# Patient Record
Sex: FEMALE | Race: WHITE | Employment: UNEMPLOYED | URBAN - METROPOLITAN AREA
[De-identification: names, ages, dates, MRNs, and addresses within clinical notes are randomized per-mention and may not be internally consistent; named-entity substitution may affect disease eponyms.]

---

## 2017-08-26 ENCOUNTER — HOSPITAL ENCOUNTER (EMERGENCY)
Age: 67
Discharge: HOME | End: 2017-08-26
Attending: EMERGENCY MEDICINE
Payer: MEDICARE

## 2017-08-26 ENCOUNTER — APPOINTMENT (OUTPATIENT)
Dept: GENERAL RADIOLOGY | Age: 67
End: 2017-08-26
Payer: MEDICARE

## 2017-08-26 VITALS
SYSTOLIC BLOOD PRESSURE: 155 MMHG | DIASTOLIC BLOOD PRESSURE: 82 MMHG | WEIGHT: 173 LBS | RESPIRATION RATE: 16 BRPM | TEMPERATURE: 97.8 F | OXYGEN SATURATION: 97 % | BODY MASS INDEX: 31.83 KG/M2 | HEART RATE: 77 BPM | HEIGHT: 62 IN

## 2017-08-26 DIAGNOSIS — M79.671 RIGHT FOOT PAIN: Primary | ICD-10-CM

## 2017-08-26 PROCEDURE — 73630 X-RAY EXAM OF FOOT: CPT

## 2017-08-26 PROCEDURE — 99283 EMERGENCY DEPT VISIT LOW MDM: CPT

## 2017-08-26 ASSESSMENT — PAIN DESCRIPTION - LOCATION: LOCATION: ANKLE

## 2017-08-26 ASSESSMENT — PAIN SCALES - GENERAL: PAINLEVEL_OUTOF10: 3

## 2017-08-26 ASSESSMENT — PAIN DESCRIPTION - PAIN TYPE: TYPE: ACUTE PAIN

## 2017-08-26 ASSESSMENT — PAIN DESCRIPTION - DESCRIPTORS: DESCRIPTORS: ACHING

## 2019-01-16 ENCOUNTER — TELEPHONE (OUTPATIENT)
Dept: FAMILY MEDICINE CLINIC | Age: 69
End: 2019-01-16

## 2019-01-16 ENCOUNTER — OFFICE VISIT (OUTPATIENT)
Dept: FAMILY MEDICINE CLINIC | Age: 69
End: 2019-01-16
Payer: MEDICARE

## 2019-01-16 VITALS
HEIGHT: 62 IN | HEART RATE: 82 BPM | OXYGEN SATURATION: 95 % | BODY MASS INDEX: 35.51 KG/M2 | DIASTOLIC BLOOD PRESSURE: 87 MMHG | SYSTOLIC BLOOD PRESSURE: 141 MMHG | WEIGHT: 193 LBS | TEMPERATURE: 97.4 F

## 2019-01-16 DIAGNOSIS — Z78.0 POST-MENOPAUSAL: ICD-10-CM

## 2019-01-16 DIAGNOSIS — R03.0 BORDERLINE BLOOD PRESSURE: ICD-10-CM

## 2019-01-16 DIAGNOSIS — H21.562 PUPILLARY ABNORMALITY, LEFT: ICD-10-CM

## 2019-01-16 DIAGNOSIS — E66.9 OBESITY, CLASS II, BMI 35-39.9, NO COMORBIDITY: ICD-10-CM

## 2019-01-16 DIAGNOSIS — E78.00 PURE HYPERCHOLESTEROLEMIA: Primary | ICD-10-CM

## 2019-01-16 DIAGNOSIS — Z23 NEED FOR PROPHYLACTIC VACCINATION AGAINST STREPTOCOCCUS PNEUMONIAE (PNEUMOCOCCUS): ICD-10-CM

## 2019-01-16 DIAGNOSIS — Z12.11 SCREENING FOR COLON CANCER: ICD-10-CM

## 2019-01-16 DIAGNOSIS — Z12.39 BREAST CANCER SCREENING: ICD-10-CM

## 2019-01-16 PROBLEM — E66.812 OBESITY, CLASS II, BMI 35-39.9, NO COMORBIDITY: Status: ACTIVE | Noted: 2019-01-16

## 2019-01-16 PROCEDURE — 99204 OFFICE O/P NEW MOD 45 MIN: CPT | Performed by: NURSE PRACTITIONER

## 2019-01-16 PROCEDURE — 1090F PRES/ABSN URINE INCON ASSESS: CPT | Performed by: NURSE PRACTITIONER

## 2019-01-16 PROCEDURE — G0009 ADMIN PNEUMOCOCCAL VACCINE: HCPCS | Performed by: NURSE PRACTITIONER

## 2019-01-16 PROCEDURE — 1100F PTFALLS ASSESS-DOCD GE2>/YR: CPT | Performed by: NURSE PRACTITIONER

## 2019-01-16 PROCEDURE — 1123F ACP DISCUSS/DSCN MKR DOCD: CPT | Performed by: NURSE PRACTITIONER

## 2019-01-16 PROCEDURE — 90670 PCV13 VACCINE IM: CPT | Performed by: NURSE PRACTITIONER

## 2019-01-16 PROCEDURE — G8427 DOCREV CUR MEDS BY ELIG CLIN: HCPCS | Performed by: NURSE PRACTITIONER

## 2019-01-16 PROCEDURE — 3017F COLORECTAL CA SCREEN DOC REV: CPT | Performed by: NURSE PRACTITIONER

## 2019-01-16 PROCEDURE — 0518F FALL PLAN OF CARE DOCD: CPT | Performed by: NURSE PRACTITIONER

## 2019-01-16 PROCEDURE — 4040F PNEUMOC VAC/ADMIN/RCVD: CPT | Performed by: NURSE PRACTITIONER

## 2019-01-16 PROCEDURE — 3288F FALL RISK ASSESSMENT DOCD: CPT | Performed by: NURSE PRACTITIONER

## 2019-01-16 PROCEDURE — G8417 CALC BMI ABV UP PARAM F/U: HCPCS | Performed by: NURSE PRACTITIONER

## 2019-01-16 PROCEDURE — G8400 PT W/DXA NO RESULTS DOC: HCPCS | Performed by: NURSE PRACTITIONER

## 2019-01-16 PROCEDURE — G8484 FLU IMMUNIZE NO ADMIN: HCPCS | Performed by: NURSE PRACTITIONER

## 2019-01-16 PROCEDURE — G8510 SCR DEP NEG, NO PLAN REQD: HCPCS | Performed by: NURSE PRACTITIONER

## 2019-01-16 PROCEDURE — 1036F TOBACCO NON-USER: CPT | Performed by: NURSE PRACTITIONER

## 2019-01-16 ASSESSMENT — PATIENT HEALTH QUESTIONNAIRE - PHQ9
SUM OF ALL RESPONSES TO PHQ QUESTIONS 1-9: 0
SUM OF ALL RESPONSES TO PHQ QUESTIONS 1-9: 0
2. FEELING DOWN, DEPRESSED OR HOPELESS: 0
1. LITTLE INTEREST OR PLEASURE IN DOING THINGS: 0
SUM OF ALL RESPONSES TO PHQ9 QUESTIONS 1 & 2: 0

## 2019-01-16 ASSESSMENT — ENCOUNTER SYMPTOMS
EYE DISCHARGE: 0
ABDOMINAL PAIN: 0
BLOOD IN STOOL: 0
SHORTNESS OF BREATH: 0
COUGH: 0

## 2019-01-22 ENCOUNTER — HOSPITAL ENCOUNTER (OUTPATIENT)
Dept: WOMENS IMAGING | Age: 69
Discharge: HOME | End: 2019-01-24
Payer: MEDICARE

## 2019-01-22 ENCOUNTER — HOSPITAL ENCOUNTER (OUTPATIENT)
Age: 69
Discharge: HOME | End: 2019-01-22
Payer: MEDICARE

## 2019-01-22 DIAGNOSIS — E78.00 PURE HYPERCHOLESTEROLEMIA: ICD-10-CM

## 2019-01-22 DIAGNOSIS — Z12.39 BREAST CANCER SCREENING: ICD-10-CM

## 2019-01-22 DIAGNOSIS — Z78.0 POST-MENOPAUSAL: ICD-10-CM

## 2019-01-22 DIAGNOSIS — E87.5 HYPERKALEMIA: Primary | ICD-10-CM

## 2019-01-22 LAB
ALBUMIN SERPL-MCNC: 3.9 G/DL (ref 3.5–5.2)
ALBUMIN/GLOBULIN RATIO: ABNORMAL (ref 1–2.5)
ALP BLD-CCNC: 72 U/L (ref 35–104)
ALT SERPL-CCNC: 13 U/L (ref 5–33)
ANION GAP SERPL CALCULATED.3IONS-SCNC: 10 MMOL/L (ref 9–17)
AST SERPL-CCNC: 19 U/L
BILIRUB SERPL-MCNC: 0.66 MG/DL (ref 0.3–1.2)
BUN BLDV-MCNC: 13 MG/DL (ref 8–23)
BUN/CREAT BLD: ABNORMAL (ref 9–20)
CALCIUM SERPL-MCNC: 9.7 MG/DL (ref 8.6–10.4)
CHLORIDE BLD-SCNC: 105 MMOL/L (ref 98–107)
CHOLESTEROL/HDL RATIO: 4.9
CHOLESTEROL: 270 MG/DL
CO2: 27 MMOL/L (ref 20–31)
CREAT SERPL-MCNC: 0.64 MG/DL (ref 0.5–0.9)
GFR AFRICAN AMERICAN: >60 ML/MIN
GFR NON-AFRICAN AMERICAN: >60 ML/MIN
GFR SERPL CREATININE-BSD FRML MDRD: ABNORMAL ML/MIN/{1.73_M2}
GFR SERPL CREATININE-BSD FRML MDRD: ABNORMAL ML/MIN/{1.73_M2}
GLUCOSE BLD-MCNC: 83 MG/DL (ref 70–99)
HCT VFR BLD CALC: 44.6 % (ref 36–46)
HDLC SERPL-MCNC: 55 MG/DL
HEMOGLOBIN: 14.9 G/DL (ref 12–16)
LDL CHOLESTEROL: 196 MG/DL (ref 0–130)
MCH RBC QN AUTO: 30.5 PG (ref 26–34)
MCHC RBC AUTO-ENTMCNC: 33.4 G/DL (ref 31–37)
MCV RBC AUTO: 91.3 FL (ref 80–100)
NRBC AUTOMATED: NORMAL PER 100 WBC
PDW BLD-RTO: 13.5 % (ref 11.5–14.9)
PLATELET # BLD: 314 K/UL (ref 150–450)
PMV BLD AUTO: 8.6 FL (ref 6–12)
POTASSIUM SERPL-SCNC: 5.5 MMOL/L (ref 3.7–5.3)
RBC # BLD: 4.89 M/UL (ref 4–5.2)
SODIUM BLD-SCNC: 142 MMOL/L (ref 135–144)
TOTAL PROTEIN: 7.5 G/DL (ref 6.4–8.3)
TRIGL SERPL-MCNC: 97 MG/DL
VLDLC SERPL CALC-MCNC: ABNORMAL MG/DL (ref 1–30)
WBC # BLD: 3.9 K/UL (ref 3.5–11)

## 2019-01-22 PROCEDURE — 77063 BREAST TOMOSYNTHESIS BI: CPT

## 2019-01-22 PROCEDURE — 77080 DXA BONE DENSITY AXIAL: CPT

## 2019-01-22 PROCEDURE — 80053 COMPREHEN METABOLIC PANEL: CPT

## 2019-01-22 PROCEDURE — 85027 COMPLETE CBC AUTOMATED: CPT

## 2019-01-22 PROCEDURE — 36415 COLL VENOUS BLD VENIPUNCTURE: CPT

## 2019-01-22 PROCEDURE — 80061 LIPID PANEL: CPT

## 2019-01-23 ENCOUNTER — TELEPHONE (OUTPATIENT)
Dept: FAMILY MEDICINE CLINIC | Age: 69
End: 2019-01-23

## 2019-01-23 DIAGNOSIS — M81.0 OSTEOPOROSIS WITHOUT CURRENT PATHOLOGICAL FRACTURE, UNSPECIFIED OSTEOPOROSIS TYPE: ICD-10-CM

## 2019-01-23 RX ORDER — ALENDRONATE SODIUM 70 MG/1
70 TABLET ORAL
Qty: 12 TABLET | Refills: 3 | Status: SHIPPED | OUTPATIENT
Start: 2019-01-23 | End: 2019-09-03

## 2019-01-23 RX ORDER — SODIUM POLYSTYRENE SULFONATE 15 G/60ML
15 SUSPENSION ORAL; RECTAL ONCE
Qty: 1 BOTTLE | Refills: 3 | Status: SHIPPED | OUTPATIENT
Start: 2019-01-23 | End: 2019-01-30 | Stop reason: ALTCHOICE

## 2019-01-23 RX ORDER — ATORVASTATIN CALCIUM 40 MG/1
40 TABLET, FILM COATED ORAL DAILY
Qty: 90 TABLET | Refills: 1 | Status: SHIPPED | OUTPATIENT
Start: 2019-01-23 | End: 2019-09-03

## 2019-01-25 ENCOUNTER — HOSPITAL ENCOUNTER (OUTPATIENT)
Age: 69
Discharge: HOME | End: 2019-01-25
Payer: MEDICARE

## 2019-01-25 DIAGNOSIS — E87.5 HYPERKALEMIA: ICD-10-CM

## 2019-01-25 LAB — POTASSIUM SERPL-SCNC: 4.9 MMOL/L (ref 3.7–5.3)

## 2019-01-25 PROCEDURE — 84132 ASSAY OF SERUM POTASSIUM: CPT

## 2019-01-25 PROCEDURE — 36415 COLL VENOUS BLD VENIPUNCTURE: CPT

## 2019-01-28 ENCOUNTER — TELEPHONE (OUTPATIENT)
Dept: FAMILY MEDICINE CLINIC | Age: 69
End: 2019-01-28

## 2019-01-28 DIAGNOSIS — E87.5 HYPERKALEMIA: ICD-10-CM

## 2019-01-30 ENCOUNTER — OFFICE VISIT (OUTPATIENT)
Dept: FAMILY MEDICINE CLINIC | Age: 69
End: 2019-01-30
Payer: MEDICARE

## 2019-01-30 VITALS
HEART RATE: 79 BPM | WEIGHT: 193.2 LBS | DIASTOLIC BLOOD PRESSURE: 78 MMHG | OXYGEN SATURATION: 92 % | SYSTOLIC BLOOD PRESSURE: 138 MMHG | BODY MASS INDEX: 35.55 KG/M2 | HEIGHT: 62 IN

## 2019-01-30 DIAGNOSIS — M81.0 OSTEOPOROSIS WITHOUT CURRENT PATHOLOGICAL FRACTURE, UNSPECIFIED OSTEOPOROSIS TYPE: ICD-10-CM

## 2019-01-30 DIAGNOSIS — E66.9 OBESITY, CLASS II, BMI 35-39.9, NO COMORBIDITY: ICD-10-CM

## 2019-01-30 DIAGNOSIS — Z23 NEED FOR DIPHTHERIA-TETANUS-PERTUSSIS (TDAP) VACCINE: ICD-10-CM

## 2019-01-30 DIAGNOSIS — E87.5 HYPERKALEMIA: Primary | ICD-10-CM

## 2019-01-30 DIAGNOSIS — E78.00 PURE HYPERCHOLESTEROLEMIA: ICD-10-CM

## 2019-01-30 PROCEDURE — G8482 FLU IMMUNIZE ORDER/ADMIN: HCPCS | Performed by: NURSE PRACTITIONER

## 2019-01-30 PROCEDURE — 1036F TOBACCO NON-USER: CPT | Performed by: NURSE PRACTITIONER

## 2019-01-30 PROCEDURE — G8417 CALC BMI ABV UP PARAM F/U: HCPCS | Performed by: NURSE PRACTITIONER

## 2019-01-30 PROCEDURE — G8399 PT W/DXA RESULTS DOCUMENT: HCPCS | Performed by: NURSE PRACTITIONER

## 2019-01-30 PROCEDURE — 4040F PNEUMOC VAC/ADMIN/RCVD: CPT | Performed by: NURSE PRACTITIONER

## 2019-01-30 PROCEDURE — G8427 DOCREV CUR MEDS BY ELIG CLIN: HCPCS | Performed by: NURSE PRACTITIONER

## 2019-01-30 PROCEDURE — 1123F ACP DISCUSS/DSCN MKR DOCD: CPT | Performed by: NURSE PRACTITIONER

## 2019-01-30 PROCEDURE — 1101F PT FALLS ASSESS-DOCD LE1/YR: CPT | Performed by: NURSE PRACTITIONER

## 2019-01-30 PROCEDURE — 99214 OFFICE O/P EST MOD 30 MIN: CPT | Performed by: NURSE PRACTITIONER

## 2019-01-30 PROCEDURE — 3017F COLORECTAL CA SCREEN DOC REV: CPT | Performed by: NURSE PRACTITIONER

## 2019-01-30 PROCEDURE — 1090F PRES/ABSN URINE INCON ASSESS: CPT | Performed by: NURSE PRACTITIONER

## 2019-01-30 ASSESSMENT — ENCOUNTER SYMPTOMS
SHORTNESS OF BREATH: 0
BLOOD IN STOOL: 0
ABDOMINAL PAIN: 0
COUGH: 0
EYE DISCHARGE: 0

## 2019-09-03 ENCOUNTER — OFFICE VISIT (OUTPATIENT)
Dept: FAMILY MEDICINE CLINIC | Age: 69
End: 2019-09-03
Payer: MEDICARE

## 2019-09-03 VITALS
WEIGHT: 187.2 LBS | SYSTOLIC BLOOD PRESSURE: 110 MMHG | DIASTOLIC BLOOD PRESSURE: 64 MMHG | OXYGEN SATURATION: 93 % | HEART RATE: 121 BPM | BODY MASS INDEX: 34.45 KG/M2 | TEMPERATURE: 97.3 F | HEIGHT: 62 IN

## 2019-09-03 DIAGNOSIS — M81.0 AGE-RELATED OSTEOPOROSIS WITHOUT CURRENT PATHOLOGICAL FRACTURE: ICD-10-CM
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
## 2019-09-03 DIAGNOSIS — J40 BRONCHITIS: Primary | ICD-10-CM

## 2019-09-03 DIAGNOSIS — E78.2 MIXED HYPERLIPIDEMIA: ICD-10-CM

## 2019-09-03 DIAGNOSIS — Z91.81 AT HIGH RISK FOR FALLS: ICD-10-CM

## 2019-09-03 DIAGNOSIS — Z00.00 PREVENTATIVE HEALTH CARE: ICD-10-CM

## 2019-09-03 PROBLEM — E87.5 HYPERKALEMIA: Status: RESOLVED | Noted: 2019-01-28 | Resolved: 2019-09-03

## 2019-09-03 PROCEDURE — 1036F TOBACCO NON-USER: CPT | Performed by: FAMILY MEDICINE

## 2019-09-03 PROCEDURE — 99214 OFFICE O/P EST MOD 30 MIN: CPT | Performed by: FAMILY MEDICINE

## 2019-09-03 PROCEDURE — G8399 PT W/DXA RESULTS DOCUMENT: HCPCS | Performed by: FAMILY MEDICINE

## 2019-09-03 PROCEDURE — 1123F ACP DISCUSS/DSCN MKR DOCD: CPT | Performed by: FAMILY MEDICINE

## 2019-09-03 PROCEDURE — 4040F PNEUMOC VAC/ADMIN/RCVD: CPT | Performed by: FAMILY MEDICINE

## 2019-09-03 PROCEDURE — G8427 DOCREV CUR MEDS BY ELIG CLIN: HCPCS | Performed by: FAMILY MEDICINE

## 2019-09-03 PROCEDURE — 3017F COLORECTAL CA SCREEN DOC REV: CPT | Performed by: FAMILY MEDICINE

## 2019-09-03 PROCEDURE — G8417 CALC BMI ABV UP PARAM F/U: HCPCS | Performed by: FAMILY MEDICINE

## 2019-09-03 PROCEDURE — 1090F PRES/ABSN URINE INCON ASSESS: CPT | Performed by: FAMILY MEDICINE

## 2019-09-03 RX ORDER — AZITHROMYCIN 250 MG/1
TABLET, FILM COATED ORAL
Qty: 6 TABLET | Refills: 0 | Status: SHIPPED | OUTPATIENT
Start: 2019-09-03 | End: 2019-09-08

## 2019-09-03 RX ORDER — ASPIRIN 81 MG/1
81 TABLET ORAL DAILY
Qty: 90 TABLET | Refills: 1 | Status: SHIPPED | OUTPATIENT
Start: 2019-09-03 | End: 2020-03-13

## 2019-09-03 RX ORDER — ATORVASTATIN CALCIUM 20 MG/1
20 TABLET, FILM COATED ORAL DAILY
Qty: 30 TABLET | Refills: 3 | Status: SHIPPED | OUTPATIENT
Start: 2019-09-03 | End: 2020-01-24 | Stop reason: SDUPTHER

## 2019-09-03 RX ORDER — GUAIFENESIN 600 MG/1
600 TABLET, EXTENDED RELEASE ORAL 2 TIMES DAILY
Qty: 30 TABLET | Refills: 0 | Status: SHIPPED | OUTPATIENT
Start: 2019-09-03 | End: 2019-12-03 | Stop reason: ALTCHOICE

## 2019-09-03 ASSESSMENT — ENCOUNTER SYMPTOMS
RHINORRHEA: 1
COUGH: 1
SINUS PAIN: 1
VOMITING: 0
BACK PAIN: 0
WHEEZING: 0
PHOTOPHOBIA: 0
SINUS PRESSURE: 1
CONSTIPATION: 0
SHORTNESS OF BREATH: 1
ABDOMINAL PAIN: 0
ABDOMINAL DISTENTION: 0
COLOR CHANGE: 0
CHEST TIGHTNESS: 1
SORE THROAT: 0
NAUSEA: 0

## 2019-09-03 NOTE — PROGRESS NOTES
Visit Information    Have you changed or started any medications since your last visit including any over-the-counter medicines, vitamins, or herbal medicines? no   Are you having any side effects from any of your medications? -  no  Have you stopped taking any of your medications? Is so, why? -  no    Have you seen any other physician or provider since your last visit? No  Have you had any other diagnostic tests since your last visit? No  Have you been seen in the emergency room and/or had an admission to a hospital since we last saw you? No  Have you had your routine dental cleaning in the past 6 months? no    Have you activated your [employer] account? If not, what are your barriers?  Yes     Patient Care Team:  Alyson Laguna MD as PCP - General (Family Medicine)  Alyson Laguna MD as PCP - [facility]    Medical History Review  Past Medical, Family, and Social History reviewed and does contribute to the patient presenting condition    Health Maintenance   Topic Date Due    Hepatitis C screen  1950    Shingles Vaccine (1 of 2) 10/19/2000    Colon cancer screen colonoscopy  10/19/2000    Annual Wellness Visit (AWV)  10/19/2013    Flu vaccine (1) 09/01/2019    Pneumococcal 65+ years Vaccine (2 of 2 - PPSV23) 01/16/2020    Breast cancer screen  01/22/2021    Lipid screen  01/22/2024    DTaP/Tdap/Td vaccine (2 - Td) 01/30/2029    DEXA (modify frequency per FRAX score)  Completed

## 2019-09-10 DIAGNOSIS — Z12.11 SCREENING FOR COLON CANCER: ICD-10-CM

## 2019-09-10 LAB
CONTROL: PRESENT
HEMOCCULT STL QL: NEGATIVE

## 2019-09-10 PROCEDURE — 82274 ASSAY TEST FOR BLOOD FECAL: CPT | Performed by: FAMILY MEDICINE

## 2019-09-24 ENCOUNTER — OFFICE VISIT (OUTPATIENT)
Dept: FAMILY MEDICINE CLINIC | Age: 69
End: 2019-09-24
Payer: MEDICARE

## 2019-09-24 VITALS
SYSTOLIC BLOOD PRESSURE: 133 MMHG | DIASTOLIC BLOOD PRESSURE: 79 MMHG | OXYGEN SATURATION: 98 % | WEIGHT: 188 LBS | BODY MASS INDEX: 34.6 KG/M2 | TEMPERATURE: 96.9 F | HEIGHT: 62 IN | HEART RATE: 74 BPM

## 2019-09-24 DIAGNOSIS — Z00.00 ROUTINE GENERAL MEDICAL EXAMINATION AT A HEALTH CARE FACILITY: ICD-10-CM

## 2019-09-24 DIAGNOSIS — Z00.00 MEDICARE ANNUAL WELLNESS VISIT, INITIAL: Primary | ICD-10-CM

## 2019-09-24 DIAGNOSIS — Z23 NEED FOR VACCINATION: ICD-10-CM

## 2019-09-24 DIAGNOSIS — E78.2 MIXED HYPERLIPIDEMIA: ICD-10-CM

## 2019-09-24 PROCEDURE — G0438 PPPS, INITIAL VISIT: HCPCS | Performed by: FAMILY MEDICINE

## 2019-09-24 PROCEDURE — 4040F PNEUMOC VAC/ADMIN/RCVD: CPT | Performed by: FAMILY MEDICINE

## 2019-09-24 PROCEDURE — 1123F ACP DISCUSS/DSCN MKR DOCD: CPT | Performed by: FAMILY MEDICINE

## 2019-09-24 PROCEDURE — 3017F COLORECTAL CA SCREEN DOC REV: CPT | Performed by: FAMILY MEDICINE

## 2019-09-24 ASSESSMENT — LIFESTYLE VARIABLES: HOW OFTEN DO YOU HAVE A DRINK CONTAINING ALCOHOL: 0

## 2019-09-24 ASSESSMENT — PATIENT HEALTH QUESTIONNAIRE - PHQ9
SUM OF ALL RESPONSES TO PHQ QUESTIONS 1-9: 0
SUM OF ALL RESPONSES TO PHQ QUESTIONS 1-9: 0

## 2019-12-03 ENCOUNTER — OFFICE VISIT (OUTPATIENT)
Dept: FAMILY MEDICINE CLINIC | Age: 69
End: 2019-12-03
Payer: MEDICARE

## 2019-12-03 VITALS
HEART RATE: 72 BPM | HEIGHT: 62 IN | BODY MASS INDEX: 35.96 KG/M2 | OXYGEN SATURATION: 97 % | WEIGHT: 195.4 LBS | SYSTOLIC BLOOD PRESSURE: 135 MMHG | DIASTOLIC BLOOD PRESSURE: 83 MMHG

## 2019-12-03 DIAGNOSIS — Z23 NEED FOR VACCINATION: ICD-10-CM

## 2019-12-03 DIAGNOSIS — E78.2 MIXED HYPERLIPIDEMIA: Primary | ICD-10-CM

## 2019-12-03 DIAGNOSIS — M81.0 AGE-RELATED OSTEOPOROSIS WITHOUT CURRENT PATHOLOGICAL FRACTURE: ICD-10-CM

## 2019-12-03 DIAGNOSIS — E66.9 OBESITY, CLASS II, BMI 35-39.9, NO COMORBIDITY: ICD-10-CM

## 2019-12-03 DIAGNOSIS — Z23 NEED FOR PROPHYLACTIC VACCINATION AND INOCULATION AGAINST VARICELLA: ICD-10-CM

## 2019-12-03 PROCEDURE — 1036F TOBACCO NON-USER: CPT | Performed by: FAMILY MEDICINE

## 2019-12-03 PROCEDURE — G8427 DOCREV CUR MEDS BY ELIG CLIN: HCPCS | Performed by: FAMILY MEDICINE

## 2019-12-03 PROCEDURE — G0008 ADMIN INFLUENZA VIRUS VAC: HCPCS | Performed by: FAMILY MEDICINE

## 2019-12-03 PROCEDURE — 1090F PRES/ABSN URINE INCON ASSESS: CPT | Performed by: FAMILY MEDICINE

## 2019-12-03 PROCEDURE — G8482 FLU IMMUNIZE ORDER/ADMIN: HCPCS | Performed by: FAMILY MEDICINE

## 2019-12-03 PROCEDURE — 99213 OFFICE O/P EST LOW 20 MIN: CPT | Performed by: FAMILY MEDICINE

## 2019-12-03 PROCEDURE — 90653 IIV ADJUVANT VACCINE IM: CPT | Performed by: FAMILY MEDICINE

## 2019-12-03 PROCEDURE — 1123F ACP DISCUSS/DSCN MKR DOCD: CPT | Performed by: FAMILY MEDICINE

## 2019-12-03 PROCEDURE — G8399 PT W/DXA RESULTS DOCUMENT: HCPCS | Performed by: FAMILY MEDICINE

## 2019-12-03 PROCEDURE — 4040F PNEUMOC VAC/ADMIN/RCVD: CPT | Performed by: FAMILY MEDICINE

## 2019-12-03 PROCEDURE — 3017F COLORECTAL CA SCREEN DOC REV: CPT | Performed by: FAMILY MEDICINE

## 2019-12-03 PROCEDURE — G8417 CALC BMI ABV UP PARAM F/U: HCPCS | Performed by: FAMILY MEDICINE

## 2019-12-03 ASSESSMENT — PATIENT HEALTH QUESTIONNAIRE - PHQ9
2. FEELING DOWN, DEPRESSED OR HOPELESS: 0
SUM OF ALL RESPONSES TO PHQ9 QUESTIONS 1 & 2: 0
SUM OF ALL RESPONSES TO PHQ QUESTIONS 1-9: 0
1. LITTLE INTEREST OR PLEASURE IN DOING THINGS: 0
SUM OF ALL RESPONSES TO PHQ QUESTIONS 1-9: 0

## 2019-12-03 ASSESSMENT — ENCOUNTER SYMPTOMS
CONSTIPATION: 0
ABDOMINAL PAIN: 0
COUGH: 0
ABDOMINAL DISTENTION: 0
COLOR CHANGE: 0
DIARRHEA: 0
SINUS PRESSURE: 0
PHOTOPHOBIA: 0
SHORTNESS OF BREATH: 0
WHEEZING: 0

## 2020-01-24 RX ORDER — ATORVASTATIN CALCIUM 20 MG/1
20 TABLET, FILM COATED ORAL DAILY
Qty: 30 TABLET | Refills: 3 | Status: SHIPPED | OUTPATIENT
Start: 2020-01-24 | End: 2020-05-12

## 2020-02-08 ENCOUNTER — APPOINTMENT (OUTPATIENT)
Dept: GENERAL RADIOLOGY | Age: 70
End: 2020-02-08
Payer: MEDICARE

## 2020-02-08 ENCOUNTER — HOSPITAL ENCOUNTER (EMERGENCY)
Age: 70
Discharge: HOME | End: 2020-02-08
Attending: EMERGENCY MEDICINE
Payer: MEDICARE

## 2020-02-08 VITALS
SYSTOLIC BLOOD PRESSURE: 186 MMHG | HEIGHT: 62 IN | RESPIRATION RATE: 18 BRPM | OXYGEN SATURATION: 98 % | DIASTOLIC BLOOD PRESSURE: 86 MMHG | TEMPERATURE: 97.7 F | HEART RATE: 124 BPM | BODY MASS INDEX: 34.96 KG/M2 | WEIGHT: 190 LBS

## 2020-02-08 PROCEDURE — 2500000003 HC RX 250 WO HCPCS: Performed by: STUDENT IN AN ORGANIZED HEALTH CARE EDUCATION/TRAINING PROGRAM

## 2020-02-08 PROCEDURE — 99283 EMERGENCY DEPT VISIT LOW MDM: CPT

## 2020-02-08 PROCEDURE — 6370000000 HC RX 637 (ALT 250 FOR IP): Performed by: STUDENT IN AN ORGANIZED HEALTH CARE EDUCATION/TRAINING PROGRAM

## 2020-02-08 PROCEDURE — 6360000002 HC RX W HCPCS: Performed by: STUDENT IN AN ORGANIZED HEALTH CARE EDUCATION/TRAINING PROGRAM

## 2020-02-08 PROCEDURE — 90471 IMMUNIZATION ADMIN: CPT | Performed by: STUDENT IN AN ORGANIZED HEALTH CARE EDUCATION/TRAINING PROGRAM

## 2020-02-08 PROCEDURE — 90715 TDAP VACCINE 7 YRS/> IM: CPT | Performed by: STUDENT IN AN ORGANIZED HEALTH CARE EDUCATION/TRAINING PROGRAM

## 2020-02-08 PROCEDURE — 73130 X-RAY EXAM OF HAND: CPT

## 2020-02-08 RX ORDER — IBUPROFEN 800 MG/1
800 TABLET ORAL ONCE
Status: COMPLETED | OUTPATIENT
Start: 2020-02-08 | End: 2020-02-08

## 2020-02-08 RX ORDER — CEPHALEXIN 500 MG/1
500 CAPSULE ORAL 2 TIMES DAILY
Qty: 14 CAPSULE | Refills: 0 | Status: SHIPPED | OUTPATIENT
Start: 2020-02-08 | End: 2020-02-15

## 2020-02-08 RX ORDER — CEPHALEXIN 250 MG/1
500 CAPSULE ORAL ONCE
Status: COMPLETED | OUTPATIENT
Start: 2020-02-08 | End: 2020-02-08

## 2020-02-08 RX ORDER — OXYCODONE HYDROCHLORIDE 5 MG/1
5 TABLET ORAL EVERY 6 HOURS PRN
Qty: 8 TABLET | Refills: 0 | Status: SHIPPED | OUTPATIENT
Start: 2020-02-08 | End: 2020-02-11

## 2020-02-08 RX ORDER — BUPIVACAINE HYDROCHLORIDE 5 MG/ML
30 INJECTION, SOLUTION EPIDURAL; INTRACAUDAL ONCE
Status: COMPLETED | OUTPATIENT
Start: 2020-02-08 | End: 2020-02-08

## 2020-02-08 RX ORDER — ACETAMINOPHEN 500 MG
1000 TABLET ORAL EVERY 6 HOURS PRN
Qty: 30 TABLET | Refills: 0 | Status: SHIPPED | OUTPATIENT
Start: 2020-02-08 | End: 2022-02-15 | Stop reason: ALTCHOICE

## 2020-02-08 RX ORDER — IBUPROFEN 800 MG/1
800 TABLET ORAL ONCE
Status: DISCONTINUED | OUTPATIENT
Start: 2020-02-08 | End: 2020-02-08

## 2020-02-08 RX ORDER — IBUPROFEN 600 MG/1
600 TABLET ORAL EVERY 6 HOURS PRN
Qty: 30 TABLET | Refills: 0 | Status: SHIPPED | OUTPATIENT
Start: 2020-02-08 | End: 2022-02-15 | Stop reason: SDUPTHER

## 2020-02-08 RX ADMIN — IBUPROFEN 800 MG: 800 TABLET, FILM COATED ORAL at 17:01

## 2020-02-08 RX ADMIN — CEPHALEXIN 500 MG: 250 CAPSULE ORAL at 16:47

## 2020-02-08 RX ADMIN — BUPIVACAINE HYDROCHLORIDE 150 MG: 5 INJECTION, SOLUTION EPIDURAL; INTRACAUDAL; PERINEURAL at 17:05

## 2020-02-08 RX ADMIN — TETANUS TOXOID, REDUCED DIPHTHERIA TOXOID AND ACELLULAR PERTUSSIS VACCINE, ADSORBED 0.5 ML: 5; 2.5; 8; 8; 2.5 SUSPENSION INTRAMUSCULAR at 16:47

## 2020-02-08 ASSESSMENT — ENCOUNTER SYMPTOMS
EYE REDNESS: 0
EYE DISCHARGE: 0
ABDOMINAL PAIN: 0
COLOR CHANGE: 0
SHORTNESS OF BREATH: 0

## 2020-02-08 ASSESSMENT — PAIN SCALES - GENERAL
PAINLEVEL_OUTOF10: 5
PAINLEVEL_OUTOF10: 5
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
## 2020-02-10 ENCOUNTER — TELEPHONE (OUTPATIENT)
Dept: FAMILY MEDICINE CLINIC | Age: 70
End: 2020-02-10

## 2020-02-10 NOTE — TELEPHONE ENCOUNTER
[facility] ED Follow up Call    Reason for ED visit: Hand/finger amputation         FU appts/Provider:    Future Appointments   Date Time Provider Aleida Desir   6/3/2020  7:15 AM Meghan Sainz MD [facility] [address] IF NOT USED  Hi, this message is for Yarelis Hurst This is [place] from [place] office. Just calling to see how you are doing after your recent visit to the Emergency Room. [facility] wants to make sure you were able to fill any prescriptions and that you understand your discharge instructions. Please return our call if you need to make a follow up appointment with your provider or have any further needs. Our phone number is [phone]. Have a great day.

## 2020-03-13 RX ORDER — ACETAMINOPHEN/DIPHENHYDRAMINE 500MG-25MG
TABLET ORAL
Qty: 90 TABLET | Refills: 1 | Status: SHIPPED | OUTPATIENT
Start: 2020-03-13 | End: 2020-11-27 | Stop reason: SDUPTHER

## 2020-05-12 RX ORDER — ATORVASTATIN CALCIUM 20 MG/1
TABLET, FILM COATED ORAL
Qty: 30 TABLET | Refills: 3 | Status: SHIPPED | OUTPATIENT
Start: 2020-05-12 | End: 2020-06-03

## 2020-05-12 NOTE — TELEPHONE ENCOUNTER
Please Approve or Refuse.   Send to Pharmacy per Pt's Request:     RX: [facility]     Next Visit Date:  7/1/2020   Last Visit Date: 12/3/2019    No results found for: LABA1C          ( goal A1C is < 7)   BP Readings from Last 3 Encounters:   02/08/20 (!) 186/86   12/03/19 135/83   09/24/19 133/79          (goal 120/80)  BUN   Date Value Ref Range Status   01/22/2019 13 8 - 23 mg/dL Final     CREATININE   Date Value Ref Range Status   01/22/2019 0.64 0.50 - 0.90 mg/dL Final     Potassium   Date Value Ref Range Status   01/25/2019 4.9 3.7 - 5.3 mmol/L Final

## 2020-06-03 RX ORDER — ATORVASTATIN CALCIUM 20 MG/1
TABLET, FILM COATED ORAL
Qty: 30 TABLET | Refills: 3 | Status: SHIPPED | OUTPATIENT
Start: 2020-06-03 | End: 2020-11-27 | Stop reason: SDUPTHER

## 2020-07-20 ENCOUNTER — HOSPITAL ENCOUNTER (OUTPATIENT)
Age: 70
Setting detail: SPECIMEN
Discharge: HOME | End: 2020-07-20
Payer: MEDICARE

## 2020-07-20 ENCOUNTER — OFFICE VISIT (OUTPATIENT)
Dept: FAMILY MEDICINE CLINIC | Age: 70
End: 2020-07-20
Payer: MEDICARE

## 2020-07-20 VITALS
OXYGEN SATURATION: 98 % | HEIGHT: 62 IN | HEART RATE: 74 BPM | SYSTOLIC BLOOD PRESSURE: 128 MMHG | BODY MASS INDEX: 37.32 KG/M2 | TEMPERATURE: 97.7 F | DIASTOLIC BLOOD PRESSURE: 84 MMHG | WEIGHT: 202.8 LBS

## 2020-07-20 LAB
ALBUMIN SERPL-MCNC: 4.1 G/DL (ref 3.5–5.2)
ALBUMIN/GLOBULIN RATIO: NORMAL (ref 1–2.5)
ALP BLD-CCNC: 74 U/L (ref 35–104)
ALT SERPL-CCNC: 15 U/L (ref 5–33)
ANION GAP SERPL CALCULATED.3IONS-SCNC: 11 MMOL/L (ref 9–17)
AST SERPL-CCNC: 18 U/L
BILIRUB SERPL-MCNC: 0.34 MG/DL (ref 0.3–1.2)
BUN BLDV-MCNC: 22 MG/DL (ref 8–23)
BUN/CREAT BLD: NORMAL (ref 9–20)
CALCIUM SERPL-MCNC: 9.6 MG/DL (ref 8.6–10.4)
CHLORIDE BLD-SCNC: 104 MMOL/L (ref 98–107)
CHOLESTEROL/HDL RATIO: 3
CHOLESTEROL: 194 MG/DL
CO2: 26 MMOL/L (ref 20–31)
CREAT SERPL-MCNC: 0.67 MG/DL (ref 0.5–0.9)
GFR AFRICAN AMERICAN: >60 ML/MIN
GFR NON-AFRICAN AMERICAN: >60 ML/MIN
GFR SERPL CREATININE-BSD FRML MDRD: NORMAL ML/MIN/{1.73_M2}
GFR SERPL CREATININE-BSD FRML MDRD: NORMAL ML/MIN/{1.73_M2}
GLUCOSE BLD-MCNC: 99 MG/DL (ref 70–99)
HCT VFR BLD CALC: 45.7 % (ref 36–46)
HDLC SERPL-MCNC: 64 MG/DL
HEMOGLOBIN: 15.2 G/DL (ref 12–16)
HEPATITIS C ANTIBODY: NONREACTIVE
LDL CHOLESTEROL: 115 MG/DL (ref 0–130)
MCH RBC QN AUTO: 30.9 PG (ref 26–34)
MCHC RBC AUTO-ENTMCNC: 33.3 G/DL (ref 31–37)
MCV RBC AUTO: 92.8 FL (ref 80–100)
NRBC AUTOMATED: NORMAL PER 100 WBC
PDW BLD-RTO: 13.1 % (ref 11.5–14.9)
PLATELET # BLD: 279 K/UL (ref 150–450)
PMV BLD AUTO: 8.6 FL (ref 6–12)
POTASSIUM SERPL-SCNC: 4.6 MMOL/L (ref 3.7–5.3)
RBC # BLD: 4.93 M/UL (ref 4–5.2)
SODIUM BLD-SCNC: 141 MMOL/L (ref 135–144)
TOTAL PROTEIN: 7.7 G/DL (ref 6.4–8.3)
TRIGL SERPL-MCNC: 75 MG/DL
TSH SERPL DL<=0.05 MIU/L-ACNC: 2.42 MIU/L (ref 0.3–5)
VLDLC SERPL CALC-MCNC: NORMAL MG/DL (ref 1–30)
WBC # BLD: 4.6 K/UL (ref 3.5–11)

## 2020-07-20 PROCEDURE — 85027 COMPLETE CBC AUTOMATED: CPT

## 2020-07-20 PROCEDURE — 84443 ASSAY THYROID STIM HORMONE: CPT

## 2020-07-20 PROCEDURE — 86803 HEPATITIS C AB TEST: CPT

## 2020-07-20 PROCEDURE — 36415 COLL VENOUS BLD VENIPUNCTURE: CPT

## 2020-07-20 PROCEDURE — 1090F PRES/ABSN URINE INCON ASSESS: CPT | Performed by: FAMILY MEDICINE

## 2020-07-20 PROCEDURE — 1123F ACP DISCUSS/DSCN MKR DOCD: CPT | Performed by: FAMILY MEDICINE

## 2020-07-20 PROCEDURE — G8427 DOCREV CUR MEDS BY ELIG CLIN: HCPCS | Performed by: FAMILY MEDICINE

## 2020-07-20 PROCEDURE — 99213 OFFICE O/P EST LOW 20 MIN: CPT | Performed by: FAMILY MEDICINE

## 2020-07-20 PROCEDURE — 3017F COLORECTAL CA SCREEN DOC REV: CPT | Performed by: FAMILY MEDICINE

## 2020-07-20 PROCEDURE — 80061 LIPID PANEL: CPT

## 2020-07-20 PROCEDURE — G8417 CALC BMI ABV UP PARAM F/U: HCPCS | Performed by: FAMILY MEDICINE

## 2020-07-20 PROCEDURE — 80053 COMPREHEN METABOLIC PANEL: CPT

## 2020-07-20 PROCEDURE — G8399 PT W/DXA RESULTS DOCUMENT: HCPCS | Performed by: FAMILY MEDICINE

## 2020-07-20 PROCEDURE — 4040F PNEUMOC VAC/ADMIN/RCVD: CPT | Performed by: FAMILY MEDICINE

## 2020-07-20 PROCEDURE — 1036F TOBACCO NON-USER: CPT | Performed by: FAMILY MEDICINE

## 2020-07-20 RX ORDER — SODIUM CHLORIDE 9 MG/ML
INJECTION, SOLUTION INTRAVENOUS CONTINUOUS
Status: CANCELLED | OUTPATIENT
Start: 2020-07-20

## 2020-07-20 RX ORDER — DIPHENHYDRAMINE HYDROCHLORIDE 50 MG/ML
50 INJECTION INTRAMUSCULAR; INTRAVENOUS ONCE
Status: CANCELLED | OUTPATIENT
Start: 2020-07-20

## 2020-07-20 RX ORDER — EPINEPHRINE 1 MG/ML
0.3 INJECTION, SOLUTION, CONCENTRATE INTRAVENOUS PRN
Status: CANCELLED | OUTPATIENT
Start: 2020-07-20

## 2020-07-20 RX ORDER — METHYLPREDNISOLONE SODIUM SUCCINATE 125 MG/2ML
125 INJECTION, POWDER, LYOPHILIZED, FOR SOLUTION INTRAMUSCULAR; INTRAVENOUS ONCE
Status: CANCELLED | OUTPATIENT
Start: 2020-07-20

## 2020-07-20 ASSESSMENT — ENCOUNTER SYMPTOMS
COUGH: 0
PHOTOPHOBIA: 0
WHEEZING: 0
DIARRHEA: 0
BLOOD IN STOOL: 0
VOMITING: 0
ABDOMINAL DISTENTION: 0
CONSTIPATION: 0
BACK PAIN: 0
COLOR CHANGE: 0
SHORTNESS OF BREATH: 0
NAUSEA: 0
ABDOMINAL PAIN: 0

## 2020-07-20 ASSESSMENT — PATIENT HEALTH QUESTIONNAIRE - PHQ9
1. LITTLE INTEREST OR PLEASURE IN DOING THINGS: 0
2. FEELING DOWN, DEPRESSED OR HOPELESS: 0
SUM OF ALL RESPONSES TO PHQ9 QUESTIONS 1 & 2: 0
SUM OF ALL RESPONSES TO PHQ QUESTIONS 1-9: 0
SUM OF ALL RESPONSES TO PHQ QUESTIONS 1-9: 0

## 2020-07-20 NOTE — PROGRESS NOTES
Visit Information    Have you changed or started any medications since your last visit including any over-the-counter medicines, vitamins, or herbal medicines? no   Are you having any side effects from any of your medications? -  no  Have you stopped taking any of your medications? Is so, why? -  no    Have you seen any other physician or provider since your last visit? Yes - Records Obtained  Have you had any other diagnostic tests since your last visit? No  Have you been seen in the emergency room and/or had an admission to a hospital since we last saw you? No  Have you had your routine dental cleaning in the past 6 months? no    Have you activated your happyview account? If not, what are your barriers?  Yes     Patient Care Team:  Hair Moody MD as PCP - General (Family Medicine)  Hair Moody MD as PCP - [facility] Provider    Medical History Review  Past Medical, Family, and Social History reviewed and does contribute to the patient presenting condition    Health Maintenance   Topic Date Due    Hepatitis C screen  1950    Shingles Vaccine (1 of 2) 10/19/2000    Lipid screen  01/22/2020    Flu vaccine (1) 09/01/2020    Colon Cancer Screen FIT/FOBT  09/10/2020    Annual Wellness Visit (AWV)  09/24/2020    Breast cancer screen  01/22/2021    DTaP/Tdap/Td vaccine (3 - Td) 02/08/2030    DEXA (modify frequency per FRAX score)  Completed    Pneumococcal 65+ years Vaccine  Completed    Hepatitis A vaccine  Aged Out    Hepatitis B vaccine  Aged Out    Hib vaccine  Aged Out    Meningococcal (ACWY) vaccine  Aged Out

## 2020-07-20 NOTE — PATIENT INSTRUCTIONS
Patient Education        Learning About Low-Carbohydrate Diets  What is a low-carbohydrate diet? A low-carbohydrate (or \"low-carb\") diet limits foods and drinks that have carbohydrates. This includes grains, fruits, milk and yogurt, and starchy vegetables like potatoes, beans, and corn. It also avoids foods and drinks that have added sugar. Instead, low-carb diets include foods that are high in protein and fat. Why might you follow a low-carb diet? Low-carb diets may be used for a variety of reasons, such as for weight loss. People who have diabetes may use a low-carb diet to help manage their blood sugar levels. What should you do before you start the diet? Talk to your doctor before you try any diet. This is even more important if you have health problems like kidney disease, heart disease, or diabetes. Your doctor may suggest that you meet with a registered dietitian. A dietitian can help you make an eating plan that works for you. What foods do you eat on a low-carb diet? On a low-carb diet, you choose foods that are high in protein and fat. Examples of these are:  · Meat, poultry, and fish. · Eggs. · Nuts, such as walnuts, pecans, almonds, and peanuts. · Peanut butter and other nut butters. · Tofu. · Avocado. · Gar Hero. · Non-starchy vegetables like broccoli, cauliflower, green beans, mushrooms, peppers, lettuce, and spinach. · Unsweetened non-dairy milks like almond milk and coconut milk. · Cheese, cottage cheese, and cream cheese. Current as of: August 22, 2019               Content Version: 12.5  © 3265-4083 Healthwise, [employer]. Care instructions adapted under license by [address] If you have questions about a medical condition or this instruction, always ask your healthcare professional. [address] any warranty or liability for your use of this information. Patient Education        Learning About Low-Fat Eating  What is low-fat eating?     Most food has some fat in it. Your body needs some fat to be healthy. But some kinds of fats are healthier than others. In a low-fat eating plan, you try to choose healthier fats and eat fewer unhealthy fats. Healthy fats include olive and canola oil. Try to avoid eating too much saturated fat (such as in cheese and meats) and trans fat (a type of fat found in many packaged snack foods and other baked goods). You do not need to cut all fat from your diet. But you can make healthier choices about the types and amount of fat you eat. Even though it is a good idea to choose healthier fats, it is still important to be careful of how much fat you eat, because all fats are high in calories. What are the different types of fats? Unhealthy fat  · Saturated fat. Saturated fats are mostly in animal foods, such as meat and dairy foods. Tropical oils, such as coconut oil, palm oil, and cocoa butter, are also saturated fats. Healthy fats  · Monounsaturated fat. Monounsaturated fats are liquid at room temperature but get solid when refrigerated. Eating foods that are high in this fat may help lower your \"bad\" (LDL) cholesterol, keep your \"good\" (HDL) cholesterol level up, and lower your chances of getting coronary artery disease. This fat is found in canola oil, olive oil, peanut oil, olives, avocados, nuts, and nut butters. · Polyunsaturated fat. Polyunsaturated fats are liquid at room temperature. They are in safflower, sunflower, and corn oils. They are also the main fat in seafood. Omega-3 fatty acids are types of polyunsaturated fat. Eating fish may lower your chances of getting coronary artery disease. Fatty fish such as salmon and mackerel contain these healthy fatty acids. So do ground flaxseeds and flaxseed oil, soybeans, walnuts, and seeds. Why cut down on unhealthy fats? Eating foods that contain saturated fats can raise the LDL (\"bad\") cholesterol in your blood.  Having a high level of LDL cholesterol increases your chance of hardening of the arteries (atherosclerosis), which can lead to heart disease, heart attack, and stroke. Trans fat raises the level of \"bad\" LDL cholesterol in your blood and may lower the \"good\" HDL cholesterol in your blood. Trans fat can raise your risk of heart disease, heart attack, and stroke. In general:  · No more than 10% of your daily calories should come from saturated fat. This is about 20 grams in a 2,000-calorie diet. · No more than 10% of your daily calories should come from polyunsaturated fat. This is about 20 grams in a 2,000-calorie diet. · Monounsaturated fats can be up to 15% of your daily calories. This is about 25 to 30 grams in a 2,000-calorie diet. If you're not sure how much fat you should be eating or how many calories you need each day to stay at a healthy weight, talk to a registered dietitian. He or she can help you create a plan that's right for you. What can you do to cut down on fat? Foods like cheese, butter, sausage, and desserts can have a lot of unhealthy fats. Try these tips for healthier meals at home and when you eat out. At home  · Fill up on fruits, vegetables, and whole grains. · Think of meat as a side dish instead of as the main part of your meal.  · When you do eat meat, make it extra-lean ground beef (97% lean), ground turkey breast (without skin added), meats with fat trimmed off before cooking, or skinless chicken. · Try main dishes that use whole wheat pasta, brown rice, dried beans, or vegetables. · Use cooking methods that use little or no fat, such as broiling, steaming, or grilling. Use cooking spray instead of oil. If you use oil, use a monounsaturated oil, such as canola or olive oil. · Read food labels on canned, bottled, or packaged foods. Choose those with little saturated fat and no trans fat. When eating out at a restaurant  · Order foods that are broiled or poached instead of fried or breaded.   · Cut back on the amount of butter or margarine that you use on bread. Use small amounts of olive oil instead. · Order sauces, gravies, and salad dressings on the side, and use only a little. · When you order pasta, choose tomato sauce instead of cream sauce. · Ask for salsa with your baked potato instead of sour cream, butter, cheese, or collins. Where can you learn more? Go to https://ProxinopeONStoreb.LocalEats. org and sign in to your [employer] account. Enter C082 in the MobileWeaver box to learn more about \"Learning About Low-Fat Eating. \"     If you do not have an account, please click on the \"Sign Up Now\" link. Current as of: August 22, 2019               Content Version: 12.5  © 3829-7847 Healthwise, [employer]. Care instructions adapted under license by [facility] ([facility]). If you have questions about a medical condition or this instruction, always ask your healthcare professional. [place] [address] any warranty or liability for your use of this information.

## 2020-07-20 NOTE — PROGRESS NOTES
Chief Complaint   Patient presents with    Hyperlipidemia    Obesity         Nallely Mcdermott  here today for follow up on chronic medical problems, go over labs and/or diagnostic studies, and medication refills. Hyperlipidemia and Obesity      HPI: Patient is here for follow-up for hyperlipidemia osteoporosis. Patient reports she has not done any Prolia so far, reports she was busy with her friend. Prolia reprinted. Patient has not done blood work. Hyperlipidemia on statins and aspirin reports compliance. Obesity patient reports she has gained 10 pounds in the last 6 months. Patient has not not been working. Patient reports she walks 1 mile every day. /84   Pulse 74   Temp 97.7 °F (36.5 °C) (Temporal)   Ht 5' 2\" (1.575 m)   Wt 202 lb 12.8 oz (92 kg)   SpO2 98%   BMI 37.09 kg/m²    Body mass index is 37.09 kg/m². Wt Readings from Last 3 Encounters:   07/20/20 202 lb 12.8 oz (92 kg)   02/08/20 190 lb (86.2 kg)   12/03/19 195 lb 6.4 oz (88.6 kg)        []Negative depression screening. PHQ Scores 7/20/2020 12/3/2019 9/24/2019 1/16/2019   PHQ2 Score 0 0 0 0   PHQ9 Score 0 0 0 0      []1-4 = Minimal depression   []5-9 = Milddepression   []10-14 = Moderate depression   []15-19 = Moderately severe depression   []20-27 = Severe depression    Discussed testing with the patient and all questions fully answered.     Orders Only on 09/10/2019   Component Date Value Ref Range Status    OCCULT BLOOD FECAL 09/10/2019 negative   Final    Control 09/10/2019 present   Final         Most recent labs reviewed:     Lab Results   Component Value Date    WBC 3.9 01/22/2019    HGB 14.9 01/22/2019    HCT 44.6 01/22/2019    MCV 91.3 01/22/2019     01/22/2019       @BRIEFLAB(NA,K,CL,CO2,BUN,CREATININE,GLUCOSE,CALCIUM)@     Lab Results   Component Value Date    ALT 13 01/22/2019    AST 19 01/22/2019    ALKPHOS 72 01/22/2019    BILITOT 0.66 01/22/2019       No results found for: TSHFT4, TSH    Lab Pack years: 12.50     Types: Cigarettes     Start date: 3/1/1966     Last attempt to quit: 2015     Years since quittin.5    Smokeless tobacco: Never Used    Tobacco comment: quit  ; age start    Substance and Sexual Activity    Alcohol use: No    Drug use: No    Sexual activity: Not Currently   Lifestyle    Physical activity     Days per week: Not on file     Minutes per session: Not on file    Stress: Not on file   Relationships    Social connections     Talks on phone: Not on file     Gets together: Not on file     Attends Taoism service: Not on file     Active member of club or organization: Not on file     Attends meetings of clubs or organizations: Not on file     Relationship status: Not on file    Intimate partner violence     Fear of current or ex partner: Not on file     Emotionally abused: Not on file     Physically abused: Not on file     Forced sexual activity: Not on file   Other Topics Concern    Not on file   Social History Narrative    Not on file     Counseling given: Yes  Comment: quit  ; age start         Family History   Problem Relation Age of Onset    Heart Attack Mother     Heart Disease Mother     Coronary Art Dis Mother          from complication [facility] age 67   [place] COPD Father         death age 66 pulm disease     High Blood Pressure Sister     Mult Sclerosis Sister     Rheum Arthritis Sister     Cancer Sister 27        Geoblastoma    Diabetes Brother     Cancer Sister 27        leukemia    Lung Cancer Brother     Alcohol Abuse Brother              -rest of complaints with corresponding details per ROS    The patient's past medical, surgical, social, and family history as well as her current medications and allergies were reviewed as documented intoday's encounter. Review of Systems   Constitutional: Positive for unexpected weight change. Negative for activity change, diaphoresis and fever.    HENT: Negative for congestion, nosebleeds and osteoporosis without current pathological fracture  Reprint Prolia continue vitamin D  - denosumab (PROLIA) 60 MG/ML SOSY SC injection; Inject 1 mL into the skin every 6 months  Dispense: 1 mL; Refill: 3    3. Obesity, Class II, BMI 35-39.9, no comorbidity  Handout provided for diet and exercise, discussed with patient to improve physical activity to 2 miles daily and watch her diet. No orders of the defined types were placed in this encounter. Medications Discontinued During This Encounter   Medication Reason    denosumab (PROLIA) 60 MG/ML SOSY SC injection REORDER       Nallely received counseling on the following healthy behaviors: nutrition, exercise and medication adherence  Reviewed prior labs and health maintenance  Continue current medications, diet and exercise. Discussed use, benefit, and side effects of prescribed medications. Barriers to medication compliance addressed. Patient given educational materials - see patient instructions  Was a self-tracking handout given in paper form or via [employer]? Yes    Requested Prescriptions     Signed Prescriptions Disp Refills    denosumab (PROLIA) 60 MG/ML SOSY SC injection 1 mL 3     Sig: Inject 1 mL into the skin every 6 months       All patient questions answered. Patient voiced understanding. Quality Measures    Body mass index is 37.09 kg/m². Elevated. Weight control planned discussed Healthy diet and regular exercise. BP: 128/84. Blood pressure is normal. Treatment plan consists of Weight Reduction, Increased Physical Activity and No treatment change needed. Fall Risk 9/24/2019 1/16/2019   2 or more falls in past year? no no   Fall with injury in past year? no yes     The patient does not have a history of falls. I did , complete a risk assessment for falls.  A plan of care for falls in-office gait and balance testing performed using The Timed Up and Go Test was negative for increased falls risk- no further intervention is currently indicated, No Treatment plan indicated    Lab Results   Component Value Date    LDLCHOLESTEROL 196 (H) 01/22/2019    (goal LDL reduction with dx if diabetes is 50% LDL reduction)    PHQ Scores 7/20/2020 12/3/2019 9/24/2019 1/16/2019   PHQ2 Score 0 0 0 0   PHQ9 Score 0 0 0 0     Interpretation of Total Score Depression Severity: 1-4 = Minimal depression, 5-9 = Mild depression, 10-14 = Moderate depression, 15-19 = Moderately severe depression, 20-27 = Severe depression      The patient'spast medical, surgical, social, and family history as well as her   current medications and allergies were reviewed as documented in today's encounter. Medications, labs, diagnostic studies, consultations andfollow-up as documented in this encounter. Return in about 3 months (around 10/20/2020) for lab work. Patient wasseen with total face to face time of 15 minutes. More than 50% of this visit was counseling and education. Future Appointments   Date Time Provider Aleida Desir   10/22/2020  8:15 AM [facility]     This note was completed by using the assistance of a speech-recognition program. However, inadvertent computerized transcription errors may be present. Althoughevery effort was made to ensure accuracy, no guarantees can be provided that every mistake has been identified and corrected by editing.   Electronically signed by Renan Fontana MD on 7/20/2020  9:25 AM

## 2020-07-23 ENCOUNTER — TELEPHONE (OUTPATIENT)
Dept: INFUSION THERAPY | Age: 70
End: 2020-07-23

## 2020-08-06 ENCOUNTER — HOSPITAL ENCOUNTER (OUTPATIENT)
Dept: INPATIENT UNIT | Age: 70
Setting detail: THERAPIES SERIES
Discharge: HOME | End: 2020-08-06
Payer: MEDICARE

## 2020-08-06 VITALS
DIASTOLIC BLOOD PRESSURE: 82 MMHG | HEART RATE: 92 BPM | SYSTOLIC BLOOD PRESSURE: 138 MMHG | RESPIRATION RATE: 16 BRPM | OXYGEN SATURATION: 100 % | TEMPERATURE: 96.8 F

## 2020-08-06 PROCEDURE — 6360000002 HC RX W HCPCS: Performed by: FAMILY MEDICINE

## 2020-08-06 PROCEDURE — 96372 THER/PROPH/DIAG INJ SC/IM: CPT

## 2020-08-06 RX ORDER — SODIUM CHLORIDE 9 MG/ML
INJECTION, SOLUTION INTRAVENOUS CONTINUOUS
Status: CANCELLED | OUTPATIENT
Start: 2021-02-04

## 2020-08-06 RX ORDER — DIPHENHYDRAMINE HYDROCHLORIDE 50 MG/ML
50 INJECTION INTRAMUSCULAR; INTRAVENOUS ONCE
Status: CANCELLED | OUTPATIENT
Start: 2021-02-04

## 2020-08-06 RX ORDER — EPINEPHRINE 1 MG/ML
0.3 INJECTION, SOLUTION, CONCENTRATE INTRAVENOUS PRN
Status: CANCELLED | OUTPATIENT
Start: 2021-02-04

## 2020-08-06 RX ORDER — METHYLPREDNISOLONE SODIUM SUCCINATE 125 MG/2ML
125 INJECTION, POWDER, LYOPHILIZED, FOR SOLUTION INTRAMUSCULAR; INTRAVENOUS ONCE
Status: CANCELLED | OUTPATIENT
Start: 2021-02-04

## 2020-08-06 RX ADMIN — DENOSUMAB 60 MG: 60 INJECTION SUBCUTANEOUS at 08:08

## 2020-10-19 RX ORDER — AVOBENZONE, HOMOSALATE, OCTISALATE, OCTOCRYLENE 30; 100; 50; 25 MG/ML; MG/ML; MG/ML; MG/ML
1000 SPRAY TOPICAL DAILY
Qty: 30 TABLET | Refills: 3 | Status: SHIPPED | OUTPATIENT
Start: 2020-10-19 | End: 2022-02-15 | Stop reason: SDUPTHER

## 2020-10-19 NOTE — TELEPHONE ENCOUNTER
Please Approve or Refuse.   Send to Pharmacy per Pt's Request:      Next Visit Date:  10/22/2020   Last Visit Date: 7/20/2020    No results found for: LABA1C          ( goal A1C is < 7)   BP Readings from Last 3 Encounters:   08/06/20 138/82   07/20/20 128/84   02/08/20 (!) 186/86          (goal 120/80)  BUN   Date Value Ref Range Status   07/20/2020 22 8 - 23 mg/dL Final     CREATININE   Date Value Ref Range Status   07/20/2020 0.67 0.50 - 0.90 mg/dL Final     Potassium   Date Value Ref Range Status   07/20/2020 4.6 3.7 - 5.3 mmol/L Final

## 2020-10-22 ENCOUNTER — OFFICE VISIT (OUTPATIENT)
Dept: FAMILY MEDICINE CLINIC | Age: 70
End: 2020-10-22
Payer: MEDICARE

## 2020-10-22 VITALS
OXYGEN SATURATION: 90 % | HEIGHT: 62 IN | TEMPERATURE: 97.9 F | WEIGHT: 199 LBS | BODY MASS INDEX: 36.62 KG/M2 | HEART RATE: 98 BPM | DIASTOLIC BLOOD PRESSURE: 76 MMHG | SYSTOLIC BLOOD PRESSURE: 130 MMHG

## 2020-10-22 PROBLEM — E66.01 MORBIDLY OBESE (HCC): Status: ACTIVE | Noted: 2020-10-22

## 2020-10-22 PROCEDURE — G8399 PT W/DXA RESULTS DOCUMENT: HCPCS | Performed by: FAMILY MEDICINE

## 2020-10-22 PROCEDURE — 1036F TOBACCO NON-USER: CPT | Performed by: FAMILY MEDICINE

## 2020-10-22 PROCEDURE — 99213 OFFICE O/P EST LOW 20 MIN: CPT | Performed by: FAMILY MEDICINE

## 2020-10-22 PROCEDURE — G8482 FLU IMMUNIZE ORDER/ADMIN: HCPCS | Performed by: FAMILY MEDICINE

## 2020-10-22 PROCEDURE — 90653 IIV ADJUVANT VACCINE IM: CPT | Performed by: FAMILY MEDICINE

## 2020-10-22 PROCEDURE — G0008 ADMIN INFLUENZA VIRUS VAC: HCPCS | Performed by: FAMILY MEDICINE

## 2020-10-22 PROCEDURE — 1123F ACP DISCUSS/DSCN MKR DOCD: CPT | Performed by: FAMILY MEDICINE

## 2020-10-22 PROCEDURE — G8427 DOCREV CUR MEDS BY ELIG CLIN: HCPCS | Performed by: FAMILY MEDICINE

## 2020-10-22 PROCEDURE — G8417 CALC BMI ABV UP PARAM F/U: HCPCS | Performed by: FAMILY MEDICINE

## 2020-10-22 PROCEDURE — 1090F PRES/ABSN URINE INCON ASSESS: CPT | Performed by: FAMILY MEDICINE

## 2020-10-22 PROCEDURE — 4040F PNEUMOC VAC/ADMIN/RCVD: CPT | Performed by: FAMILY MEDICINE

## 2020-10-22 PROCEDURE — 3017F COLORECTAL CA SCREEN DOC REV: CPT | Performed by: FAMILY MEDICINE

## 2020-10-22 SDOH — ECONOMIC STABILITY: FOOD INSECURITY: WITHIN THE PAST 12 MONTHS, THE FOOD YOU BOUGHT JUST DIDN'T LAST AND YOU DIDN'T HAVE MONEY TO GET MORE.: NEVER TRUE

## 2020-10-22 SDOH — ECONOMIC STABILITY: INCOME INSECURITY: HOW HARD IS IT FOR YOU TO PAY FOR THE VERY BASICS LIKE FOOD, HOUSING, MEDICAL CARE, AND HEATING?: NOT HARD AT ALL

## 2020-10-22 SDOH — ECONOMIC STABILITY: FOOD INSECURITY: WITHIN THE PAST 12 MONTHS, YOU WORRIED THAT YOUR FOOD WOULD RUN OUT BEFORE YOU GOT MONEY TO BUY MORE.: NEVER TRUE

## 2020-10-22 SDOH — ECONOMIC STABILITY: TRANSPORTATION INSECURITY
IN THE PAST 12 MONTHS, HAS THE LACK OF TRANSPORTATION KEPT YOU FROM MEDICAL APPOINTMENTS OR FROM GETTING MEDICATIONS?: NO

## 2020-10-22 SDOH — ECONOMIC STABILITY: TRANSPORTATION INSECURITY
IN THE PAST 12 MONTHS, HAS LACK OF TRANSPORTATION KEPT YOU FROM MEETINGS, WORK, OR FROM GETTING THINGS NEEDED FOR DAILY LIVING?: NO

## 2020-10-22 ASSESSMENT — ENCOUNTER SYMPTOMS
WHEEZING: 0
NAUSEA: 0
COLOR CHANGE: 0
SHORTNESS OF BREATH: 0
BLOOD IN STOOL: 0
VOMITING: 0
SINUS PRESSURE: 0
COUGH: 0
PHOTOPHOBIA: 0
CONSTIPATION: 0
SINUS PAIN: 0
ABDOMINAL DISTENTION: 0
CHEST TIGHTNESS: 0
ABDOMINAL PAIN: 0
FACIAL SWELLING: 0

## 2020-10-22 NOTE — PROGRESS NOTES
Visit Information    Have you changed or started any medications since your last visit including any over-the-counter medicines, vitamins, or herbal medicines? no   Have you stopped taking any of your medications? Is so, why? -  no  Are you having any side effects from any of your medications? - no    Have you seen any other physician or provider since your last visit?  no   Have you had any other diagnostic tests since your last visit?  no   Have you been seen in the emergency room and/or had an admission in a hospital since we last saw you?  no   Have you had your routine dental cleaning in the past 6 months?  no     Do you have an active MyChart account? If no, what is the barrier?   Yes    Patient Care Team:  Job Guzman MD as PCP - General (Family Medicine)  Job Guzman MD as PCP - [facility]    Medical History Review  Past Medical, Family, and Social History reviewed and does contribute to the patient presenting condition    Health Maintenance   Topic Date Due    Shingles Vaccine (1 of 2) 10/19/2000    Annual Wellness Visit (AWV)  05/29/2019    Flu vaccine (1) 09/01/2020    Colon Cancer Screen FIT/FOBT  09/10/2020    Breast cancer screen  01/22/2021    Lipid screen  07/20/2021    DTaP/Tdap/Td vaccine (3 - Td) 02/08/2030    DEXA (modify frequency per FRAX score)  Completed    Pneumococcal 65+ years Vaccine  Completed    Hepatitis C screen  Completed    Hepatitis A vaccine  Aged Out    Hepatitis B vaccine  Aged Out    Hib vaccine  Aged Out    Meningococcal (ACWY) vaccine  Aged Out

## 2020-10-22 NOTE — PATIENT INSTRUCTIONS
Patient Education        Learning About Low-Carbohydrate Diets  What is a low-carbohydrate diet? A low-carbohydrate (or \"low-carb\") diet limits foods and drinks that have carbohydrates. This includes grains, fruits, milk and yogurt, and starchy vegetables like potatoes, beans, and corn. It also avoids foods and drinks that have added sugar. Instead, low-carb diets include foods that are high in protein and fat. Why might you follow a low-carb diet? Low-carb diets may be used for a variety of reasons, such as for weight loss. People who have diabetes may use a low-carb diet to help manage their blood sugar levels. What should you do before you start the diet? Talk to your doctor before you try any diet. This is even more important if you have health problems like kidney disease, heart disease, or diabetes. Your doctor may suggest that you meet with a registered dietitian. A dietitian can help you make an eating plan that works for you. What foods do you eat on a low-carb diet? On a low-carb diet, you choose foods that are high in protein and fat. Examples of these are:  · Meat, poultry, and fish. · Eggs. · Nuts, such as walnuts, pecans, almonds, and peanuts. · Peanut butter and other nut butters. · Tofu. · Avocado. · Ceclia Ramal. · Non-starchy vegetables like broccoli, cauliflower, green beans, mushrooms, peppers, lettuce, and spinach. · Unsweetened non-dairy milks like almond milk and coconut milk. · Cheese, cottage cheese, and cream cheese. Current as of: August 22, 2019               Content Version: 12.6  © 6976-6421 [employer]. Care instructions adapted under license by [facility] ([facility]). If you have questions about a medical condition or this instruction, always ask your healthcare professional. [address]  any warranty or liability for your use of this information. Patient Education        Learning About Low-Fat Eating  What is low-fat eating?     Most food has some fat in it. Your body needs some fat to be healthy. But some kinds of fats are healthier than others. In a low-fat eating plan, you try to choose healthier fats and eat fewer unhealthy fats. Healthy fats include olive and canola oil. Try to avoid eating too much saturated fat (such as in cheese and meats) and trans fat (a type of fat found in many packaged snack foods and other baked goods). You do not need to cut all fat from your diet. But you can make healthier choices about the types and amount of fat you eat. Even though it is a good idea to choose healthier fats, it is still important to be careful of how much fat you eat, because all fats are high in calories. What are the different types of fats? Unhealthy fat  · Saturated fat. Saturated fats are mostly in animal foods, such as meat and dairy foods. Tropical oils, such as coconut oil, palm oil, and cocoa butter, are also saturated fats. Healthy fats  · Monounsaturated fat. Monounsaturated fats are liquid at room temperature but get solid when refrigerated. Eating foods that are high in this fat may help lower your \"bad\" (LDL) cholesterol, keep your \"good\" (HDL) cholesterol level up, and lower your chances of getting coronary artery disease. This fat is found in canola oil, olive oil, peanut oil, olives, avocados, nuts, and nut butters. · Polyunsaturated fat. Polyunsaturated fats are liquid at room temperature. They are in safflower, sunflower, and corn oils. They are also the main fat in seafood. Omega-3 fatty acids are types of polyunsaturated fat. Eating fish may lower your chances of getting coronary artery disease. Fatty fish such as salmon and mackerel contain these healthy fatty acids. So do ground flaxseeds and flaxseed oil, soybeans, walnuts, and seeds. Why cut down on unhealthy fats? Eating foods that contain saturated fats can raise the LDL (\"bad\") cholesterol in your blood.  Having a high level of LDL cholesterol increases or margarine that you use on bread. Use small amounts of olive oil instead. · Order sauces, gravies, and salad dressings on the side, and use only a little. · When you order pasta, choose tomato sauce instead of cream sauce. · Ask for salsa with your baked potato instead of sour cream, butter, cheese, or collins. Where can you learn more? Go to [email or web address] org and sign in to your [employer] account. Enter Z509 in the [employer] box to learn more about \"Learning About Low-Fat Eating. \"     If you do not have an account, please click on the \"Sign Up Now\" link. Current as of: August 22, 2019               Content Version: 12.6  © 8351-5411 [employer]. Care instructions adapted under license by [facility] ([facility]). If you have questions about a medical condition or this instruction, always ask your healthcare professional. [address] any warranty or liability for your use of this information.

## 2020-10-22 NOTE — PROGRESS NOTES
Chloride 07/20/2020 104  98 - 107 mmol/L Final    CO2 07/20/2020 26  20 - 31 mmol/L Final    Anion Gap 07/20/2020 11  9 - 17 mmol/L Final    Alkaline Phosphatase 07/20/2020 74  35 - 104 U/L Final    ALT 07/20/2020 15  5 - 33 U/L Final    AST 07/20/2020 18  <32 U/L Final    Total Bilirubin 07/20/2020 0.34  0.3 - 1.2 mg/dL Final    Total Protein 07/20/2020 7.7  6.4 - 8.3 g/dL Final    Alb 07/20/2020 4.1  3.5 - 5.2 g/dL Final    Albumin/Globulin Ratio 07/20/2020 NOT REPORTED  1.0 - 2.5 Final    GFR Non- 07/20/2020 >60  >60 mL/min Final    GFR  07/20/2020 >60  >60 mL/min Final    GFR Comment 07/20/2020        Final    Comment: Average GFR for 61-76 years old:   80 mL/min/1.73sq m  Chronic Kidney Disease:   <60 mL/min/1.73sq m  Kidney failure:   <15 mL/min/1.73sq m              eGFR calculated using average adult body mass.  Additional eGFR calculator available at:        [email or web address]            GFR Staging 07/20/2020 NOT REPORTED   Final    WBC 07/20/2020 4.6  3.5 - 11.0 k/uL Final    RBC 07/20/2020 4.93  4.0 - 5.2 m/uL Final    Hemoglobin 07/20/2020 15.2  12.0 - 16.0 g/dL Final    Hematocrit 07/20/2020 45.7  36 - 46 % Final    MCV 07/20/2020 92.8  80 - 100 fL Final    MCH 07/20/2020 30.9  26 - 34 pg Final    MCHC 07/20/2020 33.3  31 - 37 g/dL Final    RDW 07/20/2020 13.1  11.5 - 14.9 % Final    Platelets 90/74/5856 279  150 - 450 k/uL Final    MPV 07/20/2020 8.6  6.0 - 12.0 fL Final    NRBC Automated 07/20/2020 NOT REPORTED  per 100 WBC Final    Cholesterol 07/20/2020 194  <200 mg/dL Final    Comment:    Cholesterol Guidelines:      <200  Desirable   200-240  Borderline      >240  Undesirable         HDL 07/20/2020 64  >40 mg/dL Final    Comment:    HDL Guidelines:    <40     Undesirable   40-59    Borderline    >59     Desirable         LDL Cholesterol 07/20/2020 115  0 - 130 mg/dL Final    Comment:    LDL Guidelines:     <100 REPORTED 07/20/2020    VLDL NOT REPORTED 01/22/2019     Lab Results   Component Value Date    CHOLHDLRATIO 3.0 07/20/2020    CHOLHDLRATIO 4.9 01/22/2019       No results found for: LABA1C    No results found for: TCGYCHIC30    No results found for: FOLATE    No results found for: IRON, TIBC, FERRITIN    No results found for: VITD25          Current Outpatient Medications   Medication Sig Dispense Refill    vitamin D (RA VITAMIN D-3) 25 MCG (1000 UT) TABS tablet Take 1 tablet by mouth daily 30 tablet 3    denosumab (PROLIA) 60 MG/ML SOSY SC injection Inject 1 mL into the skin every 6 months 1 mL 3    atorvastatin (LIPITOR) 20 MG tablet take 1 tablet by mouth once daily 30 tablet 3    RA ASPIRIN EC 81 MG EC tablet take 1 tablet by mouth once daily 90 tablet 1    acetaminophen (APAP EXTRA STRENGTH) 500 MG tablet Take 2 tablets by mouth every 6 hours as needed for Pain 30 tablet 0    ibuprofen (ADVIL;MOTRIN) 600 MG tablet Take 1 tablet by mouth every 6 hours as needed for Pain 30 tablet 0    vitamin D (CHOLECALCIFEROL) 1000 UNIT TABS tablet Take 1 tablet by mouth daily VITAMIN D3. OK to substitute 90 tablet 3    Multiple Vitamin (MULTI VITAMIN PO) Take by mouth       No current facility-administered medications for this visit.               Social History     Socioeconomic History    Marital status: Single     Spouse name: Not on file    Number of children: 3    Years of education: 15    Highest education level: Associate degree: occupational, technical, or vocational program   Occupational History     Employer: UNEMPLOYED   Social Needs    Financial resource strain: Not hard at all   [place] insecurity     Worry: Never true     Inability: Never true    Transportation needs     Medical: No     Non-medical: No   Tobacco Use    Smoking status: Former Smoker     Packs/day: 0.25     Years: 50.00     Pack years: 12.50     Types: Cigarettes     Start date: 3/1/1966     Last attempt to quit: 1/1/2015     Years since quittin.8    Smokeless tobacco: Never Used    Tobacco comment: quit  ; age start    Substance and Sexual Activity    Alcohol use: No    Drug use: No    Sexual activity: Not Currently   Lifestyle    Physical activity     Days per week: Not on file     Minutes per session: Not on file    Stress: Not on file   Relationships    Social connections     Talks on phone: Not on file     Gets together: Not on file     Attends Jewish service: Not on file     Active member of club or organization: Not on file     Attends meetings of clubs or organizations: Not on file     Relationship status: Not on file    Intimate partner violence     Fear of current or ex partner: Not on file     Emotionally abused: Not on file     Physically abused: Not on file     Forced sexual activity: Not on file   Other Topics Concern    Not on file   Social History Narrative    Not on file     Counseling given: Not Answered  Comment: quit  ; age start         Family History   Problem Relation Age of Onset    Heart Attack Mother     Heart Disease Mother     Coronary Art Dis Mother          from complication [facility] age 67   [employer] COPD Father         death age 66 pulm disease     High Blood Pressure Sister     Mult Sclerosis Sister     Rheum Arthritis Sister     Cancer Sister 27        Geoblastoma    Diabetes Brother     Cancer Sister 27        leukemia    Lung Cancer Brother     Alcohol Abuse Brother              -rest of complaints with corresponding details per ROS    The patient's past medical, surgical, social, and family history as well as her current medications and allergies were reviewed as documented intoday's encounter. Review of Systems   Constitutional: Negative for activity change, appetite change, diaphoresis, fatigue, fever and unexpected weight change. HENT: Negative for congestion, facial swelling, hearing loss, mouth sores, nosebleeds, sinus pressure and sinus pain.     Eyes: Negative for photophobia and visual disturbance. Respiratory: Negative for cough, chest tightness, shortness of breath and wheezing. Cardiovascular: Negative for chest pain, palpitations and leg swelling. Gastrointestinal: Negative for abdominal distention, abdominal pain, blood in stool, constipation, nausea and vomiting. Endocrine: Negative for polyuria. Genitourinary: Negative for decreased urine volume, difficulty urinating, flank pain, frequency, genital sores, hematuria, urgency and vaginal pain. Musculoskeletal: Negative for arthralgias, gait problem, myalgias, neck pain and neck stiffness. Skin: Negative for color change. Neurological: Negative for dizziness, speech difficulty, weakness, light-headedness and numbness. Psychiatric/Behavioral: Negative for agitation, decreased concentration, dysphoric mood, sleep disturbance and suicidal ideas. The patient is not nervous/anxious. Physical Exam    PHYSICAL EXAM:   VITALS:   Vitals:    10/22/20 0825   BP: 130/76   Pulse: 98   Temp: 97.9 °F (36.6 °C)   SpO2: 90%     GENERAL:  Patient is a well-developed, well-nourished female  in no acute distress, alert and oriented x3, appropriate and pleasant conversation. HEAD: Normocephalic, atraumatic. EYES: Pupils equal, round and reactive to light and accommodation, extraocular   movements intact. ENT: Moist mucous membranes. No erythema is noted. NECK: Supple. No masses. No lymphadenopathy. CARDIOVASCULAR: Regular rate and rhythm. PULMONARY: Lungs are clear to auscultation bilaterally. ABDOMEN: Soft, nontender, nondistended. Positive bowel sounds. MUSCULOSKELETAL: Strength 5/5 bilaterally in all extremities. No tenderness to   palpation of the ribs, long bones, or spine. NEUROLOGIC: Cranial nerves II through XII grossly intact. No focal deficits are noted. ASSESSMENT AND PLAN      1. Mixed hyperlipidemia  -Stable on recent blood work continue same medications.   Continue watching her diet    2. Morbidly obese ([address]  -Handout provided for exercise and dietary recommendations, discussed about low-carb low-fat diet. 3. Age-related osteoporosis without current pathological fracture  Continue Prolia and vitamin D    4. Need for vaccination    - INFLUENZA, TRIV, INACTIVATED, SUBUNIT, ADJUVANTED, 65 YRS AND OLDER, IM, PREFILL SYR, 0.5ML (FLUAD TRIV)    5. Colon cancer screening    - Cologuard (For External Results Only); Future      Orders Placed This Encounter   Procedures    Cologuard (For External Results Only)     This test is performed by an external laboratory and is used for result attachment only. It is required that this order requisition be faxed to: [facility] @ [phone]. See [email or web address] for further information. Standing Status:   Future     Standing Expiration Date:   10/22/2021    INFLUENZA, TRIV, INACTIVATED, SUBUNIT, ADJUVANTED, 65 YRS AND OLDER, IM, PREFILL SYR, 0.5ML (FLUAD TRIV)         There are no discontinued medications. Nallely received counseling on the following healthy behaviors: nutrition, exercise and medication adherence  Reviewed prior labs and health maintenance  Continue current medications, diet and exercise. Discussed use, benefit, and side effects of prescribed medications. Barriers to medication compliance addressed. Patient given educational materials - see patient instructions  Was a self-tracking handout given in paper form or via [employer]? Yes    Requested Prescriptions      No prescriptions requested or ordered in this encounter       All patient questions answered. Patient voiced understanding. Quality Measures    Body mass index is 36.4 kg/m². Elevated. Weight control planned discussed Healthy diet and regular exercise. BP: 130/76. Blood pressure is normal. Treatment plan consists of Weight Reduction, DASH Eating Plan, Dietary Sodium Restriction, Increased Physical Activity and No treatment change needed.     Fall Risk 10/22/2020 9/24/2019 1/16/2019   2 or more falls in past year? no no no   Fall with injury in past year? no no yes     The patient does not have a history of falls. I did , complete a risk assessment for falls. A plan of care for falls in-office gait and balance testing performed using The Timed Up and Go Test was negative for increased falls risk- no further intervention is currently indicated, No Treatment plan indicated    Lab Results   Component Value Date    LDLCHOLESTEROL 115 07/20/2020    (goal LDL reduction with dx if diabetes is 50% LDL reduction)    PHQ Scores 7/20/2020 12/3/2019 9/24/2019 1/16/2019   PHQ2 Score 0 0 0 0   PHQ9 Score 0 0 0 0     Interpretation of Total Score Depression Severity: 1-4 = Minimal depression, 5-9 = Mild depression, 10-14 = Moderate depression, 15-19 = Moderately severe depression, 20-27 = Severe depression      The patient'spast medical, surgical, social, and family history as well as her   current medications and allergies were reviewed as documented in today's encounter. Medications, labs, diagnostic studies, consultations andfollow-up as documented in this encounter. Return in about 6 weeks (around 12/3/2020) for for medicare well visit . Patient wasseen with total face to face time of 15 minutes. More than 50% of this visit was counseling and education. Future Appointments   Date Time Provider Aleida Desir   12/3/2020 11:30 AM Vickie Ramirez MD [facility]     This note was completed by using the assistance of a speech-recognition program. However, inadvertent computerized transcription errors may be present. Althoughevery effort was made to ensure accuracy, no guarantees can be provided that every mistake has been identified and corrected by editing.   Electronically signed by Vickie Ramirez MD on 10/22/2020  8:56 AM

## 2020-11-28 RX ORDER — ATORVASTATIN CALCIUM 20 MG/1
20 TABLET, FILM COATED ORAL DAILY
Qty: 90 TABLET | Refills: 3 | Status: SHIPPED | OUTPATIENT
Start: 2020-11-28 | End: 2022-02-21 | Stop reason: SDUPTHER

## 2020-11-28 RX ORDER — ASPIRIN 81 MG/1
81 TABLET ORAL DAILY
Qty: 90 TABLET | Refills: 1 | Status: SHIPPED | OUTPATIENT
Start: 2020-11-28 | Stop reason: SDUPTHER

## 2020-12-29 ENCOUNTER — OFFICE VISIT (OUTPATIENT)
Dept: FAMILY MEDICINE CLINIC | Age: 70
End: 2020-12-29
Payer: MEDICARE

## 2020-12-29 VITALS
WEIGHT: 205 LBS | OXYGEN SATURATION: 98 % | HEART RATE: 79 BPM | HEIGHT: 62 IN | BODY MASS INDEX: 37.73 KG/M2 | SYSTOLIC BLOOD PRESSURE: 126 MMHG | TEMPERATURE: 97.2 F | DIASTOLIC BLOOD PRESSURE: 80 MMHG

## 2020-12-29 PROCEDURE — 3017F COLORECTAL CA SCREEN DOC REV: CPT | Performed by: FAMILY MEDICINE

## 2020-12-29 PROCEDURE — G0439 PPPS, SUBSEQ VISIT: HCPCS | Performed by: FAMILY MEDICINE

## 2020-12-29 PROCEDURE — 1123F ACP DISCUSS/DSCN MKR DOCD: CPT | Performed by: FAMILY MEDICINE

## 2020-12-29 PROCEDURE — G8482 FLU IMMUNIZE ORDER/ADMIN: HCPCS | Performed by: FAMILY MEDICINE

## 2020-12-29 PROCEDURE — 4040F PNEUMOC VAC/ADMIN/RCVD: CPT | Performed by: FAMILY MEDICINE

## 2020-12-29 RX ORDER — ALPRAZOLAM 0.25 MG/1
0.25 TABLET ORAL 3 TIMES DAILY PRN
Qty: 3 TABLET | Refills: 0 | Status: SHIPPED | OUTPATIENT
Start: 2020-12-29 | End: 2021-01-03

## 2020-12-29 ASSESSMENT — PATIENT HEALTH QUESTIONNAIRE - PHQ9
SUM OF ALL RESPONSES TO PHQ9 QUESTIONS 1 & 2: 0
SUM OF ALL RESPONSES TO PHQ QUESTIONS 1-9: 0
1. LITTLE INTEREST OR PLEASURE IN DOING THINGS: 0
2. FEELING DOWN, DEPRESSED OR HOPELESS: 0

## 2020-12-29 ASSESSMENT — LIFESTYLE VARIABLES: HOW OFTEN DO YOU HAVE A DRINK CONTAINING ALCOHOL: 0

## 2020-12-29 NOTE — PROGRESS NOTES
Visit Information    Have you changed or started any medications since your last visit including any over-the-counter medicines, vitamins, or herbal medicines? no   Are you having any side effects from any of your medications? -  no  Have you stopped taking any of your medications? Is so, why? -  no    Have you seen any other physician or provider since your last visit? No  Have you had any other diagnostic tests since your last visit? Yes - covid test done 2 weeks ago   Have you been seen in the emergency room and/or had an admission to a hospital since we last saw you? No  Have you had your routine dental cleaning in the past 6 months? no    Have you activated your [employer] account? If not, what are your barriers?  Yes     Patient Care Team:  Randy Cavanaugh MD as PCP - General (Family Medicine)  Randy Cavanaugh MD as PCP - [facility]    Medical History Review  Past Medical, Family, and Social History reviewed and does contribute to the patient presenting condition    Health Maintenance   Topic Date Due    Shingles Vaccine (1 of 2) 10/19/2000    Annual Wellness Visit (AWV)  05/29/2019    Breast cancer screen  01/22/2021    Lipid screen  07/20/2021    Colon cancer screen fecal DNA test (Cologuard)  10/28/2023    DTaP/Tdap/Td vaccine (3 - Td) 02/08/2030    DEXA (modify frequency per FRAX score)  Completed    Flu vaccine  Completed    Pneumococcal 65+ years Vaccine  Completed    Hepatitis C screen  Completed    Hepatitis A vaccine  Aged Out    Hepatitis B vaccine  Aged Out    Hib vaccine  Aged Out    Meningococcal (ACWY) vaccine  Aged Out
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
## 2021-02-08 ENCOUNTER — HOSPITAL ENCOUNTER (OUTPATIENT)
Dept: INFUSION THERAPY | Age: 71
Setting detail: INFUSION SERIES
Discharge: HOME | End: 2021-02-08
Payer: MEDICARE

## 2021-02-08 VITALS
HEART RATE: 76 BPM | DIASTOLIC BLOOD PRESSURE: 81 MMHG | OXYGEN SATURATION: 98 % | SYSTOLIC BLOOD PRESSURE: 139 MMHG | RESPIRATION RATE: 18 BRPM | TEMPERATURE: 97.6 F

## 2021-02-08 DIAGNOSIS — M81.0 AGE-RELATED OSTEOPOROSIS WITHOUT CURRENT PATHOLOGICAL FRACTURE: Primary | ICD-10-CM

## 2021-02-08 LAB
CALCIUM SERPL-MCNC: 9.4 MG/DL (ref 8.6–10.4)
CREAT SERPL-MCNC: 0.69 MG/DL (ref 0.5–0.9)
GFR AFRICAN AMERICAN: >60 ML/MIN
GFR NON-AFRICAN AMERICAN: >60 ML/MIN
GFR SERPL CREATININE-BSD FRML MDRD: NORMAL ML/MIN/{1.73_M2}
GFR SERPL CREATININE-BSD FRML MDRD: NORMAL ML/MIN/{1.73_M2}

## 2021-02-08 PROCEDURE — 6360000002 HC RX W HCPCS: Performed by: FAMILY MEDICINE

## 2021-02-08 PROCEDURE — 96372 THER/PROPH/DIAG INJ SC/IM: CPT

## 2021-02-08 PROCEDURE — 36415 COLL VENOUS BLD VENIPUNCTURE: CPT

## 2021-02-08 PROCEDURE — 82565 ASSAY OF CREATININE: CPT

## 2021-02-08 PROCEDURE — 82310 ASSAY OF CALCIUM: CPT

## 2021-02-08 PROCEDURE — 96401 CHEMO ANTI-NEOPL SQ/IM: CPT

## 2021-02-08 RX ORDER — SODIUM CHLORIDE 9 MG/ML
INJECTION, SOLUTION INTRAVENOUS CONTINUOUS
Status: CANCELLED | OUTPATIENT
Start: 2021-08-02

## 2021-02-08 RX ORDER — DIPHENHYDRAMINE HYDROCHLORIDE 50 MG/ML
50 INJECTION INTRAMUSCULAR; INTRAVENOUS ONCE
Status: CANCELLED | OUTPATIENT
Start: 2021-08-02 | End: 2021-08-02

## 2021-02-08 RX ORDER — EPINEPHRINE 1 MG/ML
0.3 INJECTION, SOLUTION, CONCENTRATE INTRAVENOUS PRN
Status: CANCELLED | OUTPATIENT
Start: 2021-08-02

## 2021-02-08 RX ORDER — METHYLPREDNISOLONE SODIUM SUCCINATE 125 MG/2ML
125 INJECTION, POWDER, LYOPHILIZED, FOR SOLUTION INTRAMUSCULAR; INTRAVENOUS ONCE
Status: CANCELLED | OUTPATIENT
Start: 2021-08-02 | End: 2021-08-02

## 2021-02-08 RX ADMIN — DENOSUMAB 60 MG: 60 INJECTION SUBCUTANEOUS at 10:27

## 2021-02-08 NOTE — PROGRESS NOTES
Pt arrived for Prolia injection. Vitals obtained and labs drawn. Labs WNL. Injection indicated per written parameters. Injection given in L arm. Pt tolerated well. No s/s adverse reaction noted. Pt discharged home, ambulatory per self.

## 2021-05-28 DIAGNOSIS — E78.2 MIXED HYPERLIPIDEMIA: ICD-10-CM

## 2021-05-28 RX ORDER — ACETAMINOPHEN/DIPHENHYDRAMINE 500MG-25MG
TABLET ORAL
Qty: 90 TABLET | Refills: 1 | Status: SHIPPED | OUTPATIENT
Start: 2021-05-28 | End: 2021-12-03

## 2021-05-28 NOTE — TELEPHONE ENCOUNTER
Please Approve or Refuse.   Send to Pharmacy per Pt's Request:      Next Visit Date:  12/30/2021   Last Visit Date: 12/29/2020    No results found for: LABA1C          ( goal A1C is < 7)   BP Readings from Last 3 Encounters:   02/08/21 139/81   12/29/20 126/80   10/22/20 130/76          (goal 120/80)  BUN   Date Value Ref Range Status   07/20/2020 22 8 - 23 mg/dL Final     CREATININE   Date Value Ref Range Status   02/08/2021 0.69 0.50 - 0.90 mg/dL Final     Potassium   Date Value Ref Range Status   07/20/2020 4.6 3.7 - 5.3 mmol/L Final

## 2021-08-16 DIAGNOSIS — M81.0 AGE-RELATED OSTEOPOROSIS WITHOUT CURRENT PATHOLOGICAL FRACTURE: Primary | ICD-10-CM

## 2021-08-16 RX ORDER — DENOSUMAB 60 MG/ML
60 INJECTION SUBCUTANEOUS ONCE
Qty: 1 ML | Refills: 0 | Status: SHIPPED | OUTPATIENT
Start: 2021-08-16 | End: 2022-02-15 | Stop reason: SDUPTHER

## 2021-08-17 RX ORDER — DIPHENHYDRAMINE HYDROCHLORIDE 50 MG/ML
50 INJECTION INTRAMUSCULAR; INTRAVENOUS ONCE
Status: CANCELLED | OUTPATIENT
Start: 2021-08-17 | End: 2021-08-17

## 2021-08-17 RX ORDER — METHYLPREDNISOLONE SODIUM SUCCINATE 125 MG/2ML
125 INJECTION, POWDER, LYOPHILIZED, FOR SOLUTION INTRAMUSCULAR; INTRAVENOUS ONCE
Status: CANCELLED | OUTPATIENT
Start: 2021-08-17 | End: 2021-08-17

## 2021-08-17 RX ORDER — SODIUM CHLORIDE 9 MG/ML
INJECTION, SOLUTION INTRAVENOUS CONTINUOUS
Status: CANCELLED | OUTPATIENT
Start: 2021-08-17

## 2021-08-17 RX ORDER — EPINEPHRINE 1 MG/ML
0.3 INJECTION, SOLUTION, CONCENTRATE INTRAVENOUS PRN
Status: CANCELLED | OUTPATIENT
Start: 2021-08-17

## 2021-08-26 ENCOUNTER — TELEPHONE (OUTPATIENT)
Dept: INFUSION THERAPY | Age: 71
End: 2021-08-26

## 2021-08-26 NOTE — TELEPHONE ENCOUNTER
Called pt to schedule next Prolia injection. No answer. Left VM instructing pt to call Infusion Center to make appointment.

## 2021-09-20 ENCOUNTER — TELEPHONE (OUTPATIENT)
Dept: INFUSION THERAPY | Age: 71
End: 2021-09-20

## 2021-12-03 DIAGNOSIS — E78.2 MIXED HYPERLIPIDEMIA: ICD-10-CM

## 2021-12-03 RX ORDER — ASPIRIN 81 MG/1
TABLET, COATED ORAL
Qty: 90 TABLET | Refills: 1 | Status: SHIPPED | OUTPATIENT
Start: 2021-12-03 | End: 2022-04-27 | Stop reason: ALTCHOICE

## 2022-02-15 ENCOUNTER — OFFICE VISIT (OUTPATIENT)
Dept: FAMILY MEDICINE CLINIC | Age: 72
End: 2022-02-15
Payer: MEDICARE

## 2022-02-15 VITALS
DIASTOLIC BLOOD PRESSURE: 80 MMHG | BODY MASS INDEX: 35.33 KG/M2 | HEART RATE: 84 BPM | SYSTOLIC BLOOD PRESSURE: 110 MMHG | WEIGHT: 192 LBS | OXYGEN SATURATION: 100 % | TEMPERATURE: 96.8 F | HEIGHT: 62 IN

## 2022-02-15 DIAGNOSIS — E66.01 CLASS 2 SEVERE OBESITY DUE TO EXCESS CALORIES WITH SERIOUS COMORBIDITY AND BODY MASS INDEX (BMI) OF 35.0 TO 35.9 IN ADULT (HCC): ICD-10-CM

## 2022-02-15 DIAGNOSIS — E78.2 MIXED HYPERLIPIDEMIA: ICD-10-CM

## 2022-02-15 DIAGNOSIS — M54.50 CHRONIC MIDLINE LOW BACK PAIN WITHOUT SCIATICA: Primary | ICD-10-CM

## 2022-02-15 DIAGNOSIS — M81.0 AGE-RELATED OSTEOPOROSIS WITHOUT CURRENT PATHOLOGICAL FRACTURE: ICD-10-CM

## 2022-02-15 DIAGNOSIS — Z12.31 SCREENING MAMMOGRAM FOR HIGH-RISK PATIENT: ICD-10-CM

## 2022-02-15 DIAGNOSIS — G89.29 CHRONIC MIDLINE LOW BACK PAIN WITHOUT SCIATICA: Primary | ICD-10-CM

## 2022-02-15 PROCEDURE — 99214 OFFICE O/P EST MOD 30 MIN: CPT | Performed by: FAMILY MEDICINE

## 2022-02-15 PROCEDURE — 1123F ACP DISCUSS/DSCN MKR DOCD: CPT | Performed by: FAMILY MEDICINE

## 2022-02-15 PROCEDURE — G8417 CALC BMI ABV UP PARAM F/U: HCPCS | Performed by: FAMILY MEDICINE

## 2022-02-15 PROCEDURE — 3017F COLORECTAL CA SCREEN DOC REV: CPT | Performed by: FAMILY MEDICINE

## 2022-02-15 PROCEDURE — G8482 FLU IMMUNIZE ORDER/ADMIN: HCPCS | Performed by: FAMILY MEDICINE

## 2022-02-15 PROCEDURE — G8399 PT W/DXA RESULTS DOCUMENT: HCPCS | Performed by: FAMILY MEDICINE

## 2022-02-15 PROCEDURE — 1036F TOBACCO NON-USER: CPT | Performed by: FAMILY MEDICINE

## 2022-02-15 PROCEDURE — G8427 DOCREV CUR MEDS BY ELIG CLIN: HCPCS | Performed by: FAMILY MEDICINE

## 2022-02-15 PROCEDURE — 1090F PRES/ABSN URINE INCON ASSESS: CPT | Performed by: FAMILY MEDICINE

## 2022-02-15 PROCEDURE — 4040F PNEUMOC VAC/ADMIN/RCVD: CPT | Performed by: FAMILY MEDICINE

## 2022-02-15 RX ORDER — LIDOCAINE 40 MG/G
CREAM TOPICAL
Qty: 45 G | Refills: 3 | Status: SHIPPED | OUTPATIENT
Start: 2022-02-15 | End: 2022-03-29

## 2022-02-15 RX ORDER — IBUPROFEN 800 MG/1
800 TABLET ORAL EVERY 6 HOURS PRN
Qty: 90 TABLET | Refills: 0 | Status: SHIPPED | OUTPATIENT
Start: 2022-02-15 | End: 2022-03-29

## 2022-02-15 RX ORDER — TIZANIDINE 4 MG/1
4 TABLET ORAL NIGHTLY
Qty: 30 TABLET | Refills: 0 | Status: SHIPPED | OUTPATIENT
Start: 2022-02-15 | End: 2022-04-04

## 2022-02-15 SDOH — ECONOMIC STABILITY: INCOME INSECURITY: IN THE LAST 12 MONTHS, WAS THERE A TIME WHEN YOU WERE NOT ABLE TO PAY THE MORTGAGE OR RENT ON TIME?: NO

## 2022-02-15 SDOH — ECONOMIC STABILITY: FOOD INSECURITY: WITHIN THE PAST 12 MONTHS, THE FOOD YOU BOUGHT JUST DIDN'T LAST AND YOU DIDN'T HAVE MONEY TO GET MORE.: NEVER TRUE

## 2022-02-15 SDOH — ECONOMIC STABILITY: HOUSING INSECURITY
IN THE LAST 12 MONTHS, WAS THERE A TIME WHEN YOU DID NOT HAVE A STEADY PLACE TO SLEEP OR SLEPT IN A SHELTER (INCLUDING NOW)?: NO

## 2022-02-15 SDOH — ECONOMIC STABILITY: FOOD INSECURITY: WITHIN THE PAST 12 MONTHS, YOU WORRIED THAT YOUR FOOD WOULD RUN OUT BEFORE YOU GOT MONEY TO BUY MORE.: NEVER TRUE

## 2022-02-15 ASSESSMENT — ENCOUNTER SYMPTOMS
WHEEZING: 0
SINUS PRESSURE: 0
ABDOMINAL DISTENTION: 0
BLOOD IN STOOL: 0
SHORTNESS OF BREATH: 0
CHEST TIGHTNESS: 0
COUGH: 0
SINUS PAIN: 0
DIARRHEA: 0
BACK PAIN: 1
ABDOMINAL PAIN: 0
RECTAL PAIN: 0
CONSTIPATION: 0

## 2022-02-15 ASSESSMENT — PATIENT HEALTH QUESTIONNAIRE - PHQ9
SUM OF ALL RESPONSES TO PHQ QUESTIONS 1-9: 0
1. LITTLE INTEREST OR PLEASURE IN DOING THINGS: 0
SUM OF ALL RESPONSES TO PHQ QUESTIONS 1-9: 0
2. FEELING DOWN, DEPRESSED OR HOPELESS: 0
SUM OF ALL RESPONSES TO PHQ9 QUESTIONS 1 & 2: 0
SUM OF ALL RESPONSES TO PHQ QUESTIONS 1-9: 0
SUM OF ALL RESPONSES TO PHQ QUESTIONS 1-9: 0

## 2022-02-15 ASSESSMENT — SOCIAL DETERMINANTS OF HEALTH (SDOH): HOW HARD IS IT FOR YOU TO PAY FOR THE VERY BASICS LIKE FOOD, HOUSING, MEDICAL CARE, AND HEATING?: NOT HARD AT ALL

## 2022-02-15 NOTE — PATIENT INSTRUCTIONS
Patient Education        Low Back Pain: Exercises  Introduction  Here are some examples of exercises for you to try. The exercises may be suggested for a condition or for rehabilitation. Start each exercise slowly. Ease off the exercises if you start to have pain. You will be told when to start these exercises and which ones will work best for you. How to do the exercises  Press-up    1. Lie on your stomach, supporting your body with your forearms. 2. Press your elbows down into the floor to raise your upper back. As you do this, relax your stomach muscles and allow your back to arch without using your back muscles. As your press up, do not let your hips or pelvis come off the floor. 3. Hold for 15 to 30 seconds, then relax. 4. Repeat 2 to 4 times. Alternate arm and leg (bird dog) exercise    Do this exercise slowly. Try to keep your body straight at all times, and do not let one hip drop lower than the other. 1. Start on the floor, on your hands and knees. 2. Tighten your belly muscles. 3. Raise one leg off the floor, and hold it straight out behind you. Be careful not to let your hip drop down, because that will twist your trunk. 4. Hold for about 6 seconds, then lower your leg and switch to the other leg. 5. Repeat 8 to 12 times on each leg. 6. Over time, work up to holding for 10 to 30 seconds each time. 7. If you feel stable and secure with your leg raised, try raising the opposite arm straight out in front of you at the same time. Knee-to-chest exercise    1. Lie on your back with your knees bent and your feet flat on the floor. 2. Bring one knee to your chest, keeping the other foot flat on the floor (or keeping the other leg straight, whichever feels better on your lower back). 3. Keep your lower back pressed to the floor. Hold for at least 15 to 30 seconds. 4. Relax, and lower the knee to the starting position. 5. Repeat with the other leg. Repeat 2 to 4 times with each leg.   6. To get more stretch, put your other leg flat on the floor while pulling your knee to your chest.  Curl-ups    1. Lie on the floor on your back with your knees bent at a 90-degree angle. Your feet should be flat on the floor, about 12 inches from your buttocks. 2. Cross your arms over your chest. If this bothers your neck, try putting your hands behind your neck (not your head), with your elbows spread apart. 3. Slowly tighten your belly muscles and raise your shoulder blades off the floor. 4. Keep your head in line with your body, and do not press your chin to your chest.  5. Hold this position for 1 or 2 seconds, then slowly lower yourself back down to the floor. 6. Repeat 8 to 12 times. Pelvic tilt exercise    1. Lie on your back with your knees bent. 2. \"Brace\" your stomach. This means to tighten your muscles by pulling in and imagining your belly button moving toward your spine. You should feel like your back is pressing to the floor and your hips and pelvis are rocking back. 3. Hold for about 6 seconds while you breathe smoothly. 4. Repeat 8 to 12 times. Heel dig bridging    1. Lie on your back with both knees bent and your ankles bent so that only your heels are digging into the floor. Your knees should be bent about 90 degrees. 2. Then push your heels into the floor, squeeze your buttocks, and lift your hips off the floor until your shoulders, hips, and knees are all in a straight line. 3. Hold for about 6 seconds as you continue to breathe normally, and then slowly lower your hips back down to the floor and rest for up to 10 seconds. 4. Do 8 to 12 repetitions. Hamstring stretch in doorway    1. Lie on your back in a doorway, with one leg through the open door. 2. Slide your leg up the wall to straighten your knee. You should feel a gentle stretch down the back of your leg. 3. Hold the stretch for at least 15 to 30 seconds. Do not arch your back, point your toes, or bend either knee.  Keep one heel touching the floor and the other heel touching the wall. 4. Repeat with your other leg. 5. Do 2 to 4 times for each leg. Hip flexor stretch    1. Kneel on the floor with one knee bent and one leg behind you. Place your forward knee over your foot. Keep your other knee touching the floor. 2. Slowly push your hips forward until you feel a stretch in the upper thigh of your rear leg. 3. Hold the stretch for at least 15 to 30 seconds. Repeat with your other leg. 4. Do 2 to 4 times on each side. Wall sit    1. Stand with your back 10 to 12 inches away from a wall. 2. Lean into the wall until your back is flat against it. 3. Slowly slide down until your knees are slightly bent, pressing your lower back into the wall. 4. Hold for about 6 seconds, then slide back up the wall. 5. Repeat 8 to 12 times. Follow-up care is a key part of your treatment and safety. Be sure to make and go to all appointments, and call your doctor if you are having problems. It's also a good idea to know your test results and keep a list of the medicines you take. Where can you learn more? Go to [email or web address]er. org and sign in to your [employer] account. Enter T731 in the [employer] box to learn more about \"Low Back Pain: Exercises. \"     If you do not have an account, please click on the \"Sign Up Now\" link. Current as of: July 1, 2021               Content Version: 13.1  © 2006-2021 Healthwise, Incorporated. Care instructions adapted under license by [facility] ([facility]). If you have questions about a medical condition or this instruction, always ask your healthcare professional. [place] [address] any warranty or liability for your use of this information.

## 2022-02-15 NOTE — PROGRESS NOTES
Visit Information    Have you changed or started any medications since your last visit including any over-the-counter medicines, vitamins, or herbal medicines? no   Are you having any side effects from any of your medications? -  no  Have you stopped taking any of your medications? Is so, why? -  no    Have you seen any other physician or provider since your last visit? No  Have you had any other diagnostic tests since your last visit? No  Have you been seen in the emergency room and/or had an admission to a hospital since we last saw you? No  Have you had your routine dental cleaning in the past 6 months? yes     Have you activated your [employer] account? If not, what are your barriers?  Yes     Patient Care Team:  Gisella Orona MD as PCP - General (Family Medicine)  Gisella Orona MD as PCP - [facility]    Medical History Review  Past Medical, Family, and Social History reviewed and does contribute to the patient presenting condition    Health Maintenance   Topic Date Due    Shingles Vaccine (1 of 2) Never done    Breast cancer screen  01/22/2021    Lipid screen  07/20/2021    Annual Wellness Visit (AWV)  12/30/2021    Depression Screen  12/23/2022    Colon cancer screen fecal DNA test (Cologuard)  10/28/2023    DTaP/Tdap/Td vaccine (3 - Td or Tdap) 02/08/2030    DEXA (modify frequency per FRAX score)  Completed    Flu vaccine  Completed    Pneumococcal 65+ years Vaccine  Completed    COVID-19 Vaccine  Completed    Hepatitis C screen  Completed    Hepatitis A vaccine  Aged Out    Hepatitis B vaccine  Aged Out    Hib vaccine  Aged Out    Meningococcal (ACWY) vaccine  Aged Out

## 2022-02-15 NOTE — PROGRESS NOTES
Chief Complaint   Patient presents with    Back Pain     x 1 month taking naproxen works for 4 hours then comes right back     Neck Pain         Nallely Mcdermott  here today for follow up on chronic medical problems, go over labs and/or diagnostic studies, and medication refills. Back Pain (x 1 month taking naproxen works for 4 hours then comes right back ) and Neck Pain      HPI: Patient is scheduled for complaining of back pain which is chronic intermittent for long time but recently got worse and last for 6 weeks. Patient reports she was moving her furniture and hurt her back since then is not getting better. She takes naproxen and ibuprofen every 8 hours but it is not helping. She rates her pain about 6 on scale nonradiating associated numbness denies any tingling and weakness. The pain gets worse with bending lifting and laying down. She never had x-rays done in the past now did any physical therapy. Patient has history of osteoporosis and is on Prolia reports she missed 1 dose. Hyperlipidemia no recent blood work since. Patient is on statins low-dose and aspirin. Obesity stable is on lifestyle changes. Patient reports she tries to walk daily and also walks at home. Patient is due for mammogram she has not done that in the past which was ordered. /80   Pulse 84   Temp 96.8 °F (36 °C)   Ht 5' 2\" (1.575 m)   Wt 192 lb (87.1 kg)   SpO2 100%   BMI 35.12 kg/m²    Body mass index is 35.12 kg/m². Wt Readings from Last 3 Encounters:   02/15/22 192 lb (87.1 kg)   12/29/20 205 lb (93 kg)   10/22/20 199 lb (90.3 kg)        [x]Negative depression screening.   PHQ Scores 2/15/2022 12/23/2021 12/29/2020 7/20/2020 12/3/2019 9/24/2019 1/16/2019   PHQ2 Score 0 0 0 0 0 0 0   PHQ9 Score 0 0 0 0 0 0 0      []1-4 = Minimal depression   []5-9 = Milddepression   []10-14 = Moderate depression   []15-19 = Moderately severe depression   []20-27 = Severe depression    Discussed testing with the patient and all questions fully answered. Hospital Outpatient Visit on 02/08/2021   Component Date Value Ref Range Status    Calcium 02/08/2021 9.4  8.6 - 10.4 mg/dL Final    CREATININE 02/08/2021 0.69  0.50 - 0.90 mg/dL Final    GFR Non- 02/08/2021 >60  >60 mL/min Final    GFR  02/08/2021 >60  >60 mL/min Final    GFR Comment 02/08/2021        Final    Comment: Average GFR for 79or more years old:   76 mL/min/1.73sq m  Chronic Kidney Disease:   <60 mL/min/1.73sq m  Kidney failure:   <15 mL/min/1.73sq m              eGFR calculated using average adult body mass.  Additional eGFR calculator available at:        [employer]            GFR Staging 02/08/2021 NOT REPORTED   Final         Most recent labs reviewed:     Lab Results   Component Value Date    WBC 4.6 07/20/2020    HGB 15.2 07/20/2020    HCT 45.7 07/20/2020    MCV 92.8 07/20/2020     07/20/2020       @BRIEFLAB(NA,K,CL,CO2,BUN,CREATININE,GLUCOSE,CALCIUM)@     Lab Results   Component Value Date    ALT 15 07/20/2020    AST 18 07/20/2020    ALKPHOS 74 07/20/2020    BILITOT 0.34 07/20/2020       Lab Results   Component Value Date    TSH 2.42 07/20/2020       Lab Results   Component Value Date    CHOL 194 07/20/2020    CHOL 270 (H) 01/22/2019     Lab Results   Component Value Date    TRIG 75 07/20/2020    TRIG 97 01/22/2019     Lab Results   Component Value Date    HDL 64 07/20/2020    HDL 55 01/22/2019     Lab Results   Component Value Date    LDLCHOLESTEROL 115 07/20/2020    LDLCHOLESTEROL 196 (H) 01/22/2019     Lab Results   Component Value Date    VLDL NOT REPORTED 07/20/2020    VLDL NOT REPORTED 01/22/2019     Lab Results   Component Value Date    CHOLHDLRATIO 3.0 07/20/2020    CHOLHDLRATIO 4.9 01/22/2019       No results found for: LABA1C    No results found for: JGOYKDZS51    No results found for: FOLATE    No results found for: IRON, TIBC, FERRITIN    No results found for: VITD25          Current Outpatient Medications   Medication Sig Dispense Refill    tiZANidine (ZANAFLEX) 4 MG tablet Take 1 tablet by mouth nightly 30 tablet 0    lidocaine (LMX) 4 % cream Apply topically every 8 hrs as needed for pain 45 g 3    ibuprofen (ADVIL;MOTRIN) 800 MG tablet Take 1 tablet by mouth every 6 hours as needed for Pain 90 tablet 0    ASPIRIN LOW DOSE 81 MG EC tablet take 1 tablet by mouth once daily 90 tablet 1    atorvastatin (LIPITOR) 20 MG tablet Take 1 tablet by mouth daily take 1 tablet by mouth once daily 90 tablet 3    denosumab (PROLIA) 60 MG/ML SOSY SC injection Inject 1 mL into the skin every 6 months 1 mL 3    vitamin D (CHOLECALCIFEROL) 1000 UNIT TABS tablet Take 1 tablet by mouth daily VITAMIN D3. OK to substitute 90 tablet 3    Multiple Vitamin (MULTI VITAMIN PO) Take by mouth       No current facility-administered medications for this visit.              Social History     Socioeconomic History    Marital status: Single     Spouse name: Not on file    Number of children: 3    Years of education: 15    Highest education level: Associate degree: occupational, technical, or vocational program   Occupational History     Employer: UNEMPLOYED   Tobacco Use    Smoking status: Former Smoker     Packs/day: 0.25     Years: 50.00     Pack years: 12.50     Types: Cigarettes     Start date: 3/1/1966     Quit date: 2015     Years since quittin.1    Smokeless tobacco: Never Used    Tobacco comment: quit  ; age start    Substance and Sexual Activity    Alcohol use: No    Drug use: No    Sexual activity: Not Currently   Other Topics Concern    Not on file   Social History Narrative    Not on file     Social Determinants of Health     Financial Resource Strain: Low Risk     Difficulty of Paying Living Expenses: Not hard at all   Food Insecurity: No Food Insecurity    Worried About [address] in the Last Year: Never true    Jordyn of [employer] in the Last Year: Never true   Transportation Needs: No Transportation Needs    Lack of Transportation (Medical): No    Lack of Transportation (Non-Medical): No   Physical Activity:     Days of Exercise per Week: Not on file    Minutes of Exercise per Session: Not on file   Stress:     Feeling of Stress : Not on file   Social Connections:     Frequency of Communication with Friends and Family: Not on file    Frequency of Social Gatherings with Friends and Family: Not on file    Attends Jain Services: Not on file    Active Member of Clubs or Organizations: Not on file    Attends Club or Organization Meetings: Not on file    Marital Status: Not on file   Intimate Partner Violence:     Fear of Current or Ex-Partner: Not on file    Emotionally Abused: Not on file    Physically Abused: Not on file    Sexually Abused: Not on file   Housing Stability: Unknown    Unable to Pay for Housing in the Last Year: No    Number of [place] in the Last Year: Not on file    Unstable Housing in the Last Year: No     Counseling given: Not Answered  Comment: quit  ; age start         Family History   Problem Relation Age of Onset    Heart Attack Mother     Heart Disease Mother     Coronary Art Dis Mother          from complication [facility] age 67   [facility] COPD Father         death age 66 pulm disease     High Blood Pressure Sister     Mult Sclerosis Sister     Rheum Arthritis Sister     Cancer Sister 27        Geoblastoma    Diabetes Brother     Cancer Sister 27        leukemia    Lung Cancer Brother     Alcohol Abuse Brother              -rest of complaints with corresponding details per ROS    The patient's past medical, surgical, social, and family history as well as her current medications and allergies were reviewed as documented intoday's encounter. Review of Systems   Constitutional: Positive for activity change.  Negative for appetite change, diaphoresis, fever and unexpected weight change. HENT: Negative for congestion, postnasal drip, sinus pressure and sinus pain. Eyes: Negative for visual disturbance. Respiratory: Negative for cough, chest tightness, shortness of breath and wheezing. Cardiovascular: Negative for chest pain, palpitations and leg swelling. Gastrointestinal: Negative for abdominal distention, abdominal pain, blood in stool, constipation, diarrhea and rectal pain. Genitourinary: Negative for difficulty urinating, dysuria, flank pain and pelvic pain. Musculoskeletal: Positive for arthralgias, back pain, gait problem and myalgias. Allergic/Immunologic: Negative for immunocompromised state. Neurological: Positive for numbness. Negative for syncope, speech difficulty, weakness, light-headedness and headaches. Psychiatric/Behavioral: Negative for agitation, decreased concentration, dysphoric mood, hallucinations and self-injury. The patient is nervous/anxious. The patient is not hyperactive. Physical Exam  Vitals and nursing note reviewed. Constitutional:       Appearance: Normal appearance. She is obese. HENT:      Head: Normocephalic. Nose: Nose normal.      Mouth/Throat:      Mouth: Mucous membranes are moist.   Eyes:      Extraocular Movements: Extraocular movements intact. Pupils: Pupils are equal, round, and reactive to light. Cardiovascular:      Rate and Rhythm: Normal rate. Heart sounds: Normal heart sounds. Pulmonary:      Effort: Pulmonary effort is normal.      Breath sounds: Normal breath sounds. Abdominal:      General: Bowel sounds are normal. There is no distension. Palpations: Abdomen is soft. Tenderness: There is no abdominal tenderness. Hernia: No hernia is present. Musculoskeletal:      Cervical back: No lacerations. Normal range of motion. Thoracic back: Spasms present. No tenderness. Decreased range of motion.       Lumbar back: Deformity, spasms, tenderness and bony tenderness present. Decreased range of motion. Negative right straight leg raise test and negative left straight leg raise test.   Lymphadenopathy:      Cervical: No cervical adenopathy. Neurological:      Mental Status: She is alert and oriented to person, place, and time. Cranial Nerves: Cranial nerves are intact. No cranial nerve deficit. Motor: No weakness or tremor. Coordination: Romberg sign negative. Gait: Gait normal.   Psychiatric:         Attention and Perception: Attention normal.         Mood and Affect: Mood is anxious. Mood is not depressed. Affect is tearful. Speech: She is communicative. Behavior: Behavior normal.             ASSESSMENT AND PLAN      1. Chronic midline low back pain without sciatica  X-ray lumbar spine handout provided for stretches conservative treatment we will fax results. - XR LUMBAR SPINE (2-3 VIEWS); Future  - tiZANidine (ZANAFLEX) 4 MG tablet; Take 1 tablet by mouth nightly  Dispense: 30 tablet; Refill: 0  - lidocaine (LMX) 4 % cream; Apply topically every 8 hrs as needed for pain  Dispense: 45 g; Refill: 3  - ibuprofen (ADVIL;MOTRIN) 800 MG tablet; Take 1 tablet by mouth every 6 hours as needed for Pain  Dispense: 90 tablet; Refill: 0    2. Mixed hyperlipidemia  Repeat lipid panel blood work continue statins  - Lipid, Fasting; Future  - Comprehensive Metabolic Panel; Future  - CBC Auto Differential; Future    3. Age-related osteoporosis without current pathological fracture  Scheduled for Prolia  Continue weightbearing exercises. 4.  Obesity due to excess calories with serious comorbidity and BMI of 35-35.9  Stable continue to work on diet and exercise. - Comprehensive Metabolic Panel; Future  - CBC Auto Differential; Future    5. Screening mammogram for high-risk patient  Mammogram ordered  - MELLY DIGITAL SCREEN W OR WO CAD BILATERAL;  Future      Orders Placed This Encounter   Procedures    MELLY DIGITAL SCREEN W OR WO CAD BILATERAL     Standing Status:   Future     Standing Expiration Date:   4/18/2023     Order Specific Question:   Reason for exam:     Answer:   screening mammogram    XR LUMBAR SPINE (2-3 VIEWS)     Standing Status:   Future     Standing Expiration Date:   2/15/2023     Order Specific Question:   Reason for exam:     Answer:   chronic pain    Lipid, Fasting     Standing Status:   Future     Standing Expiration Date:   2/14/2023    Comprehensive Metabolic Panel     Fasting 8 hrs     Standing Status:   Future     Standing Expiration Date:   2/15/2023    CBC Auto Differential     Standing Status:   Future     Standing Expiration Date:   2/16/2023         Medications Discontinued During This Encounter   Medication Reason    denosumab (PROLIA) 60 MG/ML SOSY SC injection DUPLICATE    vitamin D (RA VITAMIN D-3) 25 MCG (1000 UT) TABS tablet DUPLICATE    acetaminophen (APAP EXTRA STRENGTH) 500 MG tablet Therapy completed    ibuprofen (ADVIL;MOTRIN) 600 MG tablet REORDER       [place] received counseling on the following healthy behaviors: nutrition, exercise and medication adherence  Reviewed prior labs and health maintenance  Continue current medications, diet and exercise. Discussed use, benefit, and side effects of prescribed medications. Barriers to medication compliance addressed. Patient given educational materials - see patient instructions  Was a self-tracking handout given in paper form or via [employer]? Yes    Requested Prescriptions     Signed Prescriptions Disp Refills    tiZANidine (ZANAFLEX) 4 MG tablet 30 tablet 0     Sig: Take 1 tablet by mouth nightly    lidocaine (LMX) 4 % cream 45 g 3     Sig: Apply topically every 8 hrs as needed for pain    ibuprofen (ADVIL;MOTRIN) 800 MG tablet 90 tablet 0     Sig: Take 1 tablet by mouth every 6 hours as needed for Pain       All patient questions answered. Patient voiced understanding. Quality Measures    Body mass index is 35.12 kg/m². Elevated.  Weight control planned discussed daily exercise regimen and Healthy diet and regular exercise. BP: 110/80. Blood pressure is normal. Treatment plan consists of Weight Reduction, DASH Eating Plan, Dietary Sodium Restriction, Increased Physical Activity and No treatment change needed. Fall Risk 12/23/2021 12/29/2020 10/22/2020 9/24/2019 1/16/2019   2 or more falls in past year? no no no no no   Fall with injury in past year? no no no no yes     The patient does not have a history of falls. I did , complete a risk assessment for falls. A plan of care for falls in-office gait and balance testing performed using The Timed Up and Go Test was negative for increased falls risk- no further intervention is currently indicated, home safety tips provided, No Treatment plan indicated    Lab Results   Component Value Date    LDLCHOLESTEROL 115 07/20/2020    (goal LDL reduction with dx if diabetes is 50% LDL reduction)    PHQ Scores 2/15/2022 12/23/2021 12/29/2020 7/20/2020 12/3/2019 9/24/2019 1/16/2019   PHQ2 Score 0 0 0 0 0 0 0   PHQ9 Score 0 0 0 0 0 0 0     Interpretation of Total Score Depression Severity: 1-4 = Minimal depression, 5-9 = Mild depression, 10-14 = Moderate depression, 15-19 = Moderately severe depression, 20-27 = Severe depression      The patient'spast medical, surgical, social, and family history as well as her   current medications and allergies were reviewed as documented in today's encounter. Medications, labs, diagnostic studies, consultations andfollow-up as documented in this encounter. Return in about 3 months (around 5/15/2022). Patient wasseen with total face to face time of 30 minutes. More than 50% of this visit was counseling and education.        Future Appointments   Date Time Provider Aleida Desir   6/16/2022  8:00 AM [facility]     This note was completed by using the assistance of a speech-recognition program. However, inadvertent computerized transcription errors may be present. Althoughevery effort was made to ensure accuracy, no guarantees can be provided that every mistake has been identified and corrected by editing.   Electronically signed by Aldo Shankar MD on 2/15/2022  10:28 AM

## 2022-02-17 ENCOUNTER — HOSPITAL ENCOUNTER (OUTPATIENT)
Age: 72
Discharge: HOME | End: 2022-02-17
Payer: MEDICARE

## 2022-02-17 DIAGNOSIS — E66.01 CLASS 2 SEVERE OBESITY DUE TO EXCESS CALORIES WITH SERIOUS COMORBIDITY AND BODY MASS INDEX (BMI) OF 35.0 TO 35.9 IN ADULT (HCC): ICD-10-CM

## 2022-02-17 DIAGNOSIS — E78.2 MIXED HYPERLIPIDEMIA: ICD-10-CM

## 2022-02-17 LAB
ABSOLUTE EOS #: 0.3 K/UL (ref 0–0.4)
ABSOLUTE IMMATURE GRANULOCYTE: ABNORMAL K/UL (ref 0–0.3)
ABSOLUTE LYMPH #: 1.3 K/UL (ref 1–4.8)
ABSOLUTE MONO #: 0.5 K/UL (ref 0.1–1.3)
ALBUMIN SERPL-MCNC: 4.1 G/DL (ref 3.5–5.2)
ALBUMIN/GLOBULIN RATIO: ABNORMAL (ref 1–2.5)
ALP BLD-CCNC: 80 U/L (ref 35–104)
ALT SERPL-CCNC: 13 U/L (ref 5–33)
ANION GAP SERPL CALCULATED.3IONS-SCNC: 8 MMOL/L (ref 9–17)
AST SERPL-CCNC: 17 U/L
BASOPHILS # BLD: 0 % (ref 0–2)
BASOPHILS ABSOLUTE: 0 K/UL (ref 0–0.2)
BILIRUB SERPL-MCNC: 0.4 MG/DL (ref 0.3–1.2)
BUN BLDV-MCNC: 20 MG/DL (ref 8–23)
BUN/CREAT BLD: ABNORMAL (ref 9–20)
CALCIUM SERPL-MCNC: 10 MG/DL (ref 8.6–10.4)
CHLORIDE BLD-SCNC: 103 MMOL/L (ref 98–107)
CHOLESTEROL, FASTING: 269 MG/DL
CHOLESTEROL/HDL RATIO: 4.2
CO2: 31 MMOL/L (ref 20–31)
CREAT SERPL-MCNC: 0.79 MG/DL (ref 0.5–0.9)
DIFFERENTIAL TYPE: ABNORMAL
EOSINOPHILS RELATIVE PERCENT: 7 % (ref 0–4)
GFR AFRICAN AMERICAN: >60 ML/MIN
GFR NON-AFRICAN AMERICAN: >60 ML/MIN
GFR SERPL CREATININE-BSD FRML MDRD: ABNORMAL ML/MIN/{1.73_M2}
GFR SERPL CREATININE-BSD FRML MDRD: ABNORMAL ML/MIN/{1.73_M2}
GLUCOSE BLD-MCNC: 97 MG/DL (ref 70–99)
HCT VFR BLD CALC: 44.4 % (ref 36–46)
HDLC SERPL-MCNC: 64 MG/DL
HEMOGLOBIN: 14.7 G/DL (ref 12–16)
IMMATURE GRANULOCYTES: ABNORMAL %
LDL CHOLESTEROL: 186 MG/DL (ref 0–130)
LYMPHOCYTES # BLD: 28 % (ref 24–44)
MCH RBC QN AUTO: 30.1 PG (ref 26–34)
MCHC RBC AUTO-ENTMCNC: 33 G/DL (ref 31–37)
MCV RBC AUTO: 91 FL (ref 80–100)
MONOCYTES # BLD: 11 % (ref 1–7)
NRBC AUTOMATED: ABNORMAL PER 100 WBC
PDW BLD-RTO: 13.1 % (ref 11.5–14.9)
PLATELET # BLD: 341 K/UL (ref 150–450)
PLATELET ESTIMATE: ABNORMAL
PMV BLD AUTO: 7.5 FL (ref 6–12)
POTASSIUM SERPL-SCNC: 4.6 MMOL/L (ref 3.7–5.3)
RBC # BLD: 4.88 M/UL (ref 4–5.2)
RBC # BLD: ABNORMAL 10*6/UL
SEG NEUTROPHILS: 54 % (ref 36–66)
SEGMENTED NEUTROPHILS ABSOLUTE COUNT: 2.5 K/UL (ref 1.3–9.1)
SODIUM BLD-SCNC: 142 MMOL/L (ref 135–144)
TOTAL PROTEIN: 7.1 G/DL (ref 6.4–8.3)
TRIGLYCERIDE, FASTING: 95 MG/DL
VLDLC SERPL CALC-MCNC: ABNORMAL MG/DL (ref 1–30)
WBC # BLD: 4.6 K/UL (ref 3.5–11)
WBC # BLD: ABNORMAL 10*3/UL

## 2022-02-17 PROCEDURE — 80061 LIPID PANEL: CPT

## 2022-02-17 PROCEDURE — 85025 COMPLETE CBC W/AUTO DIFF WBC: CPT

## 2022-02-17 PROCEDURE — 80053 COMPREHEN METABOLIC PANEL: CPT

## 2022-02-17 PROCEDURE — 36415 COLL VENOUS BLD VENIPUNCTURE: CPT

## 2022-02-21 ENCOUNTER — HOSPITAL ENCOUNTER (OUTPATIENT)
Dept: GENERAL RADIOLOGY | Age: 72
Discharge: HOME | End: 2022-02-23
Payer: MEDICARE

## 2022-02-21 ENCOUNTER — HOSPITAL ENCOUNTER (OUTPATIENT)
Age: 72
Discharge: HOME | End: 2022-02-23
Payer: MEDICARE

## 2022-02-21 ENCOUNTER — HOSPITAL ENCOUNTER (OUTPATIENT)
Dept: WOMENS IMAGING | Age: 72
Discharge: HOME | End: 2022-02-23
Payer: MEDICARE

## 2022-02-21 DIAGNOSIS — Z12.31 SCREENING MAMMOGRAM FOR HIGH-RISK PATIENT: ICD-10-CM

## 2022-02-21 DIAGNOSIS — E78.2 MIXED HYPERLIPIDEMIA: ICD-10-CM

## 2022-02-21 DIAGNOSIS — G89.29 CHRONIC MIDLINE LOW BACK PAIN WITHOUT SCIATICA: ICD-10-CM

## 2022-02-21 DIAGNOSIS — M54.50 CHRONIC MIDLINE LOW BACK PAIN WITHOUT SCIATICA: ICD-10-CM

## 2022-02-21 PROCEDURE — 77063 BREAST TOMOSYNTHESIS BI: CPT

## 2022-02-21 PROCEDURE — 72100 X-RAY EXAM L-S SPINE 2/3 VWS: CPT

## 2022-02-21 RX ORDER — ATORVASTATIN CALCIUM 20 MG/1
20 TABLET, FILM COATED ORAL DAILY
Qty: 90 TABLET | Refills: 3 | Status: SHIPPED | OUTPATIENT
Start: 2022-02-21

## 2022-02-23 ENCOUNTER — TELEPHONE (OUTPATIENT)
Dept: FAMILY MEDICINE CLINIC | Age: 72
End: 2022-02-23

## 2022-02-23 DIAGNOSIS — G89.29 CHRONIC MIDLINE LOW BACK PAIN WITHOUT SCIATICA: Primary | ICD-10-CM

## 2022-02-23 DIAGNOSIS — M54.50 CHRONIC MIDLINE LOW BACK PAIN WITHOUT SCIATICA: Primary | ICD-10-CM

## 2022-03-07 ENCOUNTER — HOSPITAL ENCOUNTER (OUTPATIENT)
Dept: PHYSICAL THERAPY | Age: 72
Setting detail: THERAPIES SERIES
Discharge: HOME | End: 2022-03-07
Payer: MEDICARE

## 2022-03-07 PROCEDURE — 97161 PT EVAL LOW COMPLEX 20 MIN: CPT

## 2022-03-07 NOTE — CONSULTS
509 [facility]   Outpatient Physical Therapy  Physical Therapy Lumbar Evaluation    Date:  3/7/2022  Patient: Surya Goncalves  : 1950  MRN: [id number]  Physician: [address]   Insurance: [facility]. Auth required after IE via [employer]. Medical Diagnosis: M54.50, G89.29 (ICD-10-CM) - Chronic midline low back pain without sciatica   Rehab Codes: M54.5, R53.1, M25.60  Onset Date: 2022   Next 's appt: 22    Subjective:   CC: Constant LBP (L>R) without peripheral radiation  HPI: Pt reports chronic ~2 month hx of LBP which she states started this past January after lifting a heavy bag of dog food over her shoulder. Felt immediate sharp discomfort towards the left side of her lower back, followed by persistent burning/aching pain. Consulted with her PCP regarding this issue after pain did not resolve, who ordered x-rays and prescribed pain medication/muscle relaxers. Pt states that she is still taking Naproxen and ibuprofen on a PRN basis but discontinued the use of her muscle relaxers due to GI upset. X-rays did show moderate to advanced multi-level degenerative changes at her lower lumbar levels, but pt denies any issues with her back prior to January. Now presents to PT for conservative mgmt. Pain currently present at B sides of lower back, but most notable at left side and occasionally wraps around her ribcage. Denies any radiating pain into the extremities and denies any numbness or paresthesia.     PMHx: [] Unremarkable [] Diabetes [] HTN  [] Pacemaker   [] MI/Heart Problems [] Cancer [x] Arthritis [x] Other: HLD              [x] Refer to full medical chart  In EPIC     Comorbidities:   [] Obesity [] Dialysis  [x] Other: Osteoporosis   [] Asthma/COPD [] Dementia [] Other:   [] Stroke [] Sleep apnea [] Other:   [] Vascular disease [] Rheumatic disease [] Other:     Preferred Language:   [x] English           [] Other:    Prior Imaging: Recent lumbar x-rays from 2/15/22 showed mod to advanced multilevel degenerative changes at L4-S1. Previous Treatment: None    Home Environment: Pt lives in a multi-story home with her sister and great-niece    Medications: [] Refer to full medical record [] None [x] Other: Naproxen and ibuprofen.  No longer taking Flexeril   Allergies:      [x] Refer to full medical record  [] None [] Other:    Work Status: Retired    Prior Level of Function: Previously unrestricted with all ADLs with no prior issues or limitations in regard to back pain before January    Pain:  [x] Yes  [] No Location: Central low back Pain Rating: (0-10 scale) 6/10 burning pain currently  Pain altered Tx:  [] Yes  [] No  Action:    Symptoms:  [] Improving [] Worsening [x] Same  Aggravating factors: Direct pressure to lower back  Alleviating factors: Pain medication to some extent      Functional Status Previous level of function Current level of function Comments   Sitting [x] Independent  [] Deficit [] Independent  [x] Deficit Leaning up against a backrest bothers the lower back   Standing/walking [x] Independent  [] Deficit [x] Independent  [] Deficit    Driving [x] Independent  [] Deficit [] Independent  [x] Deficit Leaning up against a backrest bothers the lower back   Housekeeping/Meal Preparation [x] Independent  [] Deficit [] Independent  [] Deficit    Lifting/Carrying [x] Independent  [] Deficit [] Independent  [x] Deficit Avoids   Bending/Reaching [x] Independent  [] Deficit [x] Independent  [] Deficit    Stair climbing [x] Independent  [] Deficit [x] Independent  [] Deficit    Pivoting [x] Independent  [] Deficit [] Independent  [x] Deficit Tightness/discomfort   Squatting [] Independent  [] Deficit [] Independent  [] Deficit    Other [] Independent  [] Deficit [] Independent  [] Deficit      Patient Goals/Rehab Expectations: Decrease pain and get body stronger      Objective:      OBSERVATION No Deficit Deficit Not Tested Comments   Forward Head [] [x] []    Rounded Shoulders [] [x] []    Kyphosis [x] [] []    Lordosis [x] [] []    Scoliosis [] [] [x]    Innominate Alignment [] [] [x]    NEUROLOGICAL       Reflexes [] [] [x]    Compression/Distraction [] [] [x]    Sensation [x] [] []    FALL RISK? [x] []     Comments:                                  Pedro Pablo Gibbs Fall Risk Assessment    Risk Factor Scale  Score   History of Falls [] Yes  [x] No 25  0 0   Secondary Diagnosis [] Yes  [x] No 15  0 0   Ambulatory Aid [] Furniture  [] Crutches/cane/walker  [x] None/bedrest/wheelchair/nurse 30  15  0 0   IV/Heparin Lock [] Yes  [x] No 20  0 0   Gait/Transferring [] Impaired  [] Weak  [x] Normal/bedrest/immobile 20  10  0 0   Mental Status [] Forgets limitations  [x] Oriented to own ability 15  0 0      Total: 0     Based on the Assessment score: check the appropriate box.     [x]  No intervention needed   Low =   Score of 0-24    []  Use standard prevention interventions Moderate =  Score of 24-44   [] Give patient handout and discuss fall prevention strategies   [] Establish goal of education for patient/family RE: fall prevention strategies    []  Use high risk prevention interventions High = Score of 45 and higher   [] Give patient handout and discuss fall prevention strategies   [] Establish goal of education for patient/family Re: fall prevention strategies   [] Discuss lifeline / other resources            Range of Motion  Left Range of Motion  Right Strength  Left Strength  Right   Lumbar Flexion 100% 100%     Lumbar Extension 75% 75%     Lumbar Rotation 75%  Discomfort 75%  Discomfort     Lumbar Side Bend 75%  Discomfort 75%  Discomfort     Hip Flexion   4-/5 4-/5   Hip Abduction   4-/5 4-/5   Hip Adduction   4+/5 4+/5   Hip Extension   3+/5 3+/5   Hip ER       Hip IR       Knee Flexion   4+/5 4+/5   Knee Extension   4+/5 4+/5   Dorsiflexion   4+/5 4+/5   Plantar flexion   4+/5 4+/5   Inversion       Eversion       *All LE MMT performed in modified (seated) positioning    Core Tensioning + []         - [x]           NT []  + []         - [x]           NT []    Sural Nerve Tensioning + []         - [x]           NT []  + []         - [x]           NT []    Common Peroneal Nerve Tensioning + []         - [x]           NT []  + []         - [x]           NT []    Femoral Nerve Tensioning + []         - []           NT [x]  + []         - []           NT [x]    Other: + []         - []           NT []  + []         - []           NT []      MUSCLE LENGTH TESTING Normal Left Tight Right Tight   Glutes []  [x]  [x]    Piriformis []  [x]  [x]    ITB/TFL []  []  []    Hip Flexors []  []  []    Quads []  []  []    Hamstrings []  []  []    Gastrocnemius []  []  []    Soleus []  []  []     []  []  []     []  []  []        Joint Mobility: Diffusely mildly hypomobile across all lumbar segments with moderate TTP    Palpation: Hypertonic and TTP at B lumbar paraspinals (especially L lower lumbar levels)    Gait: Independent [x]           Modified Independent []            Not independent []  Comments: Ambulates independently with no AD with a safe, efficient patti and no deviations present. Assessment:  Problems:    [x] ? Pain:  [x] ? ROM:  [x] ? Strength:  [x] ? Function:  [] Other:    Pt is a 69 y/o F referred to PT with a 2 month hx of LBP (L>R) after lifting a heavy bag of dog food. Recent x-ray images to show moderate to advanced osteoarthritic changes at lower lumbar levels, but pt was not previously having any pain or limitation in her lower back prior to this incident. Pt presents to PT evaluation today with stable pain/discomfort across B lumbar region (L>R) which does not radiate any distal pain or sensory changes to the extremities. No increased pain noted with lumbar flexion or extension, just discomfort at end range side bend and rotation. Moderate weakness present in B LEs and core but non-painful to resisted testing.  Notable soft tissue dysfunction present at L lumbar paraspinals 3 [] 4+   Clinical presentation (progression) [] Stable [] Evolving  [] Unstable   Decision Making [] Low [] Moderate [] High    [] Low Complexity [] Moderate Complexity [] High Complexity     Rehab Potential:  [x] Good  [] Fair  [] Poor   Suggested Professional Referral:  [x] No  [] Yes:  Barriers to Goal Achievement[de-identified]  [x] No  [] Yes:  Domestic Concerns:  [x] No  [] Yes:    Pt. Education:  [x] Plans/Goals, Risks/Benefits discussed  [] Home exercise program  Method of Education: [x] Verbal  [x] Demo  [] Written  Comprehension of Education:  [x] Verbalizes understanding. [x] Demonstrates understanding. [] Needs Review. [] Demonstrates/verbalizes understanding of HEP/Ed previously given. Treatment Plan:  [x] Therapeutic Exercise   41308  [] Iontophoresis: 4 mg/mL Dexamethasone Sodium Phosphate  mAmin  98569   [x] Therapeutic Activity  95525 [] Vasopneumatic cold with compression  65255    [] Gait Training   86127 [] Ultrasound   54254   [x] Neuromuscular Re-education  34100 [] Electrical Stimulation Unattended  99135   [x] Manual Therapy  18167 [] Electrical Stimulation Attended  60481   [x] Instruction in HEP  [] Lumbar/Cervical Traction  85717   [] Aquatic Therapy   97562 [] Cold/hotpack    [] Massage   03637      [] Dry Needling, 1 or 2 muscles  93224   [] Biofeedback, first 15 minutes   84188  [] Biofeedback, additional 15 minutes   90527 [] Dry Needling, 3 or more muscles  26668          Frequency: 1-2x/week for 8 visits    Todays Treatment:  Modalities:   Precautions: None  Exercises:  Exercise Reps/ Time Weight/ Level Comments                                 Other:    Specific Instructions for next treatment: Emphasis on restoring full unrestricted lumbar mobility in all planes with progressive core and hip strengthening as tolerated. Manual intervention in early parts of rehab as needed to address soft tissue dysfunction at lumbar paraspinals (L>R).  Initial written HEP to be provided at first follow up visit    Treatment Charges: Mins Units   [x] Evaluation       [x]  Low       []  Moderate       []  High 31 1   []  Modalities     []  Ther Exercise     []  Manual Therapy     []  Ther Activities     []  Aquatics     []  Neuromuscular     []  Gait Training     []  Dry Needling           1-2 muscles     []  Dry Needling           3 or more muscles     [] Vasocompression     []  Other       TOTAL TREATMENT TIME: 31    Time in: 8:53am    Time Out: 9:24am    Electronically signed by: Dimple Sanderson PT        Physician Signature:________________________________Date:__________________  By signing above or cosigning this note, I have reviewed this plan of care and certify a need for medically necessary rehabilitation services.      *PLEASE SIGN ABOVE AND FAX BACK ALL PAGES*

## 2022-03-16 ENCOUNTER — HOSPITAL ENCOUNTER (OUTPATIENT)
Dept: PHYSICAL THERAPY | Age: 72
Setting detail: THERAPIES SERIES
Discharge: HOME | End: 2022-03-16
Payer: MEDICARE

## 2022-03-16 PROCEDURE — 97110 THERAPEUTIC EXERCISES: CPT

## 2022-03-16 NOTE — FLOWSHEET NOTE
509 [facility] Outpatient Physical Therapy   [address]   Phone: [phone]   Fax: [phone]    Physical Therapy Daily Treatment Note      Date:  3/16/2022  Patient Name:  Nichole Michele    :  1950  MRN: [id number]  Physician: Lalo Williamson, [facility]                              Insurance: [facility]. Auth required after IE via [employer]. Eval + 4 visits 3/7/2022-2022 [id number] TE, [address]1115 TA, [address] [facility], [address] Manual  Medical Diagnosis: M54.50, G89.29 (ICD-10-CM) - Chronic midline low back pain without sciatica            Rehab Codes: M54.5, R53.1, M25.60  Onset Date: 2022                 Next 's appt: 22  Visit# / total visits: 2/5  Cancels/No Shows: 0/0    Subjective: patient was 13' late to session. Patient reporting to therapy with no new complaints and stating she is hoping she doesn't have to come to many appts as it costs too much to come to therapy. Pain:  [] Yes  [] No Location: center of back  Pain Rating: (0-10 scale) 6/10  Pain altered Tx:  [] No  [] Yes  Action:  Comments:    Objective:  Modalities:   Precautions:  Exercises:  Exercise Reps/ Time Weight/ Level Completed  Today Comments   Abdominal bracing 15x 3\" holds  X Provided education on abdominal bracing and to perform frequently when performing daily activities to increase trunk and core support. LTR 10x  X    Marching + TrA 10x  X    SLR + TrA 10x  X Had increase pressure noted in back   Open books 10x  X B to improve thoracic mobility   Seated Lower cervical and thoracic stetch 3x 30\"  X    Seated Thoracic extension 10x 5\" holds  X    SCap retraction ADD      Retro shoulder roll ADD      B ER with TB ADD      B Horizontal ABD with TB ADD      Thoracic rotation ADD      TB Rows and extension ADD      Other:  3/16: bolded exercises have been provided as HEP this session.     Specific Instructions for next treatment: Specific Instructions for next treatment: Emphasis on restoring full under load) to show safe ability to light household items with minimal risk for future injury to back  4. Pt will decrease DILLAN to 5% impaired or less in order to demonstrate improved functional tolerances at PLOF with minimal restriction/dysfunction  5. Pt will demonstrate independence with a long term HEP for continued progress/maintenance after completion of PT      Pt. Education:  [x] Yes  [] No  [] Reviewed Prior HEP/Ed  Method of Education: [x] Verbal  [] Demo  [x] Written  Comprehension of Education:  [x] Verbalizes understanding. [] Demonstrates understanding. [] Needs review. [] Demonstrates/verbalizes HEP/Ed previously given. Plan: [x] Continue per plan of care.    [] Other:      Treatment Charges: Mins Units   []  Modalities     [x]  Ther Exercise 30 2   []  Manual Therapy     []  Ther Activities     []  Aquatics     []  Neuromuscular     [] Vasocompression     [] Gait Training     [] Dry needling        [] 1 or 2 muscles        [] 3 or more muscles     []  Other     Total Treatment time 30 2     Time In: 0900         Time Out: 0930    Electronically signed by:  Diann Wilcox PTA

## 2022-03-18 ENCOUNTER — HOSPITAL ENCOUNTER (OUTPATIENT)
Dept: PHYSICAL THERAPY | Age: 72
Setting detail: THERAPIES SERIES
Discharge: HOME | End: 2022-03-18
Payer: MEDICARE

## 2022-03-18 NOTE — FLOWSHEET NOTE
[] [facility]) - [facility] & Therapy  [address]  Elaine Reagin: [phone]   F: [phone]     Physical Therapy Cancel/No Show note    Date: 3/18/2022  Patient: Riddhi Fan  : 1950  MRN: [id number]    Visit Count: 2/5  Cancels/No Shows to date:     For today's appointment patient:    [x]  Cancelled    [] Rescheduled appointment    [] No-show     Reason given by patient:    []  Patient ill    []  Conflicting appointment    [] No transportation      [] Conflict with work    [x] No reason given    [] Weather related    [] [id number]    [] Other:      Comments:        [] Next appointment was confirmed    Electronically signed by: Berenice Torres PTA

## 2022-03-21 ENCOUNTER — CLINICAL DOCUMENTATION (OUTPATIENT)
Dept: PHYSICAL THERAPY | Age: 72
End: 2022-03-21

## 2022-03-21 ENCOUNTER — APPOINTMENT (OUTPATIENT)
Dept: PHYSICAL THERAPY | Age: 72
End: 2022-03-21
Payer: MEDICARE

## 2022-03-21 NOTE — DISCHARGE SUMMARY
[x] [facility]) @ [facility]  [address] , [address] [place] [employer] [facility]  Phone [phone]  Fax [phone]    Physical Therapy Discharge Note    Date: 3/21/2022      Patient: Yvonne Romano  : 1950  MRN: [id number]    Physician: Kiara Bro MD     Diagnosis: M54.50, G89.29 (ICD-10-CM) - Chronic midline low back pain without sciatica  Onset Date: 2022    Rehab Diagnosis: Chronic back pain, weakness, stiffness  ICD-10 Codes: M54.5, R53.1, M25.60  Total visits attended: 2/5  Cancels/No shows: 0/  Date of initial visit: 3/7/22                   [] Patient recovered from conditions. Treatment goals were met. [] Patient received maximum benefit. No further therapy indicated at this time. [] Patient demonstrated improvement from condition with  ** Of  ** Short term goals met. []Patient demonstrated improvement from condition with **   Of **  Long term goals met. [] Patient to continue exercise/home instructions independently. [] Therapy interrupted due to:    [] Patient has 2 or more no shows/cancels, is discontinued per our policy. [] Patient has completed prescribed number of treatment sessions. [x] Other: Patient self-discharged after completion of 1st follow up appt. Called into clinic to cancel all future appts. Previously had mentioned concern for finances. Pain level at evaluation was      6 /10 and at discharge was     Unknown  /10    It Is My Understanding That The:  [] Patient returned to work. [] Patient demonstrated improved level of function. [] Patient returned to previous functional level.   [] Patient's current functional status is unknown due to no-shows  [x] Other: Self-discharged     Recommendations/Comments:       Treatment Included:     [x] Therapeutic Exercise   87743  [] Iontophoresis: 4 mg/mL Dexamethasone Sodium Phosphate  mAmin  86067   [] Therapeutic Activity  25213 [] Vasopneumatic cold with compression  [id number]    [] Gait Training   [id number] [] Ultrasound   [id number]   [] Neuromuscular Re-education  [id number] [] Electrical Stimulation Unattended  23151   [] Manual Therapy  44041 [] Electrical Stimulation Attended  [id number]   [x] Instruction in HEP  [] Lumbar/Cervical Traction  [id number]   [] Aquatic Therapy   [id number] [] Cold/hotpack    [] Massage   [id number]      [] Dry Needling, 1 or 2 muscles  10567   [] Biofeedback, first 15 minutes   18932  [] Biofeedback, additional 15 minutes   70818 [] Dry Needling, 3 or more muscles  67937                If you have any questions or concerns regarding this patient's care, please contact us.    Thank you for your referral.      Electronically signed by: Pamella Flower PT

## 2022-03-23 ENCOUNTER — APPOINTMENT (OUTPATIENT)
Dept: PHYSICAL THERAPY | Age: 72
End: 2022-03-23
Payer: MEDICARE

## 2022-03-29 ENCOUNTER — NURSE TRIAGE (OUTPATIENT)
Dept: OTHER | Facility: CLINIC | Age: 72
End: 2022-03-29

## 2022-03-29 ENCOUNTER — APPOINTMENT (OUTPATIENT)
Dept: CT IMAGING | Age: 72
DRG: 841 | End: 2022-03-29
Payer: MEDICARE

## 2022-03-29 ENCOUNTER — HOSPITAL ENCOUNTER (EMERGENCY)
Age: 72
Discharge: HOME | DRG: 841 | End: 2022-03-29
Attending: EMERGENCY MEDICINE
Payer: MEDICARE

## 2022-03-29 VITALS
OXYGEN SATURATION: 100 % | SYSTOLIC BLOOD PRESSURE: 174 MMHG | DIASTOLIC BLOOD PRESSURE: 83 MMHG | HEIGHT: 62 IN | BODY MASS INDEX: 36.8 KG/M2 | RESPIRATION RATE: 18 BRPM | HEART RATE: 106 BPM | WEIGHT: 200 LBS | TEMPERATURE: 98.2 F

## 2022-03-29 DIAGNOSIS — R19.00 ABDOMINAL MASS, UNSPECIFIED ABDOMINAL LOCATION: ICD-10-CM

## 2022-03-29 DIAGNOSIS — R10.10 PAIN OF UPPER ABDOMEN: Primary | ICD-10-CM

## 2022-03-29 LAB
ABSOLUTE EOS #: 0.2 K/UL (ref 0–0.4)
ABSOLUTE LYMPH #: 1.2 K/UL (ref 1–4.8)
ABSOLUTE MONO #: 0.6 K/UL (ref 0.1–1.3)
ALBUMIN SERPL-MCNC: 4 G/DL (ref 3.5–5.2)
ALP BLD-CCNC: 84 U/L (ref 35–104)
ALT SERPL-CCNC: 13 U/L (ref 5–33)
ANION GAP SERPL CALCULATED.3IONS-SCNC: 13 MMOL/L (ref 9–17)
AST SERPL-CCNC: 17 U/L
BACTERIA: ABNORMAL
BASOPHILS # BLD: 0 % (ref 0–2)
BASOPHILS ABSOLUTE: 0 K/UL (ref 0–0.2)
BILIRUB SERPL-MCNC: 0.36 MG/DL (ref 0.3–1.2)
BILIRUBIN URINE: NEGATIVE
BUN BLDV-MCNC: 15 MG/DL (ref 8–23)
CALCIUM SERPL-MCNC: 10 MG/DL (ref 8.6–10.4)
CASTS UA: ABNORMAL /LPF
CHLORIDE BLD-SCNC: 102 MMOL/L (ref 98–107)
CO2: 24 MMOL/L (ref 20–31)
COLOR: YELLOW
CREAT SERPL-MCNC: 0.61 MG/DL (ref 0.5–0.9)
EOSINOPHILS RELATIVE PERCENT: 4 % (ref 0–4)
EPITHELIAL CELLS UA: ABNORMAL /HPF
GFR AFRICAN AMERICAN: >60 ML/MIN
GFR NON-AFRICAN AMERICAN: >60 ML/MIN
GFR SERPL CREATININE-BSD FRML MDRD: NORMAL ML/MIN/{1.73_M2}
GLUCOSE BLD-MCNC: 85 MG/DL (ref 70–99)
GLUCOSE URINE: NEGATIVE
HCT VFR BLD CALC: 41.2 % (ref 36–46)
HEMOGLOBIN: 14 G/DL (ref 12–16)
INR BLD: 1
KETONES, URINE: ABNORMAL
LEUKOCYTE ESTERASE, URINE: ABNORMAL
LIPASE: 28 U/L (ref 13–60)
LYMPHOCYTES # BLD: 21 % (ref 24–44)
MCH RBC QN AUTO: 30.3 PG (ref 26–34)
MCHC RBC AUTO-ENTMCNC: 33.9 G/DL (ref 31–37)
MCV RBC AUTO: 89.5 FL (ref 80–100)
MONOCYTES # BLD: 10 % (ref 1–7)
NITRITE, URINE: NEGATIVE
PARTIAL THROMBOPLASTIN TIME: 33.5 SEC (ref 24–36)
PDW BLD-RTO: 12.8 % (ref 11.5–14.9)
PH UA: 5 (ref 5–8)
PLATELET # BLD: 285 K/UL (ref 150–450)
PMV BLD AUTO: 7.6 FL (ref 6–12)
POTASSIUM SERPL-SCNC: 3.8 MMOL/L (ref 3.7–5.3)
PROTEIN UA: NEGATIVE
PROTHROMBIN TIME: 12.7 SEC (ref 11.8–14.6)
RBC # BLD: 4.6 M/UL (ref 4–5.2)
RBC UA: ABNORMAL /HPF
SEG NEUTROPHILS: 65 % (ref 36–66)
SEGMENTED NEUTROPHILS ABSOLUTE COUNT: 3.8 K/UL (ref 1.3–9.1)
SODIUM BLD-SCNC: 139 MMOL/L (ref 135–144)
SPECIFIC GRAVITY UA: 1.02 (ref 1–1.03)
TOTAL PROTEIN: 7 G/DL (ref 6.4–8.3)
TROPONIN, HIGH SENSITIVITY: 12 NG/L (ref 0–14)
TROPONIN, HIGH SENSITIVITY: 18 NG/L (ref 0–14)
TURBIDITY: ABNORMAL
URINE HGB: NEGATIVE
UROBILINOGEN, URINE: NORMAL
WBC # BLD: 5.9 K/UL (ref 3.5–11)
WBC UA: ABNORMAL /HPF

## 2022-03-29 PROCEDURE — 80053 COMPREHEN METABOLIC PANEL: CPT

## 2022-03-29 PROCEDURE — 36415 COLL VENOUS BLD VENIPUNCTURE: CPT

## 2022-03-29 PROCEDURE — 93005 ELECTROCARDIOGRAM TRACING: CPT | Performed by: EMERGENCY MEDICINE

## 2022-03-29 PROCEDURE — 84484 ASSAY OF TROPONIN QUANT: CPT

## 2022-03-29 PROCEDURE — 85730 THROMBOPLASTIN TIME PARTIAL: CPT

## 2022-03-29 PROCEDURE — 74174 CTA ABD&PLVS W/CONTRAST: CPT

## 2022-03-29 PROCEDURE — 87077 CULTURE AEROBIC IDENTIFY: CPT

## 2022-03-29 PROCEDURE — 85610 PROTHROMBIN TIME: CPT

## 2022-03-29 PROCEDURE — 99284 EMERGENCY DEPT VISIT MOD MDM: CPT

## 2022-03-29 PROCEDURE — 2580000003 HC RX 258: Performed by: EMERGENCY MEDICINE

## 2022-03-29 PROCEDURE — 87086 URINE CULTURE/COLONY COUNT: CPT

## 2022-03-29 PROCEDURE — 6360000004 HC RX CONTRAST MEDICATION: Performed by: EMERGENCY MEDICINE

## 2022-03-29 PROCEDURE — 87186 SC STD MICRODIL/AGAR DIL: CPT

## 2022-03-29 PROCEDURE — 81001 URINALYSIS AUTO W/SCOPE: CPT

## 2022-03-29 PROCEDURE — 83690 ASSAY OF LIPASE: CPT

## 2022-03-29 PROCEDURE — 85025 COMPLETE CBC W/AUTO DIFF WBC: CPT

## 2022-03-29 RX ORDER — IBUPROFEN 600 MG/1
600 TABLET ORAL EVERY 6 HOURS PRN
COMMUNITY
End: 2022-04-25

## 2022-03-29 RX ORDER — SODIUM CHLORIDE 0.9 % (FLUSH) 0.9 %
10 SYRINGE (ML) INJECTION PRN
Status: DISCONTINUED | OUTPATIENT
Start: 2022-03-29 | End: 2022-03-29 | Stop reason: HOSPADM

## 2022-03-29 RX ORDER — 0.9 % SODIUM CHLORIDE 0.9 %
80 INTRAVENOUS SOLUTION INTRAVENOUS ONCE
Status: COMPLETED | OUTPATIENT
Start: 2022-03-29 | End: 2022-03-29

## 2022-03-29 RX ADMIN — IOPAMIDOL 100 ML: 755 INJECTION, SOLUTION INTRAVENOUS at 13:35

## 2022-03-29 RX ADMIN — SODIUM CHLORIDE, PRESERVATIVE FREE 10 ML: 5 INJECTION INTRAVENOUS at 13:35

## 2022-03-29 RX ADMIN — SODIUM CHLORIDE 80 ML: 9 INJECTION, SOLUTION INTRAVENOUS at 13:38

## 2022-03-29 ASSESSMENT — PAIN SCALES - GENERAL: PAINLEVEL_OUTOF10: 3

## 2022-03-29 ASSESSMENT — ENCOUNTER SYMPTOMS
BACK PAIN: 0
ABDOMINAL PAIN: 1
TROUBLE SWALLOWING: 0
COUGH: 0
SHORTNESS OF BREATH: 0
SORE THROAT: 0
DIARRHEA: 0
VOMITING: 0
BLOOD IN STOOL: 0
NAUSEA: 0
COLOR CHANGE: 0
CONSTIPATION: 0

## 2022-03-29 ASSESSMENT — PAIN DESCRIPTION - LOCATION: LOCATION: ABDOMEN;BACK

## 2022-03-29 ASSESSMENT — PAIN - FUNCTIONAL ASSESSMENT: PAIN_FUNCTIONAL_ASSESSMENT: 0-10

## 2022-03-29 ASSESSMENT — PAIN DESCRIPTION - ORIENTATION: ORIENTATION: MID;UPPER

## 2022-03-29 NOTE — ED TRIAGE NOTES
Mode of arrival (squad #, walk in, police, etc) : walk in        Chief complaint(s): epigastric pain and back pain        Arrival Note (brief scenario, treatment PTA, etc). : onset 1 month- was sent to PT for back back (arthitis) and PCP did not do anything for abd pain. PT states she unable to eat and nausea. C= \"Have you ever felt that you should Cut down on your drinking? \"no  A= \"Have people Annoyed you by criticizing your drinking? \" no  G= \"Have you ever felt bad or Guilty about your drinking? \"  no  E= \"Have you ever had a drink as an Eye-opener first thing in the morning to steady your nerves or to help a hangover? \"  no      Deferred []      Reason for deferring: na    *If yes to two or more: probable alcohol abuse. *

## 2022-03-29 NOTE — TELEPHONE ENCOUNTER
[facility] [employer]. Subjective: Caller states \"This started a month ago. I went to the doctor with back pain. I had a pain in my stomach and it is right under the breast bone in the middle. Whatever I eat either sits there or comes back up and it seems like the less I eat the less I can eat. I can't drink anything. If I eat anything it feels like it's too much and it hurts. My stomach is bloated and I am throwing up bile\"     Current Symptoms: stomach pain and fullness    Onset: 1 month ago; worsening    Associated Symptoms: reduced appetite, reduced fluid intake    Pain Severity: 3/10; aching, ; constant    Temperature: none     What has been tried: nothing    LMP: NA Pregnant: NA    Recommended disposition: Go to ED/UCC Now (Or to Office with PCP Approval)    Care advice provided, patient verbalizes understanding; denies any other questions or concerns; instructed to call back for any new or worsening symptoms. Writer provided warm transfer to Kendrick Vazquez at [facility] for Vomiting bile, stomach fullness, epigastric pain, bloating, unable to eat or drink     Attention Provider: Thank you for allowing me to participate in the care of your patient. The patient was connected to triage in response to information provided to the ECC/PSC. Please do not respond through this encounter as the response is not directed to a shared pool.         Reason for Disposition   Constant abdominal pain lasting > 2 hours    Protocols used: ABDOMINAL PAIN - UPPER-ADULT-OH
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
## 2022-03-29 NOTE — ED NOTES
Pt attempted to void but states she missed the sample for UA     Manda Lopez, HAVEN  03/29/22 [id number], RN  03/29/22 [id number]

## 2022-03-29 NOTE — TELEPHONE ENCOUNTER
Spoke to patient and she was complaining of being in so much pain, uncomfortable and unable to keep anything down. Due to severity. I did advise seek medical attention in er. Please advise.

## 2022-03-29 NOTE — PROGRESS NOTES
Medication History completed:    New medications: ibuprofen 600 MG     Medications discontinued: lidocaine 4 % cream, ibuprofen 800 MG     Other pertinent information: Medications were confirmed with patient. patient stated that the last time she had denosumab 60 MG/M was almost 8 months ago    Thank you,  Luba Sánchez, PharmD Candidate 2022

## 2022-03-30 ENCOUNTER — APPOINTMENT (OUTPATIENT)
Dept: CT IMAGING | Age: 72
DRG: 841 | End: 2022-03-30
Payer: MEDICARE

## 2022-03-30 ENCOUNTER — HOSPITAL ENCOUNTER (INPATIENT)
Age: 72
LOS: 2 days | Discharge: HOME | DRG: 841 | End: 2022-04-01
Attending: STUDENT IN AN ORGANIZED HEALTH CARE EDUCATION/TRAINING PROGRAM | Admitting: INTERNAL MEDICINE
Payer: MEDICARE

## 2022-03-30 DIAGNOSIS — R19.00 ABDOMINAL MASS, UNSPECIFIED ABDOMINAL LOCATION: Primary | ICD-10-CM

## 2022-03-30 LAB
EKG ATRIAL RATE: 106 BPM
EKG P AXIS: 50 DEGREES
EKG P-R INTERVAL: 126 MS
EKG Q-T INTERVAL: 326 MS
EKG QRS DURATION: 74 MS
EKG QTC CALCULATION (BAZETT): 433 MS
EKG R AXIS: 58 DEGREES
EKG T AXIS: -5 DEGREES
EKG VENTRICULAR RATE: 106 BPM
LACTATE DEHYDROGENASE: 203 U/L (ref 135–214)

## 2022-03-30 PROCEDURE — 6370000000 HC RX 637 (ALT 250 FOR IP)

## 2022-03-30 PROCEDURE — 88184 FLOWCYTOMETRY/ TC 1 MARKER: CPT

## 2022-03-30 PROCEDURE — 93010 ELECTROCARDIOGRAM REPORT: CPT | Performed by: INTERNAL MEDICINE

## 2022-03-30 PROCEDURE — 99223 1ST HOSP IP/OBS HIGH 75: CPT | Performed by: INTERNAL MEDICINE

## 2022-03-30 PROCEDURE — 1200000000 HC SEMI PRIVATE

## 2022-03-30 PROCEDURE — 6360000002 HC RX W HCPCS

## 2022-03-30 PROCEDURE — 2580000003 HC RX 258

## 2022-03-30 PROCEDURE — 99283 EMERGENCY DEPT VISIT LOW MDM: CPT

## 2022-03-30 PROCEDURE — 83615 LACTATE (LD) (LDH) ENZYME: CPT

## 2022-03-30 PROCEDURE — 71260 CT THORAX DX C+: CPT

## 2022-03-30 PROCEDURE — 88185 FLOWCYTOMETRY/TC ADD-ON: CPT

## 2022-03-30 PROCEDURE — 36415 COLL VENOUS BLD VENIPUNCTURE: CPT

## 2022-03-30 PROCEDURE — 2580000003 HC RX 258: Performed by: INTERNAL MEDICINE

## 2022-03-30 PROCEDURE — 6360000004 HC RX CONTRAST MEDICATION: Performed by: INTERNAL MEDICINE

## 2022-03-30 RX ORDER — VITAMIN B COMPLEX
1000 TABLET ORAL DAILY
Status: DISCONTINUED | OUTPATIENT
Start: 2022-03-30 | End: 2022-04-01 | Stop reason: HOSPADM

## 2022-03-30 RX ORDER — SODIUM CHLORIDE 0.9 % (FLUSH) 0.9 %
10 SYRINGE (ML) INJECTION PRN
Status: DISCONTINUED | OUTPATIENT
Start: 2022-03-30 | End: 2022-04-01 | Stop reason: HOSPADM

## 2022-03-30 RX ORDER — SODIUM CHLORIDE 0.9 % (FLUSH) 0.9 %
5-40 SYRINGE (ML) INJECTION EVERY 12 HOURS SCHEDULED
Status: DISCONTINUED | OUTPATIENT
Start: 2022-03-30 | End: 2022-04-01 | Stop reason: HOSPADM

## 2022-03-30 RX ORDER — ATORVASTATIN CALCIUM 20 MG/1
20 TABLET, FILM COATED ORAL DAILY
Status: DISCONTINUED | OUTPATIENT
Start: 2022-03-30 | End: 2022-04-01 | Stop reason: HOSPADM

## 2022-03-30 RX ORDER — TIZANIDINE 4 MG/1
4 TABLET ORAL NIGHTLY
Status: DISCONTINUED | OUTPATIENT
Start: 2022-03-30 | End: 2022-04-01 | Stop reason: HOSPADM

## 2022-03-30 RX ORDER — POLYETHYLENE GLYCOL 3350 17 G/17G
17 POWDER, FOR SOLUTION ORAL DAILY PRN
Status: DISCONTINUED | OUTPATIENT
Start: 2022-03-30 | End: 2022-04-01 | Stop reason: HOSPADM

## 2022-03-30 RX ORDER — SODIUM CHLORIDE 9 MG/ML
INJECTION, SOLUTION INTRAVENOUS CONTINUOUS
Status: DISCONTINUED | OUTPATIENT
Start: 2022-03-30 | End: 2022-04-01 | Stop reason: HOSPADM

## 2022-03-30 RX ORDER — 0.9 % SODIUM CHLORIDE 0.9 %
80 INTRAVENOUS SOLUTION INTRAVENOUS ONCE
Status: COMPLETED | OUTPATIENT
Start: 2022-03-30 | End: 2022-03-30

## 2022-03-30 RX ORDER — ASPIRIN 81 MG/1
81 TABLET ORAL DAILY
Status: DISCONTINUED | OUTPATIENT
Start: 2022-03-30 | End: 2022-04-01 | Stop reason: HOSPADM

## 2022-03-30 RX ORDER — ONDANSETRON 4 MG/1
4 TABLET, ORALLY DISINTEGRATING ORAL EVERY 8 HOURS PRN
Status: DISCONTINUED | OUTPATIENT
Start: 2022-03-30 | End: 2022-04-01 | Stop reason: HOSPADM

## 2022-03-30 RX ORDER — SODIUM CHLORIDE 0.9 % (FLUSH) 0.9 %
5-40 SYRINGE (ML) INJECTION PRN
Status: DISCONTINUED | OUTPATIENT
Start: 2022-03-30 | End: 2022-04-01 | Stop reason: HOSPADM

## 2022-03-30 RX ORDER — ONDANSETRON 2 MG/ML
4 INJECTION INTRAMUSCULAR; INTRAVENOUS EVERY 6 HOURS PRN
Status: DISCONTINUED | OUTPATIENT
Start: 2022-03-30 | End: 2022-04-01 | Stop reason: HOSPADM

## 2022-03-30 RX ORDER — SODIUM CHLORIDE 9 MG/ML
INJECTION, SOLUTION INTRAVENOUS PRN
Status: DISCONTINUED | OUTPATIENT
Start: 2022-03-30 | End: 2022-04-01 | Stop reason: HOSPADM

## 2022-03-30 RX ADMIN — SODIUM CHLORIDE 80 ML: 9 INJECTION, SOLUTION INTRAVENOUS at 17:46

## 2022-03-30 RX ADMIN — ATORVASTATIN CALCIUM 20 MG: 20 TABLET, FILM COATED ORAL at 15:21

## 2022-03-30 RX ADMIN — IOPAMIDOL 75 ML: 755 INJECTION, SOLUTION INTRAVENOUS at 17:46

## 2022-03-30 RX ADMIN — ENOXAPARIN SODIUM 40 MG: 100 INJECTION SUBCUTANEOUS at 15:22

## 2022-03-30 RX ADMIN — SODIUM CHLORIDE: 9 INJECTION, SOLUTION INTRAVENOUS at 15:21

## 2022-03-30 RX ADMIN — Medication 1000 UNITS: at 15:21

## 2022-03-30 RX ADMIN — SODIUM CHLORIDE, PRESERVATIVE FREE 10 ML: 5 INJECTION INTRAVENOUS at 17:46

## 2022-03-30 RX ADMIN — ASPIRIN 81 MG: 81 TABLET, COATED ORAL at 15:21

## 2022-03-30 ASSESSMENT — PAIN DESCRIPTION - LOCATION
LOCATION: ABDOMEN;BACK
LOCATION: ABDOMEN
LOCATION: ABDOMEN

## 2022-03-30 ASSESSMENT — ENCOUNTER SYMPTOMS
NAUSEA: 1
RHINORRHEA: 0
COUGH: 0
EYE ITCHING: 0
FACIAL SWELLING: 0
CONSTIPATION: 0
PHOTOPHOBIA: 0
VOMITING: 1
ABDOMINAL PAIN: 1
COLOR CHANGE: 0
NAUSEA: 0
CHEST TIGHTNESS: 0
VOMITING: 0
DIARRHEA: 0
SHORTNESS OF BREATH: 0

## 2022-03-30 ASSESSMENT — PAIN DESCRIPTION - ORIENTATION
ORIENTATION: MID
ORIENTATION: MID

## 2022-03-30 ASSESSMENT — PAIN DESCRIPTION - DESCRIPTORS
DESCRIPTORS: DISCOMFORT;PRESSURE
DESCRIPTORS: DISCOMFORT
DESCRIPTORS: ACHING;DISCOMFORT

## 2022-03-30 ASSESSMENT — PAIN DESCRIPTION - PAIN TYPE
TYPE: ACUTE PAIN

## 2022-03-30 ASSESSMENT — PAIN DESCRIPTION - ONSET: ONSET: ON-GOING

## 2022-03-30 ASSESSMENT — PAIN SCALES - GENERAL
PAINLEVEL_OUTOF10: 3
PAINLEVEL_OUTOF10: 4

## 2022-03-30 ASSESSMENT — PAIN DESCRIPTION - FREQUENCY
FREQUENCY: CONTINUOUS

## 2022-03-30 ASSESSMENT — PAIN - FUNCTIONAL ASSESSMENT
PAIN_FUNCTIONAL_ASSESSMENT: 0-10
PAIN_FUNCTIONAL_ASSESSMENT: 0-10
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
## 2022-03-30 NOTE — ED NOTES
Report given to Mirna Stallworth RN from [facility]. Report method by phone   The following was reviewed with receiving RN:   Current vital signs:  BP (!) 140/78   Pulse 96   Temp 97 °F (36.1 °C) (Temporal)   Resp 18   SpO2 98%                MEWS Score: 1     Any medication or safety alerts were reviewed. Any pending diagnostics and notifications were also reviewed, as well as any safety concerns or issues, abnormal labs, abnormal imaging, and abnormal assessment findings. Questions were answered.               Luzma Valencia RN  03/30/22 [id number]

## 2022-03-31 ENCOUNTER — APPOINTMENT (OUTPATIENT)
Dept: CT IMAGING | Age: 72
DRG: 841 | End: 2022-03-31
Payer: MEDICARE

## 2022-03-31 PROBLEM — E44.0 MODERATE MALNUTRITION (HCC): Chronic | Status: ACTIVE | Noted: 2022-03-31

## 2022-03-31 LAB
ABSOLUTE EOS #: 0.3 K/UL (ref 0–0.4)
ABSOLUTE LYMPH #: 1 K/UL (ref 1–4.8)
ABSOLUTE MONO #: 0.6 K/UL (ref 0.1–1.3)
ANION GAP SERPL CALCULATED.3IONS-SCNC: 11 MMOL/L (ref 9–17)
BASOPHILS # BLD: 0 % (ref 0–2)
BASOPHILS ABSOLUTE: 0 K/UL (ref 0–0.2)
BUN BLDV-MCNC: 11 MG/DL (ref 8–23)
CALCIUM SERPL-MCNC: 9.3 MG/DL (ref 8.6–10.4)
CHLORIDE BLD-SCNC: 108 MMOL/L (ref 98–107)
CO2: 23 MMOL/L (ref 20–31)
CREAT SERPL-MCNC: 0.53 MG/DL (ref 0.5–0.9)
CULTURE: ABNORMAL
EOSINOPHILS RELATIVE PERCENT: 6 % (ref 0–4)
FLOW CYTOMETRY BL: NORMAL
GFR AFRICAN AMERICAN: >60 ML/MIN
GFR NON-AFRICAN AMERICAN: >60 ML/MIN
GFR SERPL CREATININE-BSD FRML MDRD: ABNORMAL ML/MIN/{1.73_M2}
GLUCOSE BLD-MCNC: 89 MG/DL (ref 70–99)
HCT VFR BLD CALC: 39.2 % (ref 36–46)
HEMOGLOBIN: 13.2 G/DL (ref 12–16)
LYMPHOCYTES # BLD: 21 % (ref 24–44)
MCH RBC QN AUTO: 30.2 PG (ref 26–34)
MCHC RBC AUTO-ENTMCNC: 33.7 G/DL (ref 31–37)
MCV RBC AUTO: 89.7 FL (ref 80–100)
MONOCYTES # BLD: 12 % (ref 1–7)
PDW BLD-RTO: 13.2 % (ref 11.5–14.9)
PLATELET # BLD: 266 K/UL (ref 150–450)
PMV BLD AUTO: 7.1 FL (ref 6–12)
POTASSIUM SERPL-SCNC: 3.8 MMOL/L (ref 3.7–5.3)
RBC # BLD: 4.38 M/UL (ref 4–5.2)
SEG NEUTROPHILS: 61 % (ref 36–66)
SEGMENTED NEUTROPHILS ABSOLUTE COUNT: 3 K/UL (ref 1.3–9.1)
SODIUM BLD-SCNC: 142 MMOL/L (ref 135–144)
SPECIMEN DESCRIPTION: ABNORMAL
WBC # BLD: 4.8 K/UL (ref 3.5–11)

## 2022-03-31 PROCEDURE — 36415 COLL VENOUS BLD VENIPUNCTURE: CPT

## 2022-03-31 PROCEDURE — 2580000003 HC RX 258

## 2022-03-31 PROCEDURE — 6370000000 HC RX 637 (ALT 250 FOR IP)

## 2022-03-31 PROCEDURE — 0WBH3ZX EXCISION OF RETROPERITONEUM, PERCUTANEOUS APPROACH, DIAGNOSTIC: ICD-10-PCS | Performed by: RADIOLOGY

## 2022-03-31 PROCEDURE — 88307 TISSUE EXAM BY PATHOLOGIST: CPT

## 2022-03-31 PROCEDURE — 99232 SBSQ HOSP IP/OBS MODERATE 35: CPT | Performed by: INTERNAL MEDICINE

## 2022-03-31 PROCEDURE — 2709999900 CT BIOPSY ABDOMEN RETROPERITONEUM

## 2022-03-31 PROCEDURE — 88341 IMHCHEM/IMCYTCHM EA ADD ANTB: CPT

## 2022-03-31 PROCEDURE — 80048 BASIC METABOLIC PNL TOTAL CA: CPT

## 2022-03-31 PROCEDURE — 6360000002 HC RX W HCPCS: Performed by: RADIOLOGY

## 2022-03-31 PROCEDURE — 1200000000 HC SEMI PRIVATE

## 2022-03-31 PROCEDURE — 88334 PATH CONSLTJ SURG CYTO XM EA: CPT

## 2022-03-31 PROCEDURE — 88342 IMHCHEM/IMCYTCHM 1ST ANTB: CPT

## 2022-03-31 PROCEDURE — 88333 PATH CONSLTJ SURG CYTO XM 1: CPT

## 2022-03-31 PROCEDURE — 88360 TUMOR IMMUNOHISTOCHEM/MANUAL: CPT

## 2022-03-31 PROCEDURE — 88184 FLOWCYTOMETRY/ TC 1 MARKER: CPT

## 2022-03-31 PROCEDURE — 88185 FLOWCYTOMETRY/TC ADD-ON: CPT

## 2022-03-31 PROCEDURE — 85025 COMPLETE CBC W/AUTO DIFF WBC: CPT

## 2022-03-31 RX ORDER — MIDAZOLAM HYDROCHLORIDE 1 MG/ML
2 INJECTION INTRAMUSCULAR; INTRAVENOUS ONCE
Status: COMPLETED | OUTPATIENT
Start: 2022-03-31 | End: 2022-03-31

## 2022-03-31 RX ORDER — FENTANYL CITRATE 50 UG/ML
50 INJECTION, SOLUTION INTRAMUSCULAR; INTRAVENOUS ONCE
Status: COMPLETED | OUTPATIENT
Start: 2022-03-31 | End: 2022-03-31

## 2022-03-31 RX ADMIN — FENTANYL CITRATE 50 MCG: 50 INJECTION, SOLUTION INTRAMUSCULAR; INTRAVENOUS at 11:54

## 2022-03-31 RX ADMIN — MIDAZOLAM 2 MG: 1 INJECTION INTRAMUSCULAR; INTRAVENOUS at 11:54

## 2022-03-31 RX ADMIN — ATORVASTATIN CALCIUM 20 MG: 20 TABLET, FILM COATED ORAL at 08:36

## 2022-03-31 RX ADMIN — Medication 1000 UNITS: at 08:36

## 2022-03-31 RX ADMIN — SODIUM CHLORIDE: 9 INJECTION, SOLUTION INTRAVENOUS at 16:53

## 2022-03-31 RX ADMIN — TIZANIDINE 4 MG: 4 TABLET ORAL at 20:55

## 2022-03-31 ASSESSMENT — ENCOUNTER SYMPTOMS
NAUSEA: 0
CONSTIPATION: 0
COUGH: 0
CHEST TIGHTNESS: 0
SHORTNESS OF BREATH: 0
VOMITING: 0
ABDOMINAL PAIN: 1
DIARRHEA: 0

## 2022-03-31 ASSESSMENT — PAIN SCALES - GENERAL
PAINLEVEL_OUTOF10: 0

## 2022-03-31 NOTE — PLAN OF CARE
Nutrition Problem #1: Inadequate oral intake  Intervention: Food and/or Nutrient Delivery: Continue Current Diet,Continue Oral Nutrition Supplement  Nutritional Goals: Meet estimated nutrition needs closely
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
## 2022-03-31 NOTE — POST SEDATION
Sedation Post Procedure Note    Patient Name: Rachana Peterson   YOB: 1950  Room/Bed: 2058/2058-01  Medical Record Number: [id number]  Date: 3/31/2022   Time: 12:20 PM         Physicians/Assistants: Aruna Schmidt MD, MD    Procedure Performed:  RP mass bx    Post-Sedation Vital Signs:  Vitals:    03/31/22 1200   BP: (!) 166/81   Pulse: 110   Resp:    Temp:    SpO2: 99%      Vital signs were reviewed and were stable after the procedure (see flow sheet for vitals)            Post-Sedation Exam: pt remains stable           Complications: none    Electronically signed by Aruna Schmidt MD on 3/31/2022 at 12:20 PM

## 2022-03-31 NOTE — PROGRESS NOTES
Comprehensive Nutrition Assessment    Type and Reason for Visit:  Positive Nutrition Screen (weight loss, poor appetite poor intake)    Nutrition Recommendations/Plan: Continue current diet and supplements    Nutrition Assessment:  Patient admission is related to abdominal pain, secondary to large retroperitoneal mass, possibly lymphoma. Was NPO earlier today for biopsy. Diet advanced to regular after biopsy. Patient said she don't have much of appetite, eats small amounts of food, drinks Ensure. Patient said she is vegetarian, able to pick foods from menu. Will continue current diet and supplements. Malnutrition Assessment:  Malnutrition Status: Moderate malnutrition    Context:  Acute Illness     Findings of the 6 clinical characteristics of malnutrition:  Energy Intake:  7 - 50% or less of estimated energy requirements for 5 or more days  Weight Loss:  7 - Greater than 5% over 1 month     Body Fat Loss:  Unable to assess     Muscle Mass Loss:  Unable to assess    Fluid Accumulation:  1 - Mild Extremities   Strength:  Not Performed    Estimated Daily Nutrient Needs:  Energy (kcal):  1588 kcal based on [place] Jeor and 1.2 factor; Weight Used for Energy Requirements:  Current     Protein (g):   gm based on 1.8-2.0 gm/kg ideal; Weight Used for Protein Requirements:  Ideal        Fluid (ml/day):   ; Method Used for Fluid Requirements:  1 ml/kcal      Nutrition Related Findings:  Edema: +1 BLE. Bowel sounds active. last BM 3/30. Labs and meds reviewed      Wounds:  None       Current Nutrition Therapies:    ADULT DIET;  Regular  ADULT ORAL NUTRITION SUPPLEMENT; Breakfast, Lunch, Dinner; Standard High Calorie/High Protein Oral Supplement    Anthropometric Measures:  · Height: 5' 2\" (157.5 cm)  · Current Body Weight: 188 lb 7.9 oz (85.5 kg)   · Admission Body Weight: 200 lb (90.7 kg)    · Usual Body Weight: 200 lb (90.7 kg)     · Ideal Body Weight: 110 lbs; % Ideal Body Weight 171.4 %   · BMI: 34.5  · Adjusted Body Weight:  ; No Adjustment   · BMI Categories: Obese Class 2 (BMI 35.0 -39.9)       Nutrition Diagnosis:   · Inadequate oral intake related to altered GI function as evidenced by intake 0-25%,weight loss greater than or equal to 5% in 1 month,GI abnormality    Nutrition Interventions:   Food and/or Nutrient Delivery:  Continue Current Diet,Continue Oral Nutrition Supplement  Nutrition Education/Counseling:  Education not indicated   Coordination of Nutrition Care:  Continue to monitor while inpatient    Goals:  Meet estimated nutrition needs closely       Nutrition Monitoring and Evaluation:   Behavioral-Environmental Outcomes:  None Identified   Food/Nutrient Intake Outcomes:  Food and Nutrient Intake,Supplement Intake  Physical Signs/Symptoms Outcomes:  Biochemical Data,Weight,GI Status     Discharge Planning:     Too soon to determine       Some areas of assessment may be incomplete due to COVID-19 precautions    Bin Campos RD, LD  Office phone [phone]

## 2022-03-31 NOTE — PROGRESS NOTES
[facility]   OCCUPATIONAL THERAPY MISSED TREATMENT NOTE   INPATIENT   Date: 3/31/22  Patient Name: Albertina Quintana       Room: 2893/6012-70  MRN: [id number]   Account #: [de-identified]    : 1950  (75 y.o.)  Gender: female                 REASON FOR MISSED TREATMENT:  Patient at testing and/or off the floor   -    NEEDS EVAL 3-31-22 Off unit for Biopsy (DM)      Bonnie Stephenson OT

## 2022-03-31 NOTE — BRIEF OP NOTE
Brief Postoperative Note    Nallely Mcdermott  YOB: 1950  [id number]    Pre-operative Diagnosis: RP mass    Post-operative Diagnosis: Same    Procedure: RP mass bx    Anesthesia: Local and Moderate Sedation    Surgeons/Assistants: Bhavik Marsh MD    Estimated Blood Loss: 1mL    Complications: None    Specimens: Was Obtained: 18g core x 4    Findings: successful retroperitoneal mass core bx     Electronically signed by Bhavik Marsh MD on 3/31/2022 at 12:20 PM

## 2022-03-31 NOTE — PLAN OF CARE
Problem: Pain:  Goal: Pain level will decrease  Description: Pain level will decrease  Outcome: Ongoing  Goal: Control of acute pain  Description: Control of acute pain  Outcome: Ongoing  Goal: Control of chronic pain  Description: Control of chronic pain  Outcome: Ongoing     Problem: SAFETY  Goal: Free from accidental physical injury  Outcome: Ongoing  Note: Patient is alert and oriented and able to make needs known. Goal: Free from intentional harm  Outcome: Ongoing     Problem: DAILY CARE  Goal: Daily care needs are met  Outcome: Ongoing     Problem: SKIN INTEGRITY  Goal: Skin integrity is maintained or improved  Outcome: Ongoing     Problem: KNOWLEDGE DEFICIT  Goal: Patient/S.O. demonstrates understanding of disease process, treatment plan, medications, and discharge instructions.   Outcome: Ongoing     Problem: DISCHARGE BARRIERS  Goal: Patient's continuum of care needs are met  Outcome: Ongoing
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
## 2022-03-31 NOTE — CARE COORDINATION
CASE MANAGEMENT NOTE:    Admission Date:  3/30/2022 Nita King is a 70 y.o.  female    Admitted for : Retroperitoneal mass [R19.00]  Abdominal mass, unspecified abdominal location [R19.00]    Met with:  Patient    PCP:  Jhony Bautista                                Insurance:  [employer]      Is patient alert and oriented at time of discussion:  Yes    Current Residence/ Living Arrangements:  independently at home             Current Services PTA:  No    Does patient go to outpatient dialysis: No  If yes, location and chair time:     Is patient agreeable to VNS: No    Freedom of choice provided:  Yes    List of [address] provided: No    VNS chosen:  No    DME:  none    Home Oxygen: No    Nebulizer: No    CPAP/BIPAP: No    Supplier: N/A    Potential Assistance Needed: No    SNF needed: No    Freedom of choice and list provided: NA    Pharmacy:  [facility]       Does Patient want to use MEDS to BEDS? No    Is patient currently receiving oral anticoagulation therapy? No    Is the Patient an [facility] with Readmission Risk Score greater than 14%? No  If yes, pt needs a follow up appointment made within 7 days. Family Members/Caregivers that pt would like involved in their care:    Yes    If yes, list name here:  Daughter Celine Sal    Transportation Provider:  Family             Discharge Plan:  3/31/22 - [employer] medicare - Patient is from home alone, DME: None, VNS - denies, Plan is to return home with no needs. Will follow . //pf                 Electronically signed by: Guanaco Zhang RN on 3/31/2022 at 6:02 PM
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
## 2022-03-31 NOTE — PROGRESS NOTES
Perfect serve sent to Dr. Manpreet Moreno who is providing coverage for Dr. Tracey Maya for new consult re: retroperitoneal mass.

## 2022-04-01 VITALS
WEIGHT: 200 LBS | TEMPERATURE: 97.7 F | BODY MASS INDEX: 36.8 KG/M2 | HEART RATE: 93 BPM | RESPIRATION RATE: 16 BRPM | SYSTOLIC BLOOD PRESSURE: 147 MMHG | OXYGEN SATURATION: 96 % | DIASTOLIC BLOOD PRESSURE: 80 MMHG | HEIGHT: 62 IN

## 2022-04-01 LAB
MISCELLANEOUS LAB TEST RESULT: NORMAL
TEST NAME: NORMAL

## 2022-04-01 PROCEDURE — 2580000003 HC RX 258

## 2022-04-01 PROCEDURE — 6360000002 HC RX W HCPCS

## 2022-04-01 PROCEDURE — 99238 HOSP IP/OBS DSCHRG MGMT 30/<: CPT | Performed by: INTERNAL MEDICINE

## 2022-04-01 PROCEDURE — 6370000000 HC RX 637 (ALT 250 FOR IP)

## 2022-04-01 PROCEDURE — 99221 1ST HOSP IP/OBS SF/LOW 40: CPT | Performed by: SURGERY

## 2022-04-01 RX ORDER — ONDANSETRON 4 MG/1
4 TABLET, ORALLY DISINTEGRATING ORAL EVERY 8 HOURS PRN
Qty: 30 TABLET | Refills: 0 | Status: SHIPPED | OUTPATIENT
Start: 2022-04-01 | End: 2022-04-01

## 2022-04-01 RX ORDER — ONDANSETRON 4 MG/1
4 TABLET, ORALLY DISINTEGRATING ORAL EVERY 8 HOURS PRN
Qty: 30 TABLET | Refills: 0 | Status: SHIPPED | OUTPATIENT
Start: 2022-04-01 | End: 2022-04-27 | Stop reason: ALTCHOICE

## 2022-04-01 RX ADMIN — SODIUM CHLORIDE, PRESERVATIVE FREE 10 ML: 5 INJECTION INTRAVENOUS at 08:14

## 2022-04-01 RX ADMIN — ENOXAPARIN SODIUM 40 MG: 100 INJECTION SUBCUTANEOUS at 09:00

## 2022-04-01 RX ADMIN — ASPIRIN 81 MG: 81 TABLET, COATED ORAL at 08:13

## 2022-04-01 RX ADMIN — Medication 1000 UNITS: at 08:13

## 2022-04-01 RX ADMIN — ATORVASTATIN CALCIUM 20 MG: 20 TABLET, FILM COATED ORAL at 08:13

## 2022-04-01 ASSESSMENT — PAIN SCALES - GENERAL: PAINLEVEL_OUTOF10: 0

## 2022-04-01 ASSESSMENT — ENCOUNTER SYMPTOMS
CONSTIPATION: 0
VOMITING: 0
ABDOMINAL PAIN: 1
NAUSEA: 1
SHORTNESS OF BREATH: 0
DIARRHEA: 0

## 2022-04-01 NOTE — FLOWSHEET NOTE
04/01/22 1232   Encounter Summary   Services provided to: Patient   Referral/Consult From: Iliana   Continue Visiting   (4/1/22 V)   Volunteer Visit Yes   Complexity of Encounter Low   Length of Encounter 15 minutes   Spiritual/Lutheran   Type Spiritual support   Intervention Prayer

## 2022-04-01 NOTE — CONSULTS
Date of service 4/1/2022. Reason for consultation: Retroperitoneal mass with celiac arterial compression. Referring physician: Georgina Lazo. 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 I was called in regards to this compression and symptoms of postprandial fullness with nausea and sometimes emesis. Liquids do not cause this problem. She does not know if she has lost any weight. She does not smoke and is not diabetic. She does have high blood pressure but no high cholesterol. She has no pain at rest.  She denies claudication rest pain or ulceration. She has never had stroke. She has never had a heart attack. CTA was done showing what I think is significant compression from upward growth of the mass on the celiac artery. Cephalad to the celiac artery there does not seem to be a significant amount of tumor mass. Below the celiac artery however the superior mesenteric artery is encased in tumor but widely patent. The gastroduodenal and pancreaticoduodenal arcades are robust and supplying excellent collateral flow to the celiac which is functionally occluded if not anatomically occluded. The EVAN is patent and of normal caliber. On examination her abdomen is soft nontender nondistended. She has tenderness only to deeper palpation. She has no carotid bruits. She has palpable femoral, popliteal, dorsalis pedis and posterior tibial pulses bilaterally which are strong and equal.    Assessment/plan: At this point I think the best option is identification of the mass and treatment therein. I do not think that a stent will help her symptoms as the symptoms are usually a result of compression of the artery rather than its stenosis.   Her gastroduodenal arterial collaterals are supplying the hepatic and splenic arteries quite well and that system of collaterals is quite robust.  I discussed this at length with the patient and she understands and agrees. We will follow her loosely and treat accordingly.

## 2022-04-01 NOTE — PROGRESS NOTES
[address] [facility]    OCCUPATIONAL THERAPY TREATMENT NOTE   INPATIENT   Date: 22  Patient Name: Padma Larose       Room:   MRN: [id number]   Account #: [de-identified]    : 1950  (75 y.o.)  Gender: female                 REASON FOR MISSED TREATMENT:  22 She has been performing self care tasks independently and does not have any concerns  with her needs for ADL tasks ar home. No Skilled OT Needs identified at this time. If needs are identified, please reorder therapy    3-31-22 Off unit for Biopsy (DM)   -   OT being discontinued at this time.  Patient functioning at Premorbid Level  No further needs        Rajendra Camacho, OT

## 2022-04-01 NOTE — PROGRESS NOTES
Physical Therapy         Physical Therapy Cancel Note      DATE: 2022    NAME: Padma Larose  MRN: [id number]   : 1950      Patient not seen this date for Physical Therapy due to:    Patient independent with functional mobility. Will defer PT evaluation at this time. Please reorder PT if future needs arise. Upon entering room pt was in bathroom with IV pole Independently. Pt able to ambulate back to bed without difficulty Independently. No PT services needed at this time.       Electronically signed by Sandra Lujan PT on 2022 at 8:35 AM

## 2022-04-01 NOTE — PLAN OF CARE
Problem: Pain:  Goal: Pain level will decrease  Description: Pain level will decrease  4/1/2022 0419 by Qasim Cheney RN  Outcome: Ongoing  3/31/2022 1536 by Mely Youngblood RN  Outcome: Ongoing  Goal: Control of acute pain  Description: Control of acute pain  4/1/2022 0419 by Qasim Cheney RN  Outcome: Ongoing  3/31/2022 1536 by Mely Youngblood RN  Outcome: Ongoing  Goal: Control of chronic pain  Description: Control of chronic pain  4/1/2022 0419 by Qasim Cheney RN  Outcome: Ongoing  3/31/2022 1536 by Mely Youngblood RN  Outcome: Ongoing     Problem: SAFETY  Goal: Free from accidental physical injury  4/1/2022 0419 by Qasim Cheney RN  Outcome: Ongoing  3/31/2022 1536 by Mely Youngblood RN  Outcome: Ongoing  Note: Patient is alert and oriented and able to make needs known. Goal: Free from intentional harm  4/1/2022 0419 by Qasmi Cheney RN  Outcome: Ongoing  3/31/2022 1536 by Mely Youngblood RN  Outcome: Ongoing     Problem: DAILY CARE  Goal: Daily care needs are met  4/1/2022 0419 by Qasim Cheney RN  Outcome: Ongoing  3/31/2022 1536 by Mely Youngblood RN  Outcome: Ongoing     Problem: SKIN INTEGRITY  Goal: Skin integrity is maintained or improved  4/1/2022 0419 by Qasim Cheney RN  Outcome: Ongoing  3/31/2022 1536 by Mely Youngblood RN  Outcome: Ongoing     Problem: KNOWLEDGE DEFICIT  Goal: Patient/S.O. demonstrates understanding of disease process, treatment plan, medications, and discharge instructions.   4/1/2022 0419 by Qasim Cheney RN  Outcome: Ongoing  3/31/2022 1536 by Mely Youngblood RN  Outcome: Ongoing     Problem: DISCHARGE BARRIERS  Goal: Patient's continuum of care needs are met  4/1/2022 0419 by Qasim Cheney RN  Outcome: Ongoing  3/31/2022 1536 by Mely Youngblood RN  Outcome: Ongoing     Problem: Nutrition  Goal: Optimal nutrition therapy  Outcome: Ongoing
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
## 2022-04-01 NOTE — PROGRESS NOTES
The patient is asymptomatic today she has a large retroperitoneal mass. She had a biopsy done and the result of that is pending she was also seen by vascular surgery and hematology oncology. Her medications are reviewed and she is advised to see oncologist in the near future. Today on my examination she was alert and responsive her lungs are clear heart sounds are normal there is no murmur no gallop and epigastric region showed evidence of  Mass.   He is reviewed the discharge summary by the resident which will be cosigned by me

## 2022-04-01 NOTE — DISCHARGE SUMMARY
[address]    Discharge Summary     Patient ID: Jose Cast  :  1950   MRN: [id number]     ACCOUNT:  [de-identified]   Patient's PCP: Grady Berry MD  Admit Date: 3/30/2022   Discharge Date: 2022     Length of Stay: 2  Code Status:  Prior  Admitting Physician: Marnie Carrero MD  Discharge Physician: Sloan Mo MD     Active Discharge Diagnoses:       Primary Problem  Abdominal mass      [place] Problems    Diagnosis Date Noted    Moderate malnutrition ([address] [E44.0] 2022    Abdominal mass [R19.00] 2022    Small bowel ischemia ([facility] [address] [K55.9]     Osteoporosis [M81.0] 2019    Mixed hyperlipidemia [E78.2] 2019    Obesity, Class II, BMI 35-39.9, no comorbidity [E66.9] 2019       Admission Condition:  fair     Discharged Condition: stable    Hospital Stay:       Hospital Course:  Jose Cast is a 70 y.o. female with past medical history of hyperlipidemia. Patient presented to the emergency department with abdominal pain and fullness which had been present for 6 to 8 weeks. This was accompanied by nausea, vomiting, early satiety, and decreased oral intake which worsened over the past month. On admission, patient underwent CTA abdomen and pelvis which showed:  -Large conglomerate soft tissue mass involving the retroperitoneum encasing the abdominal aorta, visceral branches, and renal arteries. Maximal dimension of approximately 10 cm. Findings most compatible with lymphoma which can be confirmed with biopsy.  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 Remainder of the abdominal aortic branches are patent  -1.3 cm distal splenic artery aneurysm at the bifurcation    For further evaluation of the mass, hematology/oncology was consulted and biopsy was performed on 3/31.  Heme/onc recommended follow up outpatient to discuss results of the biopsy once pathology is finalized. Additionally, vascular surgery was consulted for evaluation and recommendations regarding the vascular findings on CTA. There is no plan for stent or surgical intervention at this time, as collaterals were found to be sufficient. Patient was discharged with a supply of Zofran which she may take as needed for her symptoms. Follow up scheduled at 3pm on Monday 4/4 at [facility] with Dr. Odette Betancourt.        Significant therapeutic interventions: n/a    Significant Diagnostic Studies:   Labs / Micro:  CBC:   Lab Results   Component Value Date    WBC 4.8 03/31/2022    RBC 4.38 03/31/2022    HGB 13.2 03/31/2022    HCT 39.2 03/31/2022    MCV 89.7 03/31/2022    MCH 30.2 03/31/2022    MCHC 33.7 03/31/2022    RDW 13.2 03/31/2022     03/31/2022     CMP:    Lab Results   Component Value Date    GLUCOSE 89 03/31/2022     03/31/2022    K 3.8 03/31/2022     03/31/2022    CO2 23 03/31/2022    BUN 11 03/31/2022    CREATININE 0.53 03/31/2022    ANIONGAP 11 03/31/2022    ALKPHOS 84 03/29/2022    ALT 13 03/29/2022    AST 17 03/29/2022    BILITOT 0.36 03/29/2022    LABALBU 4.0 03/29/2022    ALBUMIN NOT REPORTED 02/17/2022    LABGLOM >60 03/31/2022    GFRAA >60 03/31/2022    GFR      03/31/2022    PROT 7.0 03/29/2022    CALCIUM 9.3 03/31/2022     U/A:    Lab Results   Component Value Date    COLORU Yellow 03/29/2022    TURBIDITY Cloudy 03/29/2022    SPECGRAV 1.024 03/29/2022    HGBUR NEGATIVE 03/29/2022    PHUR 5.0 03/29/2022    PROTEINU NEGATIVE 03/29/2022    GLUCOSEU NEGATIVE 03/29/2022    KETUA LARGE 03/29/2022    BILIRUBINUR NEGATIVE 03/29/2022    UROBILINOGEN Normal 03/29/2022    NITRU NEGATIVE 03/29/2022    LEUKOCYTESUR SMALL 03/29/2022       Radiology:    CT CHEST W CONTRAST    Result Date: 3/30/2022  EXAMINATION: CT OF THE CHEST WITH CONTRAST 3/30/2022 5:38 pm TECHNIQUE: CT of the chest was performed with the administration of intravenous contrast. Multiplanar reformatted images are provided for review. Dose modulation, iterative reconstruction, and/or weight based adjustment of the mA/kV was utilized to reduce the radiation dose to as low as reasonably achievable. COMPARISON: 03/29/2022 abdominal CT HISTORY: ORDERING SYSTEM PROVIDED HISTORY: abdominal mass , suspecting lymphoma TECHNOLOGIST PROVIDED HISTORY: abdominal mass , suspecting lymphoma Reason for Exam: abdominal mass , suspecting lymphoma FINDINGS: Mediastinum: Heart size is normal without pericardial effusion. Coronary artery atherosclerosis. Thoracic aortic and main pulmonary artery are normal in caliber. No mediastinal lymphadenopathy. Lungs/pleura: There is no pleural effusion. There is no pneumothorax. There is no pulmonary consolidation. Upper Abdomen: Soft tissue mass in the upper abdomen is partially visualized, better characterized on recent CT abdomen. Soft Tissues/Bones: Degenerative changes in the thoracic spine. 1. No acute intra-abdominal abnormality. No mediastinal lymphadenopathy. 2. Abdominal findings are better characterized on study from 03/29/2022. CTA ABDOMEN PELVIS W CONTRAST    Result Date: 3/29/2022  EXAMINATION: CTA OF THE ABDOMEN AND PELVIS WITH CONTRAST 3/29/2022 1:29 pm: TECHNIQUE: CTA of the abdomen and pelvis was performed with the administration of intravenous contrast. Multiplanar reformatted images are provided for review. MIP images are provided for review. Dose modulation, iterative reconstruction, and/or weight based adjustment of the mA/kV was utilized to reduce the radiation dose to as low as reasonably achievable. COMPARISON: None.  HISTORY: ORDERING SYSTEM PROVIDED HISTORY: Postprandial abdominal pain x1 month TECHNOLOGIST PROVIDED HISTORY: Postprandial abdominal pain x1 month Decision Support Exception - unselect if not a suspected or confirmed emergency medical condition->Emergency Medical Condition (MA) Reason for Exam: Postprandial abdominal pain x1 month FINDINGS: CTA ABDOMEN/PELVIS: Lower Chest: Lower lungs appear clear. Organs: Arterial phase imaging of the liver, spleen, pancreas, adrenal glands, and kidneys within normal limits. Small radiodense gallstone. GI/Bowel: No bowel obstruction. Normal caliber appendix. Colonic diverticulosis. Pelvis: Urinary bladder and uterus unremarkable. Peritoneum/Retroperitoneum: Large conglomerate soft tissue mass involving the retroperitoneum encasing the abdominal aorta, visceral branches, and renal arteries. Maximal dimension of approximately 10 cm. No free air. No free fluid. Vascular: Normal caliber abdominal aorta which is again encased by the retroperitoneal mass. 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 Superior mesenteric artery and its branches are patent. Inferior mesenteric artery is patent. Bilateral renal arteries are patent. 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 Bones/Soft Tissues: Osseous structures appear unremarkable. Large conglomerate soft tissue mass involving the retroperitoneum encasing the abdominal aorta, visceral branches, and renal arteries. Maximal dimension of approximately 10 cm. Findings most compatible with lymphoma which can be be confirmed with biopsy. 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 Remainder of the abdominal aortic branches are patent. 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 CT BIOPSY ABDOMEN RETROPERITONEUM    Result Date: 4/1/2022  PROCEDURE: CT GUIDED CORE BIOPSY RETROPERITONEAL MASS MODERATE CONSCIOUS SEDATION 3/31/2022 HISTORY: ORDERING SYSTEM PROVIDED HISTORY: Retroperitoneal mass. TECHNOLOGIST PROVIDED HISTORY: ct guided core biopsy of retroperitoneal mass.  PHYSICIANS: Nadeem Norman SEDATION: 1 mg versed and 50 mcg fentanyl were titrated intravenously for moderate sedation monitored under my direction. Total intra-service time of sedation was 15 minutes. The patient's vital signs were monitored throughout the procedure and recorded in the patient's medical record by a qualified procedure nurse. TECHNIQUE: Informed consent was obtained after a detailed discussion about the procedure including the risk, benefits, and alternatives. Universal protocol was followed including a time-out to confirm the correct patient and procedure. The patient was positioned prone on the CT table. Axial CT images were obtained through the abdomen and a suitable skin site was prepped and draped in sterile fashion. Local anesthesia was achieved with lidocaine. Under repeat CT guidance, a 17 gauge coaxial needle was advanced into the retroperitoneal mass. Final needle position was confirmed prior to biopsy. Four 18 gauge core biopsy specimens were obtained through the coaxial needle and given to Pathology who was present to help confirm sample adequacy. The coaxial needle was removed and pressure held for hemostasis. A sterile dressing was applied. Final CT was performed. Patient tolerated the procedure well. No immediate complication. Estimated blood loss: Less than 1 mL. Dose modulation, iterative reconstruction, and/or weight based adjustment of the mA/kV was utilized to reduce the radiation dose to as low as reasonably achievable. FINDINGS: CT prior to biopsy demonstrates satisfactory coaxial needle placement with the tip at the periphery of the retroperitoneal mass. Final CT after biopsy demonstrates no evidence of significant bleeding or other complication. Successful CT guided core biopsy of the retroperitoneal mass.          Consultations:    Consults:     Final Specialist Recommendations/Findings:   IP CONSULT TO INTERNAL MEDICINE  IP CONSULT TO SOCIAL WORK  IP CONSULT TO ONCOLOGY  IP CONSULT TO VASCULAR SURGERY      The patient was seen and examined on day of discharge and this discharge summary is in conjunction with any daily progress note from day of discharge. Discharge plan:       Disposition: Home    Physician Follow Up:     Alex Snyder MD  [address] [facility] [address]  [address] [facility] [address] [phone]    Schedule an appointment as soon as possible for a visit in 1 week  [facility] Follow up       Requiring Further Evaluation/Follow Up POST HOSPITALIZATION/Incidental Findings: n/a    Diet: regular diet    Activity: As tolerated    Instructions to Patient: Please follow up with your PCP and the hematologist/oncologist. If you experience worsening of symptoms, please return to the emergency department.      Discharge Medications:      Medication List      START taking these medications    ondansetron 4 MG disintegrating tablet  Commonly known as: ZOFRAN-ODT  Take 1 tablet by mouth every 8 hours as needed for Nausea or Vomiting        CONTINUE taking these medications    Aspirin Low Dose 81 MG EC tablet  Generic drug: aspirin  take 1 tablet by mouth once daily     atorvastatin 20 MG tablet  Commonly known as: LIPITOR  Take 1 tablet by mouth daily take 1 tablet by mouth once daily     denosumab 60 MG/ML Sosy SC injection  Commonly known as: PROLIA  Inject 1 mL into the skin every 6 months     ibuprofen 600 MG tablet  Commonly known as: ADVIL;MOTRIN     MULTI VITAMIN PO     tiZANidine 4 MG tablet  Commonly known as: Zanaflex  Take 1 tablet by mouth nightly     vitamin D 1000 UNIT Tabs tablet  Commonly known as: CHOLECALCIFEROL  Take 1 tablet by mouth daily VITAMIN D3. OK to substitute           Where to Get Your Medications      These medications were sent to [facility], [address], [facility] [facility] [address]    Phone: [phone]   · ondansetron 4 MG disintegrating tablet           Electronically signed by   Yael Cordova MD  4/1/2022  7:10 PM      Thank you  Shelva Cabot, MD for the opportunity to be involved in this patient's care.

## 2022-04-01 NOTE — ACP (ADVANCE CARE PLANNING)
Advance Care Planning     Advance Care Planning Activator (Inpatient)  Conversation Note      Date of ACP Conversation: 2022     Conversation Conducted with: Patient with Decision Making Capacity    ACP Activator: Berta Anderson RN        Health Care Decision Maker:     Current Designated Health Care Decision Maker:     Click here to complete Healthcare Decision Makers including section of the Healthcare Decision Maker Relationship (ie \"Primary\")      Care Preferences    Ventilation: \"If you were in your present state of health and suddenly became very ill and were unable to breathe on your own, what would your preference be about the use of a ventilator (breathing machine) if it were available to you? \"      Would the patient desire the use of ventilator (breathing machine)?: yes    \"If your health worsens and it becomes clear that your chance of recovery is unlikely, what would your preference be about the use of a ventilator (breathing machine) if it were available to you? \"     Would the patient desire the use of ventilator (breathing machine)?: Yes      Resuscitation  \"CPR works best to restart the heart when there is a sudden event, like a heart attack, in someone who is otherwise healthy. Unfortunately, CPR does not typically restart the heart for people who have serious health conditions or who are very sick. \"    \"In the event your heart stopped as a result of an underlying serious health condition, would you want attempts to be made to restart your heart (answer \"yes\" for attempt to resuscitate) or would you prefer a natural death (answer \"no\" for do not attempt to resuscitate)? \" yes       [] Yes   [] No   Educated Patient / Sharon Apgar regarding differences between Advance Directives and portable DNR orders.     Length of ACP Conversation in minutes:      Conversation Outcomes:  [] ACP discussion completed  [] Existing advance directive reviewed with patient; no changes to patient's previously recorded wishes  [] New Advance Directive completed  [] Portable Do Not Rescitate prepared for Provider review and signature  [] POLST/POST/MOLST/MOST prepared for Provider review and signature      Follow-up plan:    [] Schedule follow-up conversation to continue planning  [] Referred individual to Provider for additional questions/concerns   [] Advised patient/agent/surrogate to review completed ACP document and update if needed with changes in condition, patient preferences or care setting    [] This note routed to one or more involved healthcare providers

## 2022-04-04 ENCOUNTER — TELEPHONE (OUTPATIENT)
Dept: ONCOLOGY | Age: 72
End: 2022-04-04

## 2022-04-04 ENCOUNTER — OFFICE VISIT (OUTPATIENT)
Dept: ONCOLOGY | Age: 72
End: 2022-04-04
Payer: MEDICARE

## 2022-04-04 ENCOUNTER — CARE COORDINATION (OUTPATIENT)
Dept: CASE MANAGEMENT | Age: 72
End: 2022-04-04

## 2022-04-04 ENCOUNTER — HOSPITAL ENCOUNTER (OUTPATIENT)
Facility: MEDICAL CENTER | Age: 72
End: 2022-04-04

## 2022-04-04 VITALS
BODY MASS INDEX: 35.24 KG/M2 | TEMPERATURE: 97.5 F | DIASTOLIC BLOOD PRESSURE: 96 MMHG | HEIGHT: 62 IN | SYSTOLIC BLOOD PRESSURE: 147 MMHG | WEIGHT: 191.5 LBS | HEART RATE: 98 BPM | RESPIRATION RATE: 16 BRPM

## 2022-04-04 DIAGNOSIS — Z11.59 ENCOUNTER FOR SCREENING FOR OTHER VIRAL DISEASES: ICD-10-CM

## 2022-04-04 DIAGNOSIS — R68.89 OTHER GENERAL SYMPTOMS AND SIGNS: ICD-10-CM

## 2022-04-04 DIAGNOSIS — C83.33 DIFFUSE LARGE B-CELL LYMPHOMA OF INTRA-ABDOMINAL LYMPH NODES (HCC): ICD-10-CM

## 2022-04-04 DIAGNOSIS — C85.13 B-CELL LYMPHOMA OF INTRA-ABDOMINAL LYMPH NODES, UNSPECIFIED B-CELL LYMPHOMA TYPE (HCC): Primary | ICD-10-CM

## 2022-04-04 DIAGNOSIS — R19.09 ABDOMINAL MASS OF OTHER SITE: Primary | ICD-10-CM

## 2022-04-04 DIAGNOSIS — E44.0 MODERATE MALNUTRITION (HCC): ICD-10-CM

## 2022-04-04 LAB
SURGICAL PATHOLOGY REPORT: NORMAL
SURGICAL PATHOLOGY REPORT: NORMAL

## 2022-04-04 PROCEDURE — 99215 OFFICE O/P EST HI 40 MIN: CPT | Performed by: INTERNAL MEDICINE

## 2022-04-04 PROCEDURE — 1111F DSCHRG MED/CURRENT MED MERGE: CPT | Performed by: FAMILY MEDICINE

## 2022-04-04 PROCEDURE — 1111F DSCHRG MED/CURRENT MED MERGE: CPT | Performed by: INTERNAL MEDICINE

## 2022-04-04 PROCEDURE — 3017F COLORECTAL CA SCREEN DOC REV: CPT | Performed by: INTERNAL MEDICINE

## 2022-04-04 PROCEDURE — 99211 OFF/OP EST MAY X REQ PHY/QHP: CPT | Performed by: INTERNAL MEDICINE

## 2022-04-04 PROCEDURE — 1036F TOBACCO NON-USER: CPT | Performed by: INTERNAL MEDICINE

## 2022-04-04 PROCEDURE — G8399 PT W/DXA RESULTS DOCUMENT: HCPCS | Performed by: INTERNAL MEDICINE

## 2022-04-04 PROCEDURE — G8427 DOCREV CUR MEDS BY ELIG CLIN: HCPCS | Performed by: INTERNAL MEDICINE

## 2022-04-04 PROCEDURE — G8417 CALC BMI ABV UP PARAM F/U: HCPCS | Performed by: INTERNAL MEDICINE

## 2022-04-04 PROCEDURE — 4040F PNEUMOC VAC/ADMIN/RCVD: CPT | Performed by: INTERNAL MEDICINE

## 2022-04-04 PROCEDURE — 1123F ACP DISCUSS/DSCN MKR DOCD: CPT | Performed by: INTERNAL MEDICINE

## 2022-04-04 PROCEDURE — 1090F PRES/ABSN URINE INCON ASSESS: CPT | Performed by: INTERNAL MEDICINE

## 2022-04-04 RX ORDER — HYDROCODONE BITARTRATE AND ACETAMINOPHEN 5; 325 MG/1; MG/1
1 TABLET ORAL EVERY 8 HOURS PRN
Qty: 45 TABLET | Refills: 0 | Status: SHIPPED | OUTPATIENT
Start: 2022-04-04 | End: 2022-04-19
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
## 2022-04-04 NOTE — TELEPHONE ENCOUNTER
MAICO HERE FOR MD VISIT   PORT  & PET BEFORE 4/14/22  LABS W/ ABOVE  RV ONCE PT BACK FROM VACATION W/ C1D1 OF  CHEMO  WILL ORDER CHEMO  PORT PLACEMENT IS ON 4/13/22 @ 10AM AT [address] [place] [address] Drive ARRIVAL @ 8:30AM  PET/CT IS ON 4/8/22 @ 10:30AM IN [facility] ARRIVAL @ 10:15AM  LABS HEPATITIS B SURFACE ANTIGEN, HEPATITIS B CORE ANTIBODY ORDERS GIVEN TO PT ON EXIT TO BE DONE DAY OF PET/CT   NEW ORDER IS PENDING ALMA HERNÁNDEZ/ RONALD SHIRLEY VISIT C1 D1 WHEN PT COMES BACK FROM VACATION  SCRIPTS SENT TO PT PHARMACY  AVS PRINTED W/ INSTRUCTIONS AND GIVEN TO PT ON EXIT

## 2022-04-04 NOTE — CARE COORDINATION
[facility] Transitions Initial Follow Up Call    Call within 2 business days of discharge: Yes    Patient: Padma Larose Patient : 1950    MRN: <[id number]>  Reason for Admission: Abdominal mass    Discharge Date: 22 RARS: Readmission Risk Score: 7.1 ( )      Last Discharge [facility]       Complaint Diagnosis Description Type Department Provider    3/30/22 Other Abdominal mass, unspecified abdominal location ED to Hosp-Admission (Discharged) (ADMITTED) Sherita Gibson MD; Manuel Dickens... First attempt at initial 24 hour CTN call     Spoke with:  Called to speak with patient for initial  transition of care. Left HIPPA compliant voice message with contact information [phone] for a call  Back with an update. Elizabeth Rivera returned CTN call she states she is doing good has constant abdominal pain, denies chest pain, SOB, dizziness, fever, incision to back clean dry intact. Is eating and drinking as tolerated. Normal bowel and bladder elimination. Medications reviewed and taking a directed. Denies current concerns. Agreeable to future calls. Facility: [facility]    Non-face-to-face services provided:  Obtained and reviewed discharge summary and/or continuity of care documents  Transitions of Care Initial Call    Was this an external facility discharge? No     Challenges to be reviewed by the provider   Additional needs identified to be addressed with provider: No  none             Method of communication with provider : none      Advance Care Planning:   Does patient have an Advance Directive: reviewed and needs to be updated, not on file; education provided, not on file, patient declined education, decision maker updated and referral to internal ACP facilitator. Was this a readmission?  No  Patient stated reason for admission: abdominal pain  Patients top risk factors for readmission: lack of knowledge about disease  medication management    Care Transition Nurse (CTN) contacted the patient by telephone to perform post hospital discharge assessment. Verified name and  with patient as identifiers. Provided introduction to self, and explanation of the CTN role. CTN reviewed discharge instructions, medical action plan and red flags with patient who verbalized understanding. Patient given an opportunity to ask questions and does not have any further questions or concerns at this time. Were discharge instructions available to patient? Yes. Reviewed appropriate site of care based on symptoms and resources available to patient including: PCP  Specialist. The patient agrees to contact the PCP office for questions related to their healthcare. Medication reconciliation was performed with patient, who verbalizes understanding of administration of home medications. Advised obtaining a 90-day supply of all daily and as-needed medications. Covid Risk Education     Educated patient about risk for severe COVID-19 due to risk factors according to CDC guidelines. LPN CC reviewed discharge instructions, medical action plan and red flag symptoms with the patient who verbalized understanding. Discussed COVID vaccination status: Yes. Education provided on COVID-19 vaccination as appropriate. Discussed exposure protocols and quarantine with CDC Guidelines. Patient was given an opportunity to verbalize any questions and concerns and agrees to contact LPN CC or health care provider for questions related to their healthcare. Reviewed and educated patient on any new and changed medications related to discharge diagnosis. Was patient discharged with a pulse oximeter? No Discussed and confirmed pulse oximeter discharge instructions and when to notify provider or seek emergency care. LPN CC provided contact information. Plan for follow-up call in 3-5 days based on severity of symptoms and risk factors.   Plan for next call: symptom management-pain      Care Transitions 24 Hour Call Care Transitions Interventions         Follow Up  Future Appointments   Date Time Provider [facility]   4/4/2022  1:00 PM Francisco J Wells MD [address] [place] [address]   6/16/2022  8:00 AM Jyothi Oglesby MD fp praveen Salguero LPN

## 2022-04-04 NOTE — PROGRESS NOTES
Physician Progress Note      Casi Brothers  CSN #:                  [id number]  :                       1950  ADMIT DATE:       3/30/2022 9:24 AM  DISCH DATE:        2022 2:47 PM  RESPONDING  PROVIDER #:        Lakesha Kemp          QUERY TEXT:    Pt admitted with RP mass concerning for Lymphoma. Noted findings of Moderate   Malnutrition during nutritional assessment by the Dietitian on . If possible, please document in progress notes and discharge summary:    The medical record reflects the following:  Risk Factors: Abdominal pain with  nausea and decreased PO intake x1 month. Clinical Indicators: Per comprehensive Nutritional Assessment finding per   Dietitian \" abdominal pain, secondary to large retroperitoneal mass, possibly   lymphoma. Moderate malnutrition in the context of Inadequate oral intake   related to altered GI function as evidenced by intake 0-25%,weight loss   greater than or equal to 5% in 1 month, GI abnormality\"  Treatment: Diet advanced to regular after biopsy, High Calorie/High Protein   Oral Supplement. Options provided:  -- Moderate Malnutrition confirmed present on admission  -- Moderate Malnutrition ruled out  -- Other - I will add my own diagnosis  -- Disagree - Not applicable / Not valid  -- Disagree - Clinically unable to determine / Unknown  -- Refer to Clinical Documentation Reviewer    PROVIDER RESPONSE TEXT:    The diagnosis of Moderate Malnutrition was confirmed as present on admission.     Query created by: Kurt Ahumada on 2022 12:52 AM      Electronically signed by:  Lakesha Kemp 2022 7:30 AM

## 2022-04-04 NOTE — PATIENT INSTRUCTIONS
Port and pet before 4/14   Labs with above   rv once pt back from vacation with c 1 d 1 of chemo     Will order chemo

## 2022-04-05 ENCOUNTER — TELEPHONE (OUTPATIENT)
Dept: ONCOLOGY | Age: 72
End: 2022-04-05

## 2022-04-05 LAB
FLOW CYTOMETRY SOURCE: NORMAL
FLOW CYTOMETRY, NODE/FLUID: NORMAL

## 2022-04-05 NOTE — TELEPHONE ENCOUNTER
NOTE LEFT ON WRITER'S DESK FROM DR Zain Segovia STATING PT NEEDS AN ECHO SCHEDULED FOR [address] BEFORE 04/14/22. ECHO WAS ALREADY SCHEDULED BY [facility] () FOR 04/11/22.

## 2022-04-06 ENCOUNTER — TELEPHONE (OUTPATIENT)
Dept: ONCOLOGY | Age: 72
End: 2022-04-06

## 2022-04-06 NOTE — TELEPHONE ENCOUNTER
NUTRITION NOTE    Consult received from Dr Lico Hoang. Will follow up.     YANET Guan, RD, LD  Registered Dietitian   Formerly named [facility]  [phone]

## 2022-04-08 ENCOUNTER — CARE COORDINATION (OUTPATIENT)
Dept: CASE MANAGEMENT | Age: 72
End: 2022-04-08

## 2022-04-08 ENCOUNTER — HOSPITAL ENCOUNTER (OUTPATIENT)
Dept: NUCLEAR MEDICINE | Age: 72
Discharge: HOME | End: 2022-04-10
Payer: MEDICARE

## 2022-04-08 ENCOUNTER — TELEPHONE (OUTPATIENT)
Dept: INFUSION THERAPY | Facility: MEDICAL CENTER | Age: 72
End: 2022-04-08

## 2022-04-08 DIAGNOSIS — C83.33 DIFFUSE LARGE B-CELL LYMPHOMA OF INTRA-ABDOMINAL LYMPH NODES (HCC): Primary | ICD-10-CM

## 2022-04-08 DIAGNOSIS — C85.13 B-CELL LYMPHOMA OF INTRA-ABDOMINAL LYMPH NODES, UNSPECIFIED B-CELL LYMPHOMA TYPE (HCC): ICD-10-CM

## 2022-04-08 LAB — GLUCOSE BLD-MCNC: 95 MG/DL (ref 65–105)

## 2022-04-08 PROCEDURE — 2580000003 HC RX 258: Performed by: INTERNAL MEDICINE

## 2022-04-08 PROCEDURE — A9552 F18 FDG: HCPCS | Performed by: INTERNAL MEDICINE

## 2022-04-08 PROCEDURE — 82947 ASSAY GLUCOSE BLOOD QUANT: CPT

## 2022-04-08 PROCEDURE — 3430000000 HC RX DIAGNOSTIC RADIOPHARMACEUTICAL: Performed by: INTERNAL MEDICINE

## 2022-04-08 PROCEDURE — 78815 PET IMAGE W/CT SKULL-THIGH: CPT

## 2022-04-08 RX ORDER — SODIUM CHLORIDE 0.9 % (FLUSH) 0.9 %
5-40 SYRINGE (ML) INJECTION 2 TIMES DAILY
Status: DISCONTINUED | OUTPATIENT
Start: 2022-04-08 | End: 2022-04-11 | Stop reason: HOSPADM

## 2022-04-08 RX ORDER — FLUDEOXYGLUCOSE F 18 200 MCI/ML
10 INJECTION, SOLUTION INTRAVENOUS
Status: COMPLETED | OUTPATIENT
Start: 2022-04-08 | End: 2022-04-08

## 2022-04-08 RX ADMIN — FLUDEOXYGLUCOSE F 18 11.88 MILLICURIE: 200 INJECTION, SOLUTION INTRAVENOUS at 10:52

## 2022-04-08 RX ADMIN — SODIUM CHLORIDE, PRESERVATIVE FREE 10 ML: 5 INJECTION INTRAVENOUS at 10:53

## 2022-04-08 NOTE — CARE COORDINATION
[facility]    2022    Patient: Ida Santamaria  Patient : 1950   MRN: <[id number]>  Reason for Admission: abdominal mass  Discharge Date: 22 RARS: Readmission Risk Score: 7.1 ( )         Spoke with pt for a sub care transition call. Pt admitted on 3/30/22 with abd mass, s/p retroperitoneal mass bx on 3/31/22. Pt dx with Diffuse large B-cell lymphoma and is actively following with oncology. Pt had initially presented to the hospital with c/o abd pain/back pain, n/v, decrease oral intake, and early satiety over the past 6-8 weeks. Pt states that she is feeling \"a million times better\" then she did 1 week ago. Pt still c/o ongoing abd and back pain, but reports that pain is much more manageable now. Pt was prescribed Norco by her oncologist and has been taking 1/2 tab TID to help keep pain better controlled. Pt states that nausea is intermittent at this time and that the Zofran helps considerably. Pt states that she has been able to eat more and has gotten her appetite back. Pt had a f/u with onc on 22. Pt ordered to have PET scan to assess full extent of disease (done today), PAC insertion, and OP Echo. Pt is scheduled for the Echo on 22 and for the [facility] insertion on 22. Pt is aware. Noted that RD referral was placed by oncologist and per chart review the RD plans on outreaching to the pt. Pt states that she is keeping a positive attitude. Pt is looking forward to traveling out of town to visit her grandchildren in [place] next week. Pt states that she is leaving next Thursday and will be visiting for a week. Pt did not voice any current needs/concerns at this time. She was agreeable to continued outreaches.     Care Transitions Follow Up Call    Needs to be reviewed by the provider   Additional needs identified to be addressed with provider: No  none             Method of communication with provider : none      Care Transition Nurse (CTN) contacted the patient by telephone to follow up after admission on 3/30/22. Addressed changes since last contact: medications-Norco started and is taking  labs-per oncology  Echo, PET scan, and PAC insertion scheduled    CTN reviewed medical action plan and red flags with patient and discussed any barriers to care and/or understanding of plan of care after discharge. Discussed appropriate site of care based on symptoms and resources available to patient including: PCP  Specialist  Urgent care clinics  When to call 911. The patient agrees to contact the PCP office for questions related to their healthcare. Patients top risk factors for readmission: lack of knowledge about disease  medical condition-lymphoma  medication management  multiple health system providers  polypharmacy  Interventions to address risk factors: Scheduled appointment with PCP-keep scheduled appt, 4/27/22, Scheduled appointment with Bhargavi Garrett scheduled appts as recommended and Reviewed and followed up on pending diagnostic tests and treatments-OP Echo and PAC insertion as above      CTN provided contact information for future needs. Plan for follow-up call in 5-7 days based on severity of symptoms and risk factors. Plan for next call: symptom management-any increased abd pain? is nausea controlled? appetite?  follow up appointment-Has pt completed echo? Care Transitions Subsequent and Final Call    Schedule Follow Up Appointment with PCP: Completed  Subsequent and Final Calls  Do you have any ongoing symptoms?: Yes  Onset of Patient-reported symptoms:  In the past 7 days  Patient-reported symptoms: Abdominal Pain  Interventions for patient-reported symptoms: Notified PCP/Physician  Have your medications changed?: Yes  Patient Reports: Esther Boyer added by oncology  Do you have any questions related to your medications?: No  Do you currently have any active services?: No  Do you have any needs or concerns that I can assist you with?: No  Identified Barriers:

## 2022-04-08 NOTE — TELEPHONE ENCOUNTER
RN check of new chemotherapy orders per Dr. Rosanna Garza. 3/31/22 biopsy - diffuse large B cell lymphoma    21 day cycle RCHOP    Ht = 157.5 cm  Wt = 86.9 kg  BSA = 1.95    Ruxience 375 mg/m2 = 731.25 mg - rounded to 730 mg IV on day 1  Cytoxan 750 mg/m2 = 1462.5 mg - rounded to 1460 mg IV on day 1  Doxorubicin 50 mg/m2 = 97.5 mg - rounded to 98 mg IV on day 1  Vincristine 1.4 mg/m2 = 2.73 mg IV on day 1    Kardex updated and labs to epic; chart to  for processing/scheduling; note to pool for 2nd RN check.

## 2022-04-11 ENCOUNTER — HOSPITAL ENCOUNTER (OUTPATIENT)
Dept: NON INVASIVE DIAGNOSTICS | Age: 72
Discharge: HOME | End: 2022-04-11
Payer: MEDICARE

## 2022-04-11 DIAGNOSIS — R68.89 OTHER GENERAL SYMPTOMS AND SIGNS: ICD-10-CM

## 2022-04-11 DIAGNOSIS — C83.33 DIFFUSE LARGE B-CELL LYMPHOMA OF INTRA-ABDOMINAL LYMPH NODES (HCC): Primary | ICD-10-CM

## 2022-04-11 DIAGNOSIS — C85.13 B-CELL LYMPHOMA OF INTRA-ABDOMINAL LYMPH NODES, UNSPECIFIED B-CELL LYMPHOMA TYPE (HCC): ICD-10-CM

## 2022-04-11 LAB
LV EF: 66 %
LVEF MODALITY: NORMAL

## 2022-04-11 PROCEDURE — 93306 TTE W/DOPPLER COMPLETE: CPT

## 2022-04-11 RX ORDER — SENNA AND DOCUSATE SODIUM 50; 8.6 MG/1; MG/1
1 TABLET, FILM COATED ORAL DAILY PRN
Qty: 20 TABLET | Refills: 0 | Status: SHIPPED | OUTPATIENT
Start: 2022-04-11 | End: 2022-05-11

## 2022-04-12 ENCOUNTER — HOSPITAL ENCOUNTER (OUTPATIENT)
Dept: INFUSION THERAPY | Facility: MEDICAL CENTER | Age: 72
Discharge: HOME | End: 2022-04-12

## 2022-04-12 ENCOUNTER — CARE COORDINATION (OUTPATIENT)
Dept: CASE MANAGEMENT | Age: 72
End: 2022-04-12

## 2022-04-12 DIAGNOSIS — C83.33 DIFFUSE LARGE B-CELL LYMPHOMA OF INTRA-ABDOMINAL LYMPH NODES (HCC): Primary | ICD-10-CM

## 2022-04-12 RX ORDER — LIDOCAINE AND PRILOCAINE 25; 25 MG/G; MG/G
CREAM TOPICAL
Qty: 5 G | Refills: 0 | Status: SHIPPED | OUTPATIENT
Start: 2022-04-12 | End: 2022-04-27 | Stop reason: ALTCHOICE

## 2022-04-12 NOTE — CARE COORDINATION
Jerry 45 Transitions Follow Up Call    2022    Patient: Jose Cast  Patient : 1950   MRN: <[id number]>  Reason for Admission: 3/30/2022 - 2022 [address] mass, s/p retroperitoneal mass bx on 3/31/22. Pt dx with Diffuse large B-cell lymphoma. Discharge Date: 22 RARS: Readmission Risk Score: 7.1 ( )  CT    Subsequent CT outreach attempt leaving [place]  w/ my outreach. Dr Berny Gold  8:15  PCP  8:00    Care Transitions Subsequent and Final Call    Subsequent and Final Calls  Care Transitions Interventions  Other Interventions:            Follow Up  Future Appointments   Date Time Provider Aleida Desir   2022 10:00 AM STC IR  [facility] SPECIAL [facility] Radiolog   2022  8:00 AM STV STA CHAIR 05 [facility] STA MED [place]   2022  8:15 AM Marah Goel MD [facility]   2022  8:00 AM Grady Berry MD fp sc Solange Cintron, RN

## 2022-04-12 NOTE — PROGRESS NOTES
Patient arrived ambulatory with daughter for chemotherapy teaching. Patient is scheduled for port placement on 4/13/22, and C1D1 with R-CHOP on 4/25/22. Chemotherapy education completed including side effects and management, port care and nutrition. [facility] sheets given to patient with education folder and unit phone numbers. Patient also provided with calendar of upcoming visits. Teaching forms completed by patient and to front office for scanning. Per patient, Digna Machuca has already been prescribed for her. Emla cream sent to pharmacy today, education provided on use. Patient and daughter's questions were answered.

## 2022-04-13 ENCOUNTER — HOSPITAL ENCOUNTER (OUTPATIENT)
Dept: INTERVENTIONAL RADIOLOGY/VASCULAR | Age: 72
Discharge: HOME | End: 2022-04-15
Payer: MEDICARE

## 2022-04-13 VITALS
SYSTOLIC BLOOD PRESSURE: 148 MMHG | TEMPERATURE: 95.9 F | HEART RATE: 81 BPM | OXYGEN SATURATION: 98 % | RESPIRATION RATE: 14 BRPM | DIASTOLIC BLOOD PRESSURE: 89 MMHG

## 2022-04-13 DIAGNOSIS — C85.13 B-CELL LYMPHOMA OF INTRA-ABDOMINAL LYMPH NODES, UNSPECIFIED B-CELL LYMPHOMA TYPE (HCC): ICD-10-CM

## 2022-04-13 LAB
INR BLD: 0.9
PARTIAL THROMBOPLASTIN TIME: 32.7 SEC (ref 24–36)
PLATELET # BLD: 271 K/UL (ref 150–450)
PROTHROMBIN TIME: 12.3 SEC (ref 11.8–14.6)

## 2022-04-13 PROCEDURE — 85610 PROTHROMBIN TIME: CPT

## 2022-04-13 PROCEDURE — 36415 COLL VENOUS BLD VENIPUNCTURE: CPT

## 2022-04-13 PROCEDURE — 7100000011 HC PHASE II RECOVERY - ADDTL 15 MIN

## 2022-04-13 PROCEDURE — 77001 FLUOROGUIDE FOR VEIN DEVICE: CPT

## 2022-04-13 PROCEDURE — 76937 US GUIDE VASCULAR ACCESS: CPT

## 2022-04-13 PROCEDURE — 7100000010 HC PHASE II RECOVERY - FIRST 15 MIN

## 2022-04-13 PROCEDURE — 2580000003 HC RX 258: Performed by: RADIOLOGY

## 2022-04-13 PROCEDURE — 85730 THROMBOPLASTIN TIME PARTIAL: CPT

## 2022-04-13 PROCEDURE — 2709999900 HC NON-CHARGEABLE SUPPLY

## 2022-04-13 PROCEDURE — 2709999900 IR PORT PLACEMENT < 5 YEARS

## 2022-04-13 PROCEDURE — 85049 AUTOMATED PLATELET COUNT: CPT

## 2022-04-13 PROCEDURE — 6360000002 HC RX W HCPCS: Performed by: RADIOLOGY

## 2022-04-13 PROCEDURE — 36561 INSERT TUNNELED CV CATH: CPT

## 2022-04-13 RX ORDER — HEPARIN SODIUM (PORCINE) LOCK FLUSH IV SOLN 100 UNIT/ML 100 UNIT/ML
SOLUTION INTRAVENOUS
Status: COMPLETED | OUTPATIENT
Start: 2022-04-13 | End: 2022-04-13

## 2022-04-13 RX ORDER — SODIUM CHLORIDE 9 MG/ML
INJECTION, SOLUTION INTRAVENOUS CONTINUOUS
Status: DISCONTINUED | OUTPATIENT
Start: 2022-04-13 | End: 2022-04-16 | Stop reason: HOSPADM

## 2022-04-13 RX ADMIN — CEFAZOLIN 1000 MG: 1 INJECTION, POWDER, FOR SOLUTION INTRAMUSCULAR; INTRAVENOUS at 10:47

## 2022-04-13 RX ADMIN — SODIUM CHLORIDE, PRESERVATIVE FREE 500 UNITS: 5 INJECTION INTRAVENOUS at 11:28

## 2022-04-13 ASSESSMENT — ENCOUNTER SYMPTOMS
CHEST TIGHTNESS: 0
APNEA: 0
BACK PAIN: 1
SINUS PRESSURE: 0
ABDOMINAL PAIN: 0
SINUS PAIN: 0
VOMITING: 0
TROUBLE SWALLOWING: 0
COUGH: 0
CONSTIPATION: 0
ABDOMINAL DISTENTION: 0
NAUSEA: 0
SORE THROAT: 0
DIARRHEA: 0
WHEEZING: 0
SHORTNESS OF BREATH: 0
RHINORRHEA: 0

## 2022-04-13 ASSESSMENT — PAIN SCALES - GENERAL: PAINLEVEL_OUTOF10: 0
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
## 2022-04-13 NOTE — OP NOTE
Brief Postoperative Note    Nallely Mcdermott  YOB: 1950  [id number]    Pre-operative Diagnosis: lymphoma    Post-operative Diagnosis: Same    Procedure: port    Anesthesia: Local     Surgeons/Assistants: Leeann Davidson MD     Estimated Blood Loss: minimal    Complications: none immediate    Specimens: were not obtained      Electronically signed by Leeann Davidson MD on 4/13/2022 at 11:58 AM

## 2022-04-15 ENCOUNTER — HOSPITAL ENCOUNTER (OUTPATIENT)
Facility: MEDICAL CENTER | Age: 72
End: 2022-04-15
Payer: MEDICARE

## 2022-04-19 ENCOUNTER — CARE COORDINATION (OUTPATIENT)
Dept: CASE MANAGEMENT | Age: 72
End: 2022-04-19

## 2022-04-19 NOTE — CARE COORDINATION
J[facility] 45 Transitions Follow Up Call    2022    Patient: Leandra Moffett  Patient : 1950   MRN: <[id number]>  Reason for Admission: abdominal mass  Discharge Date: 22 RARS: Readmission Risk Score: 7.1 ( )         Spoke with: Briefly with Lady Urban she is in [place] visiting family she states she is doing pretty good denies pain keeps call short but again states she is good nothing to worry about at presernt time. Care Transitions Subsequent and Final Call    Subsequent and Final Calls  Do you have any ongoing symptoms?: No  Have your medications changed?: No  Do you have any questions related to your medications?: No  Do you currently have any active services?: No  Do you have any needs or concerns that I can assist you with?: No  Identified Barriers: Other  Care Transitions Interventions  Other Interventions:            Follow Up  Future Appointments   Date Time Provider Aleida Desir   2022  8:00 AM STV STA CHAIR 05 [facility] STA MED [facility]   2022  8:15 AM Tomasz Fam MD [facility]   2022  8:00 AM Glenroy Blackwell MD fp praveen Toro LPN

## 2022-04-21 ENCOUNTER — TELEPHONE (OUTPATIENT)
Dept: ONCOLOGY | Age: 72
End: 2022-04-21

## 2022-04-21 NOTE — TELEPHONE ENCOUNTER
[facility] Oncology  Initial Nutrition Assessment    Sherita Mcdermott is a 70 y.o.  female     Reason for Evaluation: md referral    Subjective/Current Data:  Called pt on listed phone number r/t referral. Pt reports is having aversions to food but managing the \"best she can. \" Pt states she is eating well, but having difficulty just finding foods that textures/flavors with day-to-day changes. Pt states weight has been maintained and has been able to enjoy traveling with her son. Pt with no further nutrition-related concerns at this time; will follow as requested.     YANET Logan, RD, LD  Registered Dietitian   [facility] CenterdonaldA[facility]  [phone]

## 2022-04-25 ENCOUNTER — HOSPITAL ENCOUNTER (OUTPATIENT)
Dept: INFUSION THERAPY | Facility: MEDICAL CENTER | Age: 72
Discharge: HOME | End: 2022-04-25
Payer: MEDICARE

## 2022-04-25 ENCOUNTER — TELEPHONE (OUTPATIENT)
Dept: ONCOLOGY | Age: 72
End: 2022-04-25

## 2022-04-25 ENCOUNTER — OFFICE VISIT (OUTPATIENT)
Dept: ONCOLOGY | Age: 72
End: 2022-04-25
Payer: MEDICARE

## 2022-04-25 VITALS
HEART RATE: 108 BPM | DIASTOLIC BLOOD PRESSURE: 70 MMHG | WEIGHT: 185.1 LBS | SYSTOLIC BLOOD PRESSURE: 141 MMHG | TEMPERATURE: 97.3 F | BODY MASS INDEX: 33.86 KG/M2 | RESPIRATION RATE: 16 BRPM

## 2022-04-25 VITALS — HEART RATE: 104 BPM | DIASTOLIC BLOOD PRESSURE: 68 MMHG | SYSTOLIC BLOOD PRESSURE: 148 MMHG

## 2022-04-25 DIAGNOSIS — C83.33 DIFFUSE LARGE B-CELL LYMPHOMA OF INTRA-ABDOMINAL LYMPH NODES (HCC): Primary | ICD-10-CM

## 2022-04-25 LAB
ABSOLUTE EOS #: 0.23 K/UL (ref 0–0.44)
ABSOLUTE IMMATURE GRANULOCYTE: 0.03 K/UL (ref 0–0.3)
ABSOLUTE LYMPH #: 0.92 K/UL (ref 1.1–3.7)
ABSOLUTE MONO #: 1.02 K/UL (ref 0.1–1.2)
ALBUMIN SERPL-MCNC: 3.7 G/DL (ref 3.5–5.2)
ALP BLD-CCNC: 78 U/L (ref 35–104)
ALT SERPL-CCNC: 11 U/L (ref 5–33)
ANION GAP SERPL CALCULATED.3IONS-SCNC: 11 MMOL/L (ref 9–17)
AST SERPL-CCNC: 16 U/L
BASOPHILS # BLD: 0 % (ref 0–2)
BASOPHILS ABSOLUTE: <0.03 K/UL (ref 0–0.2)
BILIRUB SERPL-MCNC: 0.46 MG/DL (ref 0.3–1.2)
BUN BLDV-MCNC: 16 MG/DL (ref 8–23)
BUN/CREAT BLD: 14 (ref 9–20)
CALCIUM SERPL-MCNC: 9.1 MG/DL (ref 8.6–10.4)
CHLORIDE BLD-SCNC: 101 MMOL/L (ref 98–107)
CO2: 26 MMOL/L (ref 20–31)
CREAT SERPL-MCNC: 1.16 MG/DL (ref 0.5–0.9)
EOSINOPHILS RELATIVE PERCENT: 3 % (ref 1–4)
GFR AFRICAN AMERICAN: 56 ML/MIN
GFR NON-AFRICAN AMERICAN: 46 ML/MIN
GFR SERPL CREATININE-BSD FRML MDRD: ABNORMAL ML/MIN/{1.73_M2}
GLUCOSE BLD-MCNC: 99 MG/DL (ref 70–99)
HBV SURFACE AB TITR SER: <3.5 MIU/ML
HCT VFR BLD CALC: 38.2 % (ref 36.3–47.1)
HEMOGLOBIN: 12.4 G/DL (ref 11.9–15.1)
HEPATITIS B CORE TOTAL ANTIBODY: NONREACTIVE
HEPATITIS B SURFACE ANTIGEN: NONREACTIVE
IMMATURE GRANULOCYTES: 0 %
LACTATE DEHYDROGENASE: 284 U/L (ref 135–214)
LYMPHOCYTES # BLD: 10 % (ref 24–43)
MCH RBC QN AUTO: 30.7 PG (ref 25.2–33.5)
MCHC RBC AUTO-ENTMCNC: 32.5 G/DL (ref 28.4–34.8)
MCV RBC AUTO: 94.6 FL (ref 82.6–102.9)
MONOCYTES # BLD: 11 % (ref 3–12)
NRBC AUTOMATED: 0 PER 100 WBC
PDW BLD-RTO: 12.5 % (ref 11.8–14.4)
PLATELET # BLD: 261 K/UL (ref 138–453)
PMV BLD AUTO: 10.4 FL (ref 8.1–13.5)
POTASSIUM SERPL-SCNC: 4.1 MMOL/L (ref 3.7–5.3)
RBC # BLD: 4.04 M/UL (ref 3.95–5.11)
SEG NEUTROPHILS: 76 % (ref 36–65)
SEGMENTED NEUTROPHILS ABSOLUTE COUNT: 6.88 K/UL (ref 1.5–8.1)
SODIUM BLD-SCNC: 138 MMOL/L (ref 135–144)
TOTAL PROTEIN: 7.1 G/DL (ref 6.4–8.3)
URIC ACID: 4.1 MG/DL (ref 2.4–5.7)
WBC # BLD: 9.1 K/UL (ref 3.5–11.3)

## 2022-04-25 PROCEDURE — 86704 HEP B CORE ANTIBODY TOTAL: CPT

## 2022-04-25 PROCEDURE — 6360000002 HC RX W HCPCS: Performed by: INTERNAL MEDICINE

## 2022-04-25 PROCEDURE — 1036F TOBACCO NON-USER: CPT | Performed by: INTERNAL MEDICINE

## 2022-04-25 PROCEDURE — 96375 TX/PRO/DX INJ NEW DRUG ADDON: CPT

## 2022-04-25 PROCEDURE — 96411 CHEMO IV PUSH ADDL DRUG: CPT

## 2022-04-25 PROCEDURE — 99215 OFFICE O/P EST HI 40 MIN: CPT | Performed by: INTERNAL MEDICINE

## 2022-04-25 PROCEDURE — 3017F COLORECTAL CA SCREEN DOC REV: CPT | Performed by: INTERNAL MEDICINE

## 2022-04-25 PROCEDURE — G8399 PT W/DXA RESULTS DOCUMENT: HCPCS | Performed by: INTERNAL MEDICINE

## 2022-04-25 PROCEDURE — 36430 TRANSFUSION BLD/BLD COMPNT: CPT

## 2022-04-25 PROCEDURE — 86317 IMMUNOASSAY INFECTIOUS AGENT: CPT

## 2022-04-25 PROCEDURE — 85025 COMPLETE CBC W/AUTO DIFF WBC: CPT

## 2022-04-25 PROCEDURE — 36591 DRAW BLOOD OFF VENOUS DEVICE: CPT

## 2022-04-25 PROCEDURE — G8417 CALC BMI ABV UP PARAM F/U: HCPCS | Performed by: INTERNAL MEDICINE

## 2022-04-25 PROCEDURE — G8427 DOCREV CUR MEDS BY ELIG CLIN: HCPCS | Performed by: INTERNAL MEDICINE

## 2022-04-25 PROCEDURE — 96377 APPLICATON ON-BODY INJECTOR: CPT

## 2022-04-25 PROCEDURE — 80053 COMPREHEN METABOLIC PANEL: CPT

## 2022-04-25 PROCEDURE — 83615 LACTATE (LD) (LDH) ENZYME: CPT

## 2022-04-25 PROCEDURE — 96367 TX/PROPH/DG ADDL SEQ IV INF: CPT

## 2022-04-25 PROCEDURE — 4040F PNEUMOC VAC/ADMIN/RCVD: CPT | Performed by: INTERNAL MEDICINE

## 2022-04-25 PROCEDURE — 2580000003 HC RX 258: Performed by: INTERNAL MEDICINE

## 2022-04-25 PROCEDURE — 84550 ASSAY OF BLOOD/URIC ACID: CPT

## 2022-04-25 PROCEDURE — 96417 CHEMO IV INFUS EACH ADDL SEQ: CPT

## 2022-04-25 PROCEDURE — 96413 CHEMO IV INFUSION 1 HR: CPT

## 2022-04-25 PROCEDURE — 6370000000 HC RX 637 (ALT 250 FOR IP): Performed by: INTERNAL MEDICINE

## 2022-04-25 PROCEDURE — 96415 CHEMO IV INFUSION ADDL HR: CPT

## 2022-04-25 PROCEDURE — 99211 OFF/OP EST MAY X REQ PHY/QHP: CPT | Performed by: INTERNAL MEDICINE

## 2022-04-25 PROCEDURE — 87340 HEPATITIS B SURFACE AG IA: CPT

## 2022-04-25 PROCEDURE — 1111F DSCHRG MED/CURRENT MED MERGE: CPT | Performed by: INTERNAL MEDICINE

## 2022-04-25 PROCEDURE — 1090F PRES/ABSN URINE INCON ASSESS: CPT | Performed by: INTERNAL MEDICINE

## 2022-04-25 PROCEDURE — 1123F ACP DISCUSS/DSCN MKR DOCD: CPT | Performed by: INTERNAL MEDICINE

## 2022-04-25 RX ORDER — ACETAMINOPHEN 325 MG/1
650 TABLET ORAL
Status: CANCELLED | OUTPATIENT
Start: 2022-04-25

## 2022-04-25 RX ORDER — SODIUM CHLORIDE 0.9 % (FLUSH) 0.9 %
5-40 SYRINGE (ML) INJECTION PRN
Status: DISCONTINUED | OUTPATIENT
Start: 2022-04-25 | End: 2022-04-26 | Stop reason: HOSPADM

## 2022-04-25 RX ORDER — DIPHENHYDRAMINE HYDROCHLORIDE 50 MG/ML
25 INJECTION INTRAMUSCULAR; INTRAVENOUS ONCE
Status: COMPLETED | OUTPATIENT
Start: 2022-04-25 | End: 2022-04-25

## 2022-04-25 RX ORDER — DIPHENHYDRAMINE HYDROCHLORIDE 50 MG/ML
50 INJECTION INTRAMUSCULAR; INTRAVENOUS
Status: CANCELLED | OUTPATIENT
Start: 2022-04-25

## 2022-04-25 RX ORDER — DOXORUBICIN HYDROCHLORIDE 2 MG/ML
50 INJECTION, SOLUTION INTRAVENOUS ONCE
Status: COMPLETED | OUTPATIENT
Start: 2022-04-25 | End: 2022-04-25

## 2022-04-25 RX ORDER — HEPARIN SODIUM (PORCINE) LOCK FLUSH IV SOLN 100 UNIT/ML 100 UNIT/ML
500 SOLUTION INTRAVENOUS PRN
Status: DISCONTINUED | OUTPATIENT
Start: 2022-04-25 | End: 2022-04-26 | Stop reason: HOSPADM

## 2022-04-25 RX ORDER — MEPERIDINE HYDROCHLORIDE 50 MG/ML
12.5 INJECTION INTRAMUSCULAR; INTRAVENOUS; SUBCUTANEOUS PRN
Status: CANCELLED | OUTPATIENT
Start: 2022-04-25

## 2022-04-25 RX ORDER — SODIUM CHLORIDE 9 MG/ML
25 INJECTION, SOLUTION INTRAVENOUS PRN
Status: CANCELLED | OUTPATIENT
Start: 2022-04-25

## 2022-04-25 RX ORDER — DEXAMETHASONE SODIUM PHOSPHATE 10 MG/ML
10 INJECTION INTRAMUSCULAR; INTRAVENOUS ONCE
Status: COMPLETED | OUTPATIENT
Start: 2022-04-25 | End: 2022-04-25

## 2022-04-25 RX ORDER — SODIUM CHLORIDE 9 MG/ML
20 INJECTION, SOLUTION INTRAVENOUS ONCE
Status: COMPLETED | OUTPATIENT
Start: 2022-04-25 | End: 2022-04-25

## 2022-04-25 RX ORDER — PALONOSETRON 0.05 MG/ML
0.25 INJECTION, SOLUTION INTRAVENOUS ONCE
Status: COMPLETED | OUTPATIENT
Start: 2022-04-25 | End: 2022-04-25

## 2022-04-25 RX ORDER — FAMOTIDINE 10 MG/ML
20 INJECTION, SOLUTION INTRAVENOUS
Status: CANCELLED | OUTPATIENT
Start: 2022-04-25

## 2022-04-25 RX ORDER — ACETAMINOPHEN 325 MG/1
650 TABLET ORAL ONCE
Status: COMPLETED | OUTPATIENT
Start: 2022-04-25 | End: 2022-04-25

## 2022-04-25 RX ORDER — SODIUM CHLORIDE 9 MG/ML
5-40 INJECTION INTRAVENOUS PRN
Status: CANCELLED | OUTPATIENT
Start: 2022-04-25

## 2022-04-25 RX ORDER — SODIUM CHLORIDE 9 MG/ML
INJECTION, SOLUTION INTRAVENOUS CONTINUOUS
Status: CANCELLED | OUTPATIENT
Start: 2022-04-25

## 2022-04-25 RX ORDER — ONDANSETRON 8 MG/1
8 TABLET, ORALLY DISINTEGRATING ORAL EVERY 8 HOURS PRN
Qty: 30 TABLET | Refills: 3 | Status: SHIPPED | OUTPATIENT
Start: 2022-04-25 | End: 2022-08-08 | Stop reason: SDUPTHER

## 2022-04-25 RX ORDER — ONDANSETRON 2 MG/ML
8 INJECTION INTRAMUSCULAR; INTRAVENOUS
Status: CANCELLED | OUTPATIENT
Start: 2022-04-25

## 2022-04-25 RX ORDER — ALBUTEROL SULFATE 90 UG/1
4 AEROSOL, METERED RESPIRATORY (INHALATION) PRN
Status: CANCELLED | OUTPATIENT
Start: 2022-04-25

## 2022-04-25 RX ADMIN — HEPARIN 500 UNITS: 100 SYRINGE at 15:31

## 2022-04-25 RX ADMIN — DOXORUBICIN HYDROCHLORIDE 98 MG: 2 INJECTION, SOLUTION INTRAVENOUS at 14:15

## 2022-04-25 RX ADMIN — FOSAPREPITANT 150 MG: 150 INJECTION, POWDER, LYOPHILIZED, FOR SOLUTION INTRAVENOUS at 09:38

## 2022-04-25 RX ADMIN — SODIUM CHLORIDE, PRESERVATIVE FREE 10 ML: 5 INJECTION INTRAVENOUS at 15:31

## 2022-04-25 RX ADMIN — DIPHENHYDRAMINE HYDROCHLORIDE 25 MG: 50 INJECTION, SOLUTION INTRAMUSCULAR; INTRAVENOUS at 09:33

## 2022-04-25 RX ADMIN — VINCRISTINE SULFATE 2 MG: 1 INJECTION, SOLUTION INTRAVENOUS at 14:26

## 2022-04-25 RX ADMIN — SODIUM CHLORIDE 700 MG: 9 INJECTION, SOLUTION INTRAVENOUS at 10:24

## 2022-04-25 RX ADMIN — SODIUM CHLORIDE 20 ML/HR: 9 INJECTION, SOLUTION INTRAVENOUS at 09:33

## 2022-04-25 RX ADMIN — ACETAMINOPHEN 650 MG: 325 TABLET ORAL at 09:33

## 2022-04-25 RX ADMIN — PALONOSETRON HYDROCHLORIDE 0.25 MG: 0.25 INJECTION, SOLUTION INTRAVENOUS at 09:33

## 2022-04-25 RX ADMIN — PEGFILGRASTIM 6 MG: KIT SUBCUTANEOUS at 15:32

## 2022-04-25 RX ADMIN — SODIUM CHLORIDE, PRESERVATIVE FREE 10 ML: 5 INJECTION INTRAVENOUS at 08:15

## 2022-04-25 RX ADMIN — CYCLOPHOSPHAMIDE 1460 MG: 200 INJECTION, SOLUTION INTRAVENOUS at 14:42

## 2022-04-25 RX ADMIN — DEXAMETHASONE SODIUM PHOSPHATE 10 MG: 10 INJECTION INTRAMUSCULAR; INTRAVENOUS at 09:33

## 2022-04-25 ASSESSMENT — PAIN SCALES - GENERAL: PAINLEVEL_OUTOF10: 0

## 2022-04-25 NOTE — TELEPHONE ENCOUNTER
Mary Lazo FOR MD VISIT & TX  DR TAYLOR IN TO SEE PATIENT  ORDERS RECEIVED  [address] TODAY  ADD HEP B LABS TO TODAY, ORDERED ON 04/04/22  LABS CDP CMP IN 1 WEEK W/POSSIBLE HYDRATION NEXT WEEK  RV 3 WEEKS  HEPATITIS B LABS WERE DRAWN BY MOUNA OROURKE  RN DRAW CDP CMP & POSSIBLE HYDRATION 05/02/22 @11AM  MD VISIT 05/16/22 @9AM [email or web address]  AVS PRINTED AND GIVEN TO PATIENT WITH INSTRUCTIONS  PATIENT REMAINS IN [address] [place] [address]

## 2022-04-25 NOTE — PATIENT INSTRUCTIONS
tx today   Add hep b labs to today ordered on 4/4  LABS CBC cmp in 1 week with possible hydration next week   rv in 3 weeks
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
## 2022-04-25 NOTE — PROGRESS NOTES
Patient arrive ambulatory with daughter for cycle 1 day 1 treatment and physician visit. Has previously had chemo education. Vitals as charted. Port accessed; specimen sent. Physician met with patient; additional labs sent; ok to proceed with treatment. Patient premedicated. Ruxience infusion begin at 18 ml/hr x 30 minutes; no adverse reaction. (MAR not accepting titrations). Ruxience infusion increased to 36 ml/hr x 30 minutes; no adverse reaction. Ruxience infusion increased to 54 ml/hr x 30 minutes; no adverse reaction. Ruxience infusion increased to 72 ml/hr x 30 minutes; no adverse reaction. Ruxience infusion increased to 90 ml/hr x 30 minutes; no adverse reaction. Ruxience infusion increased to 108 ml/hr x 30 minutes; no adverse reaction. Ruxience infusion increased to 126 ml/hr x 30 minutes; no adverse reaction. Ruxience infusion increased to 144 ml/hr for remainder of infusion; no reaction; line flushed. Adriamycin IVP completed via free flowing normal saline line; blood return present pre/mid/post IVPush; line flushed. Patient taking ice chips while administering med. Vincristine IVP completed via free flowing normal saline line; blood return present pre/mid/post IVPush; line flushed. Cytoxan infused with no sign of adverse reaction; line flushed. Patient and her daughter educated on Neulasta OBI. OBI placed to arm; activated prior to discharge from unit; patient aware when to remove and discard tomorrow. Patient ambulate off unit per self at discharge; AVS per front office staff, daughter returning to pick her mother up.

## 2022-04-27 ENCOUNTER — TELEPHONE (OUTPATIENT)
Dept: INFUSION THERAPY | Facility: MEDICAL CENTER | Age: 72
End: 2022-04-27

## 2022-04-27 ENCOUNTER — OFFICE VISIT (OUTPATIENT)
Dept: FAMILY MEDICINE CLINIC | Age: 72
End: 2022-04-27
Payer: MEDICARE

## 2022-04-27 VITALS
DIASTOLIC BLOOD PRESSURE: 84 MMHG | SYSTOLIC BLOOD PRESSURE: 130 MMHG | TEMPERATURE: 97.2 F | HEIGHT: 62 IN | WEIGHT: 190.5 LBS | BODY MASS INDEX: 35.06 KG/M2 | HEART RATE: 95 BPM | OXYGEN SATURATION: 98 %

## 2022-04-27 DIAGNOSIS — N18.31 STAGE 3A CHRONIC KIDNEY DISEASE (HCC): ICD-10-CM

## 2022-04-27 DIAGNOSIS — E78.2 MIXED HYPERLIPIDEMIA: ICD-10-CM

## 2022-04-27 DIAGNOSIS — Z09 HOSPITAL DISCHARGE FOLLOW-UP: ICD-10-CM

## 2022-04-27 DIAGNOSIS — C83.33 DIFFUSE LARGE B-CELL LYMPHOMA OF INTRA-ABDOMINAL LYMPH NODES (HCC): Primary | ICD-10-CM

## 2022-04-27 DIAGNOSIS — R19.09 ABDOMINAL MASS OF OTHER SITE: ICD-10-CM

## 2022-04-27 DIAGNOSIS — I72.8 SPLENIC ARTERY ANEURYSM (HCC): ICD-10-CM

## 2022-04-27 PROBLEM — N18.30 CHRONIC RENAL DISEASE, STAGE III (HCC): Status: ACTIVE | Noted: 2022-04-27

## 2022-04-27 PROCEDURE — 4040F PNEUMOC VAC/ADMIN/RCVD: CPT | Performed by: FAMILY MEDICINE

## 2022-04-27 PROCEDURE — 1111F DSCHRG MED/CURRENT MED MERGE: CPT | Performed by: FAMILY MEDICINE

## 2022-04-27 PROCEDURE — G8417 CALC BMI ABV UP PARAM F/U: HCPCS | Performed by: FAMILY MEDICINE

## 2022-04-27 PROCEDURE — G8399 PT W/DXA RESULTS DOCUMENT: HCPCS | Performed by: FAMILY MEDICINE

## 2022-04-27 PROCEDURE — 1090F PRES/ABSN URINE INCON ASSESS: CPT | Performed by: FAMILY MEDICINE

## 2022-04-27 PROCEDURE — 3017F COLORECTAL CA SCREEN DOC REV: CPT | Performed by: FAMILY MEDICINE

## 2022-04-27 PROCEDURE — 1123F ACP DISCUSS/DSCN MKR DOCD: CPT | Performed by: FAMILY MEDICINE

## 2022-04-27 PROCEDURE — 99215 OFFICE O/P EST HI 40 MIN: CPT | Performed by: FAMILY MEDICINE

## 2022-04-27 PROCEDURE — G8427 DOCREV CUR MEDS BY ELIG CLIN: HCPCS | Performed by: FAMILY MEDICINE

## 2022-04-27 PROCEDURE — 1036F TOBACCO NON-USER: CPT | Performed by: FAMILY MEDICINE

## 2022-04-27 ASSESSMENT — PATIENT HEALTH QUESTIONNAIRE - PHQ9
2. FEELING DOWN, DEPRESSED OR HOPELESS: 0
SUM OF ALL RESPONSES TO PHQ QUESTIONS 1-9: 0
SUM OF ALL RESPONSES TO PHQ QUESTIONS 1-9: 0
1. LITTLE INTEREST OR PLEASURE IN DOING THINGS: 0
SUM OF ALL RESPONSES TO PHQ QUESTIONS 1-9: 0
SUM OF ALL RESPONSES TO PHQ9 QUESTIONS 1 & 2: 0
SUM OF ALL RESPONSES TO PHQ QUESTIONS 1-9: 0

## 2022-04-27 ASSESSMENT — ENCOUNTER SYMPTOMS
SINUS PRESSURE: 0
CHEST TIGHTNESS: 0
EYE REDNESS: 0
TROUBLE SWALLOWING: 0
SORE THROAT: 0
BACK PAIN: 1
RHINORRHEA: 0
ABDOMINAL PAIN: 1
ABDOMINAL DISTENTION: 0
NAUSEA: 1
DIARRHEA: 0
BLOOD IN STOOL: 0
VOMITING: 0
COUGH: 0
COLOR CHANGE: 0
RECTAL PAIN: 0
STRIDOR: 0
WHEEZING: 0
SHORTNESS OF BREATH: 0
CONSTIPATION: 0

## 2022-04-27 NOTE — PROGRESS NOTES
Post-Discharge Transitional Care  Follow Up      Nallely Mcdermott   YOB: 1950    Date of Office Visit:  4/27/2022  Date of Hospital Admission: 3/30/22  Date of Hospital Discharge: 4/1/22  Risk of hospital readmission (high >=14%. 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)  -Started on chemotherapy by oncologist tolerating well continue labs within normal limits      UT DISCHARGE MEDS RECONCILED W/ CURRENT OUTPATIENT MED LIST  Splenic artery aneurysm ([facility] [address]  Known aneurysm continue to monitor seen by vascular surgeon  -     UT DISCHARGE MEDS RECONCILED W/ CURRENT OUTPATIENT MED LIST  Stage 3a chronic kidney disease ([facility] [address]  -Stable continue to monitor      UT DISCHARGE MEDS RECONCILED W/ CURRENT OUTPATIENT MED LIST  Mixed hyperlipidemia  Uncontrolled continue statins   abdominal mass of other site  Pain is under control continue to follow with oncologist  -     [address] [place] [address] discharge follow-up  -     UT DISCHARGE MEDS RECONCILED W/ CURRENT OUTPATIENT MED LIST      Medical Decision Making: high complexity  Return in about 2 months (around 6/27/2022) for [address] [place] [address]. On this date 4/27/2022 I have spent 50 minutes reviewing previous notes, test results and face to face with the patient discussing the diagnosis and importance of compliance with the treatment plan as well as documenting on the day of the visit.        Subjective:   HPI:  Follow up of Hospital problems/diagnosis(es): Patient is scheduled for hospital follow-up was admitted on 30th March. Patient was seen about 3 weeks before her admission for back pain, and was treated symptomatically. Patient reports pain did not improve and she went to the ER. Patient had CT abdomen done and was diagnosed with abdominal mass which turned out to be lymphoma. Patient was treated symptomatically with pain and had biopsy and flow cytometry done. That showed B-cell lymphoma. Patient started chemotherapy from Monday reports that she is doing well. Currently had blood work is at her baseline. Mild CKD which is stable. Pain is under control. Patient denies any needs at home, reports she is able to handle and she has family around who takes care of her. Patient reports pain is controlled with ibuprofen. Patient also has small splenic artery aneurysm and vascular surgery was consulted. She does not need any intervention. Patient home health needs. Hyperlipidemia uncontrolled was started on the reports he started denies any side effects. Patient functions normal.      Discharge summary:  Nona Ganser is a 70 y.o. female with past medical history of hyperlipidemia. Patient presented to the emergency department with abdominal pain and fullness which had been present for 6 to 8 weeks. This was accompanied by nausea, vomiting, early satiety, and decreased oral intake which worsened over the past month.     On admission, patient underwent CTA abdomen and pelvis which showed:  -Large conglomerate soft tissue mass involving the retroperitoneum encasing the abdominal aorta, visceral branches, and renal arteries. Maximal dimension of approximately 10 cm. Findings most compatible with lymphoma which can be confirmed with biopsy.  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   Remainder of the abdominal aortic branches are patent  -1.3 cm distal splenic artery aneurysm at the bifurcation     For further evaluation of the mass, hematology/oncology was consulted and biopsy was performed on 3/31. Heme/onc recommended follow up outpatient to discuss results of the biopsy once pathology is finalized. Additionally, vascular surgery was consulted for evaluation and recommendations regarding the vascular findings on CTA. There is no plan for stent or surgical intervention at this time, as collaterals were found to be sufficient.      Patient was discharged with a supply of Zofran which she may take as needed for her symptoms. Follow up scheduled at 3pm on Monday 4/4 [facility] with Dr. Janes Barriga is a 70 y.o. female with past medical history of hyperlipidemia. Patient presented to the emergency department with abdominal pain and fullness which had been present for 6 to 8 weeks. This was accompanied by nausea, vomiting, early satiety, and decreased oral intake which worsened over the past month.     On admission, patient underwent CTA abdomen and pelvis which showed:  -Large conglomerate soft tissue mass involving the retroperitoneum encasing the abdominal aorta, visceral branches, and renal arteries. Maximal dimension of approximately 10 cm. Findings most compatible with lymphoma which can be confirmed with biopsy.  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 Remainder of the abdominal aortic branches are patent  -1.3 cm distal splenic artery aneurysm at the bifurcation     For further evaluation of the mass, hematology/oncology was consulted and biopsy was performed on 3/31. Heme/onc recommended follow up outpatient to discuss results of the biopsy once pathology is finalized. Additionally, vascular surgery was consulted for evaluation and recommendations regarding the vascular findings on CTA.  There is no plan for stent or surgical intervention at this time, as collaterals were found to be sufficient.      Patient was discharged with a supply of Zofran which she may take as needed for her symptoms. Follow up scheduled at 3pm on Monday 4/4 at [facility] with Dr. Reji Abraham.          PET scan   Patient's large retroperitoneal mass with encasement of the mesenteric   vessels severely hypermetabolic with SUV maximum of 16.4.  The mass extends   from the level of the upper pancreas distally to the aortic bifurcation. Findings compatible with neoplasia with pattern favoring lymphoma.       2.  No abnormal FDG uptake noted in the chest or neck.       3.  No hypermetabolic FDG uptake along the iliac umang chain. Inpatient course: Discharge summary reviewed- see chart. Normal left ventricle size. Hyperdynamic left ventricular function. Calculated EF via Harris's method 66% with global L. strain of -19%. Moderate left ventricular hypertrophy. No segmental wall motion abnormalities seen. Normal right ventricular size and function. Mild mitral regurgitation. Mild tricuspid regurgitation. Normal right ventricular systolic pressure. No significant pericardial effusion is seen. 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 If you have any questions, ask your nurse or doctor.             CHANGE how you take these medications    ondansetron 8 MG Tbdp disintegrating tablet  Commonly known as: Zofran ODT  Take 1 tablet by mouth every 8 hours as needed for Nausea or Vomiting  What changed: Another medication with the same name was removed. Continue taking this medication, and follow the directions you see here. Changed by: Simon Cohn MD        CONTINUE taking these medications    atorvastatin 20 MG tablet  Commonly known as: LIPITOR  Take 1 tablet by mouth daily take 1 tablet by mouth once daily     MULTI VITAMIN PO     sennosides-docusate sodium 8.6-50 MG tablet  Commonly known as: SENOKOT-S  Take 1 tablet by mouth daily as needed for Constipation     vitamin D 1000 UNIT Tabs tablet  Commonly known as: CHOLECALCIFEROL  Take 1 tablet by mouth daily VITAMIN D3. OK to substitute        STOP taking these medications    Aspirin Low Dose 81 MG EC tablet  Generic drug: aspirin  Stopped by: Simon Cohn MD     lidocaine-prilocaine 2.5-2.5 % cream  Commonly known as: EMLA  Stopped by: Simon Cohn MD              Medications marked \"taking\" at this time  Outpatient Medications Marked as Taking for the 4/27/22 encounter (Office Visit) with Simon Cohn MD   Medication Sig Dispense Refill    ondansetron (ZOFRAN ODT) 8 MG TBDP disintegrating tablet Take 1 tablet by mouth every 8 hours as needed for Nausea or Vomiting 30 tablet 3    sennosides-docusate sodium (SENOKOT-S) 8.6-50 MG tablet Take 1 tablet by mouth daily as needed for Constipation 20 tablet 0    atorvastatin (LIPITOR) 20 MG tablet Take 1 tablet by mouth daily take 1 tablet by mouth once daily 90 tablet 3    vitamin D (CHOLECALCIFEROL) 1000 UNIT TABS tablet Take 1 tablet by mouth daily VITAMIN D3. OK to substitute 90 tablet 3    Multiple Vitamin (MULTI VITAMIN PO) Take by mouth          Medications patient taking as of now reconciled against medications ordered at time of hospital discharge: Yes    Review of Systems   Constitutional: Positive for activity change. Negative for appetite change, fatigue and fever.    HENT: Negative for congestion, ear pain, postnasal drip, rhinorrhea, sinus pressure, sore throat and trouble swallowing. Eyes: Negative for redness and visual disturbance. Respiratory: Negative for cough, chest tightness, shortness of breath, wheezing and stridor. Cardiovascular: Negative for chest pain, palpitations and leg swelling. Gastrointestinal: Positive for abdominal pain and nausea. Negative for abdominal distention, blood in stool, constipation, diarrhea, rectal pain and vomiting. Endocrine: Negative for polydipsia, polyphagia and polyuria. Genitourinary: Negative for difficulty urinating, flank pain, frequency and urgency. Musculoskeletal: Positive for arthralgias and back pain. Negative for gait problem, myalgias and neck pain. Skin: Negative for color change, rash and wound. Allergic/Immunologic: Negative for food allergies and immunocompromised state. Neurological: Negative for dizziness, speech difficulty, weakness, light-headedness, numbness and headaches. Psychiatric/Behavioral: Negative for agitation, behavioral problems, decreased concentration, dysphoric mood, hallucinations, sleep disturbance and suicidal ideas. The patient is nervous/anxious. Objective:    /84   Pulse 95   Temp 97.2 °F (36.2 °C)   Ht 5' 2\" (1.575 m)   Wt 190 lb 8 oz (86.4 kg)   SpO2 98%   BMI 34.84 kg/m²     Physical Exam  Vitals and nursing note reviewed. Constitutional:       General: She is not in acute distress. Appearance: Normal appearance. She is well-developed. She is obese. She is not diaphoretic. HENT:      Head: Normocephalic and atraumatic. Left Ear: Tympanic membrane normal.      Nose: Nose normal.   Eyes:      General:         Right eye: No discharge. Left eye: No discharge. Extraocular Movements: Extraocular movements intact. Conjunctiva/sclera: Conjunctivae normal.      Pupils: Pupils are equal, round, and reactive to light. Neck:      Thyroid: No thyromegaly.    Cardiovascular:      Rate and Rhythm: Normal rate and regular rhythm. Heart sounds: Normal heart sounds. No murmur heard. Pulmonary:      Effort: Pulmonary effort is normal. No respiratory distress. Breath sounds: Normal breath sounds. No wheezing or rhonchi. Abdominal:      General: Bowel sounds are normal. There is distension. Palpations: Abdomen is soft. There is no mass. Tenderness: There is abdominal tenderness. There is no right CVA tenderness, left CVA tenderness, guarding or rebound. Musculoskeletal:         General: No tenderness. Cervical back: Normal range of motion and neck supple. Spasms present. No rigidity or tenderness. Normal range of motion. Thoracic back: Spasms present. No deformity. Normal range of motion. Lumbar back: Spasms present. No deformity, tenderness or bony tenderness. Decreased range of motion. Lymphadenopathy:      Cervical: No cervical adenopathy. Skin:     Coloration: Skin is not jaundiced or pale. Findings: No bruising, erythema or rash. Neurological:      General: No focal deficit present. Mental Status: She is alert and oriented to person, place, and time. Cranial Nerves: No cranial nerve deficit. Sensory: No sensory deficit. Motor: No weakness or tremor. Coordination: Coordination normal.      Gait: Gait and tandem walk normal.      Deep Tendon Reflexes: Reflexes are normal and symmetric. Psychiatric:         Attention and Perception: Attention and perception normal. She is attentive. Mood and Affect: Mood is anxious. Mood is not depressed. Affect is not tearful. Speech: She is communicative. Speech is not rapid and pressured, delayed or slurred. Behavior: Behavior normal. Behavior is not agitated, slowed, aggressive or withdrawn. Thought Content: Thought content normal.         Judgment: Judgment normal.           An electronic signature was used to authenticate this note.   --Izaiah Jean, MD

## 2022-04-27 NOTE — TELEPHONE ENCOUNTER
New Order 4/25/22  Dr. Mariela Dover 6 mg  Order noted and chart to front for processing;Kardex updated.

## 2022-04-27 NOTE — PROGRESS NOTES
Visit Information    Have you changed or started any medications since your last visit including any over-the-counter medicines, vitamins, or herbal medicines? no   Have you stopped taking any of your medications? Is so, why? -  yes - asprin  Are you having any side effects from any of your medications? - no    Have you seen any other physician or provider since your last visit? yes    Have you had any other diagnostic tests since your last visit? yes   Have you been seen in the emergency room and/or had an admission in a hospital since we last saw you?  yes    Have you had your routine dental cleaning in the past 6 months?  no     Do you have an active MyChart account? If no, what is the barrier?   Yes    Patient Care Team:  Jhony Bautista MD as PCP - General (Family Medicine)  Jhony Bautista MD as PCP - Elva Mckinney Provider  Javier Heard RD, LD as Dietitian (Dietitian)    Medical History Review  Past Medical, Family, and Social History reviewed and does contribute to the patient presenting condition    Health Maintenance   Topic Date Due    Shingles Vaccine (1 of 2) Never done   [employer] Visit (AWV)  12/30/2021    COVID-19 Vaccine (4 - Booster for Velasquez Peter series) 03/08/2022    Depression Screen  02/15/2023    Lipids  02/17/2023    Colorectal Cancer Screen  10/28/2023    Breast cancer screen  02/21/2024    DTaP/Tdap/Td vaccine (3 - Td or Tdap) 02/08/2030    DEXA (modify frequency per FRAX score)  Completed    Flu vaccine  Completed    Pneumococcal 65+ years Vaccine  Completed    Hepatitis C screen  Completed    Hepatitis A vaccine  Aged Out    Hepatitis B vaccine  Aged Out    Hib vaccine  Aged Out    Meningococcal (ACWY) vaccine  Aged Out

## 2022-04-29 ENCOUNTER — HOSPITAL ENCOUNTER (OUTPATIENT)
Facility: MEDICAL CENTER | Age: 72
End: 2022-04-29
Payer: MEDICARE

## 2022-05-02 ENCOUNTER — HOSPITAL ENCOUNTER (OUTPATIENT)
Dept: INFUSION THERAPY | Facility: MEDICAL CENTER | Age: 72
Discharge: HOME | End: 2022-05-02
Payer: MEDICARE

## 2022-05-02 VITALS
DIASTOLIC BLOOD PRESSURE: 85 MMHG | TEMPERATURE: 98 F | RESPIRATION RATE: 16 BRPM | SYSTOLIC BLOOD PRESSURE: 125 MMHG | HEART RATE: 88 BPM

## 2022-05-02 DIAGNOSIS — C83.33 DIFFUSE LARGE B-CELL LYMPHOMA OF INTRA-ABDOMINAL LYMPH NODES (HCC): Primary | ICD-10-CM

## 2022-05-02 DIAGNOSIS — C83.33 DIFFUSE LARGE B-CELL LYMPHOMA OF INTRA-ABDOMINAL LYMPH NODES (HCC): ICD-10-CM

## 2022-05-02 LAB
ABSOLUTE EOS #: 0.06 K/UL (ref 0–0.4)
ABSOLUTE IMMATURE GRANULOCYTE: 0 K/UL (ref 0–0.3)
ABSOLUTE LYMPH #: 0.18 K/UL (ref 1–4.8)
ABSOLUTE MONO #: 0.03 K/UL (ref 0.2–0.8)
ALBUMIN SERPL-MCNC: 3.6 G/DL (ref 3.5–5.2)
ALP BLD-CCNC: 89 U/L (ref 35–104)
ALT SERPL-CCNC: 8 U/L (ref 5–33)
ANION GAP SERPL CALCULATED.3IONS-SCNC: 10 MMOL/L (ref 9–17)
AST SERPL-CCNC: 11 U/L
BASOPHILS # BLD: 0 %
BASOPHILS ABSOLUTE: 0 K/UL (ref 0–0.2)
BILIRUB SERPL-MCNC: 0.53 MG/DL (ref 0.3–1.2)
BUN BLDV-MCNC: 15 MG/DL (ref 8–23)
BUN/CREAT BLD: 23 (ref 9–20)
CALCIUM SERPL-MCNC: 9.3 MG/DL (ref 8.6–10.4)
CHLORIDE BLD-SCNC: 99 MMOL/L (ref 98–107)
CO2: 27 MMOL/L (ref 20–31)
CREAT SERPL-MCNC: 0.64 MG/DL (ref 0.5–0.9)
EOSINOPHILS RELATIVE PERCENT: 20 % (ref 1–4)
GFR AFRICAN AMERICAN: >60 ML/MIN
GFR NON-AFRICAN AMERICAN: >60 ML/MIN
GFR SERPL CREATININE-BSD FRML MDRD: ABNORMAL ML/MIN/{1.73_M2}
GLUCOSE BLD-MCNC: 139 MG/DL (ref 70–99)
HCT VFR BLD CALC: 35.1 % (ref 36.3–47.1)
HEMOGLOBIN: 11.6 G/DL (ref 11.9–15.1)
IMMATURE GRANULOCYTES: 0 %
LYMPHOCYTES # BLD: 60 % (ref 24–44)
MCH RBC QN AUTO: 30.4 PG (ref 25.2–33.5)
MCHC RBC AUTO-ENTMCNC: 33 G/DL (ref 28.4–34.8)
MCV RBC AUTO: 92.1 FL (ref 82.6–102.9)
MONOCYTES # BLD: 10 % (ref 1–7)
NRBC AUTOMATED: 0 PER 100 WBC
PDW BLD-RTO: 11.9 % (ref 11.8–14.4)
PLATELET # BLD: 118 K/UL (ref 138–453)
PMV BLD AUTO: 12.2 FL (ref 8.1–13.5)
POTASSIUM SERPL-SCNC: 3.9 MMOL/L (ref 3.7–5.3)
RBC # BLD: 3.81 M/UL (ref 3.95–5.11)
REASON FOR REJECTION: NORMAL
SEG NEUTROPHILS: 10 % (ref 36–66)
SEGMENTED NEUTROPHILS ABSOLUTE COUNT: 0.03 K/UL (ref 1.8–7.7)
SODIUM BLD-SCNC: 136 MMOL/L (ref 135–144)
TOTAL PROTEIN: 6.2 G/DL (ref 6.4–8.3)
WBC # BLD: 0.3 K/UL (ref 3.5–11.3)
ZZ NTE CLEAN UP: ORDERED TEST: NORMAL
ZZ NTE WITH NAME CLEAN UP: SPECIMEN SOURCE: NORMAL

## 2022-05-02 PROCEDURE — 85025 COMPLETE CBC W/AUTO DIFF WBC: CPT

## 2022-05-02 PROCEDURE — 96360 HYDRATION IV INFUSION INIT: CPT

## 2022-05-02 PROCEDURE — 96365 THER/PROPH/DIAG IV INF INIT: CPT

## 2022-05-02 PROCEDURE — 2580000003 HC RX 258: Performed by: INTERNAL MEDICINE

## 2022-05-02 PROCEDURE — 6360000002 HC RX W HCPCS: Performed by: INTERNAL MEDICINE

## 2022-05-02 PROCEDURE — 36591 DRAW BLOOD OFF VENOUS DEVICE: CPT

## 2022-05-02 PROCEDURE — 80053 COMPREHEN METABOLIC PANEL: CPT

## 2022-05-02 RX ORDER — SODIUM CHLORIDE 0.9 % (FLUSH) 0.9 %
5-40 SYRINGE (ML) INJECTION PRN
Status: DISCONTINUED | OUTPATIENT
Start: 2022-05-02 | End: 2022-05-03 | Stop reason: HOSPADM

## 2022-05-02 RX ORDER — OMEPRAZOLE 20 MG/1
20 CAPSULE, DELAYED RELEASE ORAL
Qty: 60 CAPSULE | Refills: 2 | Status: SHIPPED | OUTPATIENT
Start: 2022-05-02 | End: 2022-07-22

## 2022-05-02 RX ORDER — HEPARIN SODIUM (PORCINE) LOCK FLUSH IV SOLN 100 UNIT/ML 100 UNIT/ML
500 SOLUTION INTRAVENOUS PRN
Status: DISCONTINUED | OUTPATIENT
Start: 2022-05-02 | End: 2022-05-03 | Stop reason: HOSPADM

## 2022-05-02 RX ORDER — 0.9 % SODIUM CHLORIDE 0.9 %
500 INTRAVENOUS SOLUTION INTRAVENOUS ONCE
Status: COMPLETED | OUTPATIENT
Start: 2022-05-02 | End: 2022-05-02

## 2022-05-02 RX ADMIN — HEPARIN 500 UNITS: 100 SYRINGE at 12:57

## 2022-05-02 RX ADMIN — SODIUM CHLORIDE, PRESERVATIVE FREE 10 ML: 5 INJECTION INTRAVENOUS at 12:57

## 2022-05-02 RX ADMIN — SODIUM CHLORIDE 500 ML: 9 INJECTION, SOLUTION INTRAVENOUS at 12:15

## 2022-05-02 NOTE — FLOWSHEET NOTE
Writer encountered and visited briefly with Patient and Daughter while rounding the infusion clinic. Patient denied having any needs for a spiritual care visit. Pt expressed gratitude for the support of her daughter. Pt and Daughter affirmed that they like to laugh together. Writer affirmed Pt and Daughter and offered them words of support and encouragement. Pt and Daughter expressed thanks. 05/02/22 1235   Encounter Summary   Encounter Overview/Reason  Initial Encounter   Service Provided For: Patient and family together   Referral/Consult From: [facility] Family members; Children   Begin Time 1200   End Time  1205   Total Time Calculated 5 min   Encounter    Type Initial Screen/Assessment   Spiritual/Emotional needs   Type Spiritual Support   Assessment/Intervention/Outcome   Assessment Calm;Coping   Intervention Explored/Affirmed feelings, thoughts, concerns;Explored Coping Skills/Resources;Sustaining Presence/Ministry of presence   Outcome Coping;Engaged in conversation;Expressed Gratitude   Plan and Referrals   Plan/Referrals Continue Support (comment)     Electronically signed by Markell Lang, Oncology Outpatient [facility] [address] [facility]  5/2/2022  12:38 PM

## 2022-05-02 NOTE — PROGRESS NOTES
Patient arrived for labs and possible hydration. Pt states that she has had nausea , some vommoting , increased acid indigestion. and fatigue . Pt denies any pother concerns or complaints   Port accessed , specimen sent   Vitals obtained . Labs reviewed   Inform MD of about concerns and labs, orders received to give 500 ml of NS and call in a Omeprazole script . Script sent   Hydration hung , pt tolerated it well . Port flushed and heparinized with intact horne needle removed per protocol. Patient discharge.

## 2022-05-03 DIAGNOSIS — C85.13 B-CELL LYMPHOMA OF INTRA-ABDOMINAL LYMPH NODES, UNSPECIFIED B-CELL LYMPHOMA TYPE (HCC): ICD-10-CM

## 2022-05-04 DIAGNOSIS — C83.33 DIFFUSE LARGE B-CELL LYMPHOMA OF INTRA-ABDOMINAL LYMPH NODES (HCC): Primary | ICD-10-CM

## 2022-05-04 RX ORDER — HYDROCODONE BITARTRATE AND ACETAMINOPHEN 5; 325 MG/1; MG/1
1 TABLET ORAL EVERY 8 HOURS PRN
Qty: 45 TABLET | Refills: 0 | Status: SHIPPED | OUTPATIENT
Start: 2022-05-04 | End: 2022-06-06 | Stop reason: SDUPTHER

## 2022-05-06 RX ORDER — HYDROCODONE BITARTRATE AND ACETAMINOPHEN 5; 325 MG/1; MG/1
TABLET ORAL
Qty: 45 TABLET | OUTPATIENT
Start: 2022-05-06

## 2022-05-09 ENCOUNTER — TELEPHONE (OUTPATIENT)
Dept: INFUSION THERAPY | Age: 72
End: 2022-05-09

## 2022-05-09 NOTE — TELEPHONE ENCOUNTER
[facility]'s Oncology Nutrition Note    PG-SGA screening tool reviewed with score of 13. Pt reports unintentional weight loss, nausea, altered taste acuity, smells bother me, dry mouth, early satiety, pain, consuming less than normal; little solid food and feeling not my normal self, but able to be up and about with fairly normal activities. Full nutrition assessment for scores > 4. Note: RD already following.     YANET Charles, RD, LD  Registered Dietitian   [facility]  [phone]

## 2022-05-12 ENCOUNTER — HOSPITAL ENCOUNTER (OUTPATIENT)
Facility: MEDICAL CENTER | Age: 72
End: 2022-05-12
Payer: MEDICARE

## 2022-05-16 ENCOUNTER — TELEPHONE (OUTPATIENT)
Dept: ONCOLOGY | Facility: CLINIC | Age: 72
End: 2022-05-16

## 2022-05-16 ENCOUNTER — HOSPITAL ENCOUNTER (OUTPATIENT)
Dept: INFUSION THERAPY | Facility: MEDICAL CENTER | Age: 72
Discharge: HOME | End: 2022-05-16
Payer: MEDICARE

## 2022-05-16 ENCOUNTER — TELEPHONE (OUTPATIENT)
Dept: ONCOLOGY | Age: 72
End: 2022-05-16

## 2022-05-16 ENCOUNTER — OFFICE VISIT (OUTPATIENT)
Dept: ONCOLOGY | Age: 72
End: 2022-05-16
Payer: MEDICARE

## 2022-05-16 VITALS
HEART RATE: 93 BPM | WEIGHT: 179 LBS | TEMPERATURE: 97.7 F | RESPIRATION RATE: 16 BRPM | DIASTOLIC BLOOD PRESSURE: 84 MMHG | SYSTOLIC BLOOD PRESSURE: 160 MMHG | BODY MASS INDEX: 32.74 KG/M2

## 2022-05-16 DIAGNOSIS — E44.0 MODERATE MALNUTRITION (HCC): ICD-10-CM

## 2022-05-16 DIAGNOSIS — R10.9 ABDOMINAL PAIN, UNSPECIFIED ABDOMINAL LOCATION: ICD-10-CM

## 2022-05-16 DIAGNOSIS — R63.4 WEIGHT LOSS: ICD-10-CM

## 2022-05-16 DIAGNOSIS — C83.33 DIFFUSE LARGE B-CELL LYMPHOMA OF INTRA-ABDOMINAL LYMPH NODES (HCC): Primary | ICD-10-CM

## 2022-05-16 DIAGNOSIS — Z51.11 ENCOUNTER FOR CHEMOTHERAPY MANAGEMENT: ICD-10-CM

## 2022-05-16 LAB
ABSOLUTE EOS #: 0.04 K/UL (ref 0–0.44)
ABSOLUTE IMMATURE GRANULOCYTE: 0.12 K/UL (ref 0–0.3)
ABSOLUTE LYMPH #: 0.96 K/UL (ref 1.1–3.7)
ABSOLUTE MONO #: 1.32 K/UL (ref 0.1–1.2)
ALBUMIN SERPL-MCNC: 3.8 G/DL (ref 3.5–5.2)
ALP BLD-CCNC: 98 U/L (ref 35–104)
ALT SERPL-CCNC: 16 U/L (ref 5–33)
ANION GAP SERPL CALCULATED.3IONS-SCNC: 11 MMOL/L (ref 9–17)
AST SERPL-CCNC: 19 U/L
BASOPHILS # BLD: 1 % (ref 0–2)
BASOPHILS ABSOLUTE: 0.07 K/UL (ref 0–0.2)
BILIRUB SERPL-MCNC: 0.16 MG/DL (ref 0.3–1.2)
BUN BLDV-MCNC: 7 MG/DL (ref 8–23)
BUN/CREAT BLD: 13 (ref 9–20)
CALCIUM SERPL-MCNC: 9.5 MG/DL (ref 8.6–10.4)
CHLORIDE BLD-SCNC: 107 MMOL/L (ref 98–107)
CO2: 25 MMOL/L (ref 20–31)
CREAT SERPL-MCNC: 0.52 MG/DL (ref 0.5–0.9)
EOSINOPHILS RELATIVE PERCENT: 1 % (ref 1–4)
GFR AFRICAN AMERICAN: >60 ML/MIN
GFR NON-AFRICAN AMERICAN: >60 ML/MIN
GFR SERPL CREATININE-BSD FRML MDRD: ABNORMAL ML/MIN/{1.73_M2}
GLUCOSE BLD-MCNC: 88 MG/DL (ref 70–99)
HCT VFR BLD CALC: 36.6 % (ref 36.3–47.1)
HEMOGLOBIN: 11.8 G/DL (ref 11.9–15.1)
IMMATURE GRANULOCYTES: 2 %
LYMPHOCYTES # BLD: 15 % (ref 24–43)
MCH RBC QN AUTO: 30.5 PG (ref 25.2–33.5)
MCHC RBC AUTO-ENTMCNC: 32.2 G/DL (ref 28.4–34.8)
MCV RBC AUTO: 94.6 FL (ref 82.6–102.9)
MONOCYTES # BLD: 20 % (ref 3–12)
NRBC AUTOMATED: 0 PER 100 WBC
PDW BLD-RTO: 13.9 % (ref 11.8–14.4)
PLATELET # BLD: 594 K/UL (ref 138–453)
PMV BLD AUTO: 9.3 FL (ref 8.1–13.5)
POTASSIUM SERPL-SCNC: 3.8 MMOL/L (ref 3.7–5.3)
RBC # BLD: 3.87 M/UL (ref 3.95–5.11)
SEG NEUTROPHILS: 61 % (ref 36–65)
SEGMENTED NEUTROPHILS ABSOLUTE COUNT: 4.08 K/UL (ref 1.5–8.1)
SODIUM BLD-SCNC: 143 MMOL/L (ref 135–144)
TOTAL PROTEIN: 6.7 G/DL (ref 6.4–8.3)
WBC # BLD: 6.6 K/UL (ref 3.5–11.3)

## 2022-05-16 PROCEDURE — 3017F COLORECTAL CA SCREEN DOC REV: CPT | Performed by: INTERNAL MEDICINE

## 2022-05-16 PROCEDURE — 96409 CHEMO IV PUSH SNGL DRUG: CPT

## 2022-05-16 PROCEDURE — 96413 CHEMO IV INFUSION 1 HR: CPT

## 2022-05-16 PROCEDURE — 96375 TX/PRO/DX INJ NEW DRUG ADDON: CPT

## 2022-05-16 PROCEDURE — 80053 COMPREHEN METABOLIC PANEL: CPT

## 2022-05-16 PROCEDURE — 1123F ACP DISCUSS/DSCN MKR DOCD: CPT | Performed by: INTERNAL MEDICINE

## 2022-05-16 PROCEDURE — 1036F TOBACCO NON-USER: CPT | Performed by: INTERNAL MEDICINE

## 2022-05-16 PROCEDURE — 6360000002 HC RX W HCPCS: Performed by: INTERNAL MEDICINE

## 2022-05-16 PROCEDURE — 2580000003 HC RX 258: Performed by: INTERNAL MEDICINE

## 2022-05-16 PROCEDURE — 36591 DRAW BLOOD OFF VENOUS DEVICE: CPT

## 2022-05-16 PROCEDURE — G8427 DOCREV CUR MEDS BY ELIG CLIN: HCPCS | Performed by: INTERNAL MEDICINE

## 2022-05-16 PROCEDURE — 96367 TX/PROPH/DG ADDL SEQ IV INF: CPT

## 2022-05-16 PROCEDURE — 96417 CHEMO IV INFUS EACH ADDL SEQ: CPT

## 2022-05-16 PROCEDURE — 6370000000 HC RX 637 (ALT 250 FOR IP): Performed by: INTERNAL MEDICINE

## 2022-05-16 PROCEDURE — G8399 PT W/DXA RESULTS DOCUMENT: HCPCS | Performed by: INTERNAL MEDICINE

## 2022-05-16 PROCEDURE — 99211 OFF/OP EST MAY X REQ PHY/QHP: CPT | Performed by: INTERNAL MEDICINE

## 2022-05-16 PROCEDURE — 85025 COMPLETE CBC W/AUTO DIFF WBC: CPT

## 2022-05-16 PROCEDURE — G8417 CALC BMI ABV UP PARAM F/U: HCPCS | Performed by: INTERNAL MEDICINE

## 2022-05-16 PROCEDURE — 96411 CHEMO IV PUSH ADDL DRUG: CPT

## 2022-05-16 PROCEDURE — 96415 CHEMO IV INFUSION ADDL HR: CPT

## 2022-05-16 PROCEDURE — 96365 THER/PROPH/DIAG IV INF INIT: CPT

## 2022-05-16 PROCEDURE — 99215 OFFICE O/P EST HI 40 MIN: CPT | Performed by: INTERNAL MEDICINE

## 2022-05-16 PROCEDURE — 4040F PNEUMOC VAC/ADMIN/RCVD: CPT | Performed by: INTERNAL MEDICINE

## 2022-05-16 PROCEDURE — 1090F PRES/ABSN URINE INCON ASSESS: CPT | Performed by: INTERNAL MEDICINE

## 2022-05-16 RX ORDER — DOXORUBICIN HYDROCHLORIDE 2 MG/ML
50 INJECTION, SOLUTION INTRAVENOUS ONCE
Status: COMPLETED | OUTPATIENT
Start: 2022-05-16 | End: 2022-05-16

## 2022-05-16 RX ORDER — HEPARIN SODIUM (PORCINE) LOCK FLUSH IV SOLN 100 UNIT/ML 100 UNIT/ML
500 SOLUTION INTRAVENOUS PRN
Status: CANCELLED | OUTPATIENT
Start: 2022-05-16

## 2022-05-16 RX ORDER — ACETAMINOPHEN 325 MG/1
650 TABLET ORAL ONCE
Status: CANCELLED | OUTPATIENT
Start: 2022-05-16

## 2022-05-16 RX ORDER — ACETAMINOPHEN 325 MG/1
650 TABLET ORAL
Status: CANCELLED | OUTPATIENT
Start: 2022-05-16

## 2022-05-16 RX ORDER — FAMOTIDINE 10 MG/ML
20 INJECTION, SOLUTION INTRAVENOUS
Status: CANCELLED | OUTPATIENT
Start: 2022-05-16

## 2022-05-16 RX ORDER — SODIUM CHLORIDE 0.9 % (FLUSH) 0.9 %
5-40 SYRINGE (ML) INJECTION PRN
Status: CANCELLED | OUTPATIENT
Start: 2022-05-16

## 2022-05-16 RX ORDER — SODIUM CHLORIDE 9 MG/ML
20 INJECTION, SOLUTION INTRAVENOUS ONCE
Status: COMPLETED | OUTPATIENT
Start: 2022-05-16 | End: 2022-05-17

## 2022-05-16 RX ORDER — DIPHENHYDRAMINE HYDROCHLORIDE 50 MG/ML
25 INJECTION INTRAMUSCULAR; INTRAVENOUS ONCE
Status: COMPLETED | OUTPATIENT
Start: 2022-05-16 | End: 2022-05-16

## 2022-05-16 RX ORDER — DOXORUBICIN HYDROCHLORIDE 2 MG/ML
50 INJECTION, SOLUTION INTRAVENOUS ONCE
Status: CANCELLED | OUTPATIENT
Start: 2022-05-16

## 2022-05-16 RX ORDER — SODIUM CHLORIDE 9 MG/ML
5-40 INJECTION INTRAVENOUS PRN
Status: CANCELLED | OUTPATIENT
Start: 2022-05-16

## 2022-05-16 RX ORDER — MEPERIDINE HYDROCHLORIDE 50 MG/ML
12.5 INJECTION INTRAMUSCULAR; INTRAVENOUS; SUBCUTANEOUS PRN
Status: CANCELLED | OUTPATIENT
Start: 2022-05-16

## 2022-05-16 RX ORDER — SODIUM CHLORIDE 9 MG/ML
25 INJECTION, SOLUTION INTRAVENOUS PRN
Status: CANCELLED | OUTPATIENT
Start: 2022-05-16

## 2022-05-16 RX ORDER — SODIUM CHLORIDE 9 MG/ML
INJECTION, SOLUTION INTRAVENOUS CONTINUOUS
Status: CANCELLED | OUTPATIENT
Start: 2022-05-16

## 2022-05-16 RX ORDER — EPINEPHRINE 1 MG/ML
0.3 INJECTION, SOLUTION, CONCENTRATE INTRAVENOUS PRN
Status: CANCELLED | OUTPATIENT
Start: 2022-05-16

## 2022-05-16 RX ORDER — PALONOSETRON 0.05 MG/ML
0.25 INJECTION, SOLUTION INTRAVENOUS ONCE
Status: CANCELLED | OUTPATIENT
Start: 2022-05-16 | End: 2022-05-16

## 2022-05-16 RX ORDER — ALBUTEROL SULFATE 90 UG/1
4 AEROSOL, METERED RESPIRATORY (INHALATION) PRN
Status: CANCELLED | OUTPATIENT
Start: 2022-05-16

## 2022-05-16 RX ORDER — DIPHENHYDRAMINE HYDROCHLORIDE 50 MG/ML
25 INJECTION INTRAMUSCULAR; INTRAVENOUS ONCE
Status: CANCELLED | OUTPATIENT
Start: 2022-05-16

## 2022-05-16 RX ORDER — DIPHENHYDRAMINE HYDROCHLORIDE 50 MG/ML
50 INJECTION INTRAMUSCULAR; INTRAVENOUS
Status: CANCELLED | OUTPATIENT
Start: 2022-05-16

## 2022-05-16 RX ORDER — PALONOSETRON 0.05 MG/ML
0.25 INJECTION, SOLUTION INTRAVENOUS ONCE
Status: COMPLETED | OUTPATIENT
Start: 2022-05-16 | End: 2022-05-16

## 2022-05-16 RX ORDER — SODIUM CHLORIDE 0.9 % (FLUSH) 0.9 %
5-40 SYRINGE (ML) INJECTION PRN
Status: DISCONTINUED | OUTPATIENT
Start: 2022-05-16 | End: 2022-05-17 | Stop reason: HOSPADM

## 2022-05-16 RX ORDER — HEPARIN SODIUM (PORCINE) LOCK FLUSH IV SOLN 100 UNIT/ML 100 UNIT/ML
500 SOLUTION INTRAVENOUS PRN
Status: DISCONTINUED | OUTPATIENT
Start: 2022-05-16 | End: 2022-05-17 | Stop reason: HOSPADM

## 2022-05-16 RX ORDER — SODIUM CHLORIDE 9 MG/ML
20 INJECTION, SOLUTION INTRAVENOUS ONCE
Status: CANCELLED | OUTPATIENT
Start: 2022-05-16 | End: 2022-05-16

## 2022-05-16 RX ORDER — ONDANSETRON 2 MG/ML
8 INJECTION INTRAMUSCULAR; INTRAVENOUS
Status: CANCELLED | OUTPATIENT
Start: 2022-05-16

## 2022-05-16 RX ORDER — ACETAMINOPHEN 325 MG/1
650 TABLET ORAL ONCE
Status: COMPLETED | OUTPATIENT
Start: 2022-05-16 | End: 2022-05-16

## 2022-05-16 RX ORDER — DEXAMETHASONE SODIUM PHOSPHATE 10 MG/ML
10 INJECTION INTRAMUSCULAR; INTRAVENOUS ONCE
Status: COMPLETED | OUTPATIENT
Start: 2022-05-16 | End: 2022-05-16

## 2022-05-16 RX ADMIN — DIPHENHYDRAMINE HYDROCHLORIDE 25 MG: 50 INJECTION, SOLUTION INTRAMUSCULAR; INTRAVENOUS at 10:10

## 2022-05-16 RX ADMIN — Medication 500 UNITS: at 16:04

## 2022-05-16 RX ADMIN — SODIUM CHLORIDE 20 ML/HR: 9 INJECTION, SOLUTION INTRAVENOUS at 08:22

## 2022-05-16 RX ADMIN — FOSAPREPITANT 150 MG: 150 INJECTION, POWDER, LYOPHILIZED, FOR SOLUTION INTRAVENOUS at 10:22

## 2022-05-16 RX ADMIN — CYCLOPHOSPHAMIDE 1460 MG: 200 INJECTION, SOLUTION INTRAVENOUS at 15:16

## 2022-05-16 RX ADMIN — SODIUM CHLORIDE 700 MG: 9 INJECTION, SOLUTION INTRAVENOUS at 11:15

## 2022-05-16 RX ADMIN — DEXAMETHASONE SODIUM PHOSPHATE 10 MG: 10 INJECTION INTRAMUSCULAR; INTRAVENOUS at 10:10

## 2022-05-16 RX ADMIN — ACETAMINOPHEN 650 MG: 325 TABLET ORAL at 10:10

## 2022-05-16 RX ADMIN — SODIUM CHLORIDE, PRESERVATIVE FREE 10 ML: 5 INJECTION INTRAVENOUS at 08:21

## 2022-05-16 RX ADMIN — VINCRISTINE SULFATE 2 MG: 1 INJECTION, SOLUTION INTRAVENOUS at 14:54

## 2022-05-16 RX ADMIN — PALONOSETRON 0.25 MG: 0.25 INJECTION, SOLUTION INTRAVENOUS at 10:10

## 2022-05-16 RX ADMIN — DOXORUBICIN HYDROCHLORIDE 98 MG: 2 INJECTION, SOLUTION INTRAVENOUS at 14:12

## 2022-05-16 RX ADMIN — PEGFILGRASTIM 6 MG: KIT SUBCUTANEOUS at 16:03

## 2022-05-16 RX ADMIN — SODIUM CHLORIDE, PRESERVATIVE FREE 10 ML: 5 INJECTION INTRAVENOUS at 16:04

## 2022-05-16 NOTE — PROGRESS NOTES
Patient arrived ambulatory with daughter for C2D1 treatment and MD visit. Patient denies complaints or concerns, states she feels much better with the addition of Prilosec. Port accessed; specimen sent. Physician met with patient; labs and orders reviewed, ok to treat. Patient premedicated. Patient tolerated Ruxience induction well with C1. Ruxience infusion begin at 36 ml/hr x 30 minutes; no adverse reaction. Ruxience infusion increased to 72 ml/hr x 30 minutes; no adverse reaction. Ruxience infusion increased to 108 ml/hr x 30 minutes; no adverse reaction. Ruxience infusion increased to 144 ml/hr for remainder of infusion; no reaction; line flushed. Adriamycin IVP completed via normal saline line; blood return present pre/mid/post IVPush; line flushed. Patient taking ice chips while administering med. Line flushed. Vincristine IVP completed via free flowing normal saline line; blood return present pre/mid/post IVPush. Line flushed. Cytoxan infused with no sign of adverse reaction; line flushed. OBI placed to arm; activated prior to discharge from unit; patient aware when to remove and discard tomorrow. Port flushed and heparinized with intact horne needle removed. Patient ambulated off unit per self at discharge; AVS per front office staff,.
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
## 2022-05-16 NOTE — TELEPHONE ENCOUNTER
Emily Myers MD VISIT & Mark Willis 3 WKS  MD VISIT 6/6/22 @ 8:30AM TX @   AVS PRINTED W/ INSTRUCTIONS AND GIVEN TO PT ON EXIT

## 2022-05-18 ENCOUNTER — TELEPHONE (OUTPATIENT)
Dept: ONCOLOGY | Facility: CLINIC | Age: 72
End: 2022-05-18

## 2022-05-18 NOTE — TELEPHONE ENCOUNTER
STEVE voicemail from patient. STEVE advised calling regarding her biopsychosocial and to see if she has any needs at this time. States she is having trouble with her copays. Asked about [employer]. States her daughter sent something in. Tita Lewis states will call [employer] and check status. SW called [employer]. States she needs her SSI letter. STEVE called patient back. Provided email to send [employer] letter. States she makes about 1800 per month in SSI. STEVE also gave website for LLS in order to apply for the copay assistance program. STEVE encouraged patient to call if anything else is needed.

## 2022-05-31 DIAGNOSIS — E78.2 MIXED HYPERLIPIDEMIA: ICD-10-CM

## 2022-05-31 RX ORDER — ASPIRIN 81 MG/1
TABLET, COATED ORAL
Qty: 90 TABLET | Refills: 1 | OUTPATIENT
Start: 2022-05-31

## 2022-06-03 ENCOUNTER — HOSPITAL ENCOUNTER (OUTPATIENT)
Facility: MEDICAL CENTER | Age: 72
End: 2022-06-03
Payer: MEDICARE

## 2022-06-06 ENCOUNTER — HOSPITAL ENCOUNTER (OUTPATIENT)
Dept: INFUSION THERAPY | Facility: MEDICAL CENTER | Age: 72
Discharge: HOME | End: 2022-06-06
Payer: MEDICARE

## 2022-06-06 ENCOUNTER — OFFICE VISIT (OUTPATIENT)
Dept: ONCOLOGY | Age: 72
End: 2022-06-06
Payer: MEDICARE

## 2022-06-06 ENCOUNTER — TELEPHONE (OUTPATIENT)
Dept: ONCOLOGY | Age: 72
End: 2022-06-06

## 2022-06-06 VITALS
WEIGHT: 175.7 LBS | DIASTOLIC BLOOD PRESSURE: 79 MMHG | TEMPERATURE: 98.2 F | BODY MASS INDEX: 32.14 KG/M2 | HEART RATE: 99 BPM | SYSTOLIC BLOOD PRESSURE: 159 MMHG

## 2022-06-06 VITALS
HEART RATE: 98 BPM | RESPIRATION RATE: 16 BRPM | DIASTOLIC BLOOD PRESSURE: 80 MMHG | SYSTOLIC BLOOD PRESSURE: 126 MMHG | TEMPERATURE: 98.2 F

## 2022-06-06 DIAGNOSIS — R63.4 WEIGHT LOSS: ICD-10-CM

## 2022-06-06 DIAGNOSIS — G89.3 CANCER ASSOCIATED PAIN: ICD-10-CM

## 2022-06-06 DIAGNOSIS — C83.33 DIFFUSE LARGE B-CELL LYMPHOMA OF INTRA-ABDOMINAL LYMPH NODES (HCC): Primary | ICD-10-CM

## 2022-06-06 DIAGNOSIS — E44.0 MODERATE MALNUTRITION (HCC): ICD-10-CM

## 2022-06-06 DIAGNOSIS — Z51.11 ENCOUNTER FOR CHEMOTHERAPY MANAGEMENT: ICD-10-CM

## 2022-06-06 LAB
ABSOLUTE EOS #: 0.1 K/UL (ref 0–0.4)
ABSOLUTE IMMATURE GRANULOCYTE: 0.05 K/UL (ref 0–0.3)
ABSOLUTE LYMPH #: 0.66 K/UL (ref 1–4.8)
ABSOLUTE MONO #: 1.12 K/UL (ref 0.2–0.8)
ALBUMIN SERPL-MCNC: 3.6 G/DL (ref 3.5–5.2)
ALP BLD-CCNC: 91 U/L (ref 35–104)
ALT SERPL-CCNC: 12 U/L (ref 5–33)
ANION GAP SERPL CALCULATED.3IONS-SCNC: 8 MMOL/L (ref 9–17)
AST SERPL-CCNC: 16 U/L
BASOPHILS # BLD: 1 %
BASOPHILS ABSOLUTE: 0.05 K/UL (ref 0–0.2)
BILIRUB SERPL-MCNC: 0.21 MG/DL (ref 0.3–1.2)
BUN BLDV-MCNC: 9 MG/DL (ref 8–23)
BUN/CREAT BLD: 17 (ref 9–20)
CALCIUM SERPL-MCNC: 9.3 MG/DL (ref 8.6–10.4)
CHLORIDE BLD-SCNC: 105 MMOL/L (ref 98–107)
CO2: 28 MMOL/L (ref 20–31)
CREAT SERPL-MCNC: 0.54 MG/DL (ref 0.5–0.9)
EOSINOPHILS RELATIVE PERCENT: 2 % (ref 1–4)
GFR AFRICAN AMERICAN: >60 ML/MIN
GFR NON-AFRICAN AMERICAN: >60 ML/MIN
GFR SERPL CREATININE-BSD FRML MDRD: ABNORMAL ML/MIN/{1.73_M2}
GLUCOSE BLD-MCNC: 116 MG/DL (ref 70–99)
HCT VFR BLD CALC: 33.5 % (ref 36.3–47.1)
HEMOGLOBIN: 10.5 G/DL (ref 11.9–15.1)
IMMATURE GRANULOCYTES: 1 %
LYMPHOCYTES # BLD: 13 % (ref 24–44)
MCH RBC QN AUTO: 30.6 PG (ref 25.2–33.5)
MCHC RBC AUTO-ENTMCNC: 31.3 G/DL (ref 28.4–34.8)
MCV RBC AUTO: 97.7 FL (ref 82.6–102.9)
MONOCYTES # BLD: 22 % (ref 1–7)
NRBC AUTOMATED: 0 PER 100 WBC
PDW BLD-RTO: 14.6 % (ref 11.8–14.4)
PLATELET # BLD: 509 K/UL (ref 138–453)
PMV BLD AUTO: 9 FL (ref 8.1–13.5)
POTASSIUM SERPL-SCNC: 4 MMOL/L (ref 3.7–5.3)
RBC # BLD: 3.43 M/UL (ref 3.95–5.11)
SEG NEUTROPHILS: 61 % (ref 36–66)
SEGMENTED NEUTROPHILS ABSOLUTE COUNT: 3.12 K/UL (ref 1.8–7.7)
SODIUM BLD-SCNC: 141 MMOL/L (ref 135–144)
TOTAL PROTEIN: 6.4 G/DL (ref 6.4–8.3)
WBC # BLD: 5.1 K/UL (ref 3.5–11.3)

## 2022-06-06 PROCEDURE — 96415 CHEMO IV INFUSION ADDL HR: CPT

## 2022-06-06 PROCEDURE — 96417 CHEMO IV INFUS EACH ADDL SEQ: CPT

## 2022-06-06 PROCEDURE — 36591 DRAW BLOOD OFF VENOUS DEVICE: CPT

## 2022-06-06 PROCEDURE — 96411 CHEMO IV PUSH ADDL DRUG: CPT

## 2022-06-06 PROCEDURE — 85025 COMPLETE CBC W/AUTO DIFF WBC: CPT

## 2022-06-06 PROCEDURE — 96409 CHEMO IV PUSH SNGL DRUG: CPT

## 2022-06-06 PROCEDURE — 1036F TOBACCO NON-USER: CPT | Performed by: INTERNAL MEDICINE

## 2022-06-06 PROCEDURE — 96367 TX/PROPH/DG ADDL SEQ IV INF: CPT

## 2022-06-06 PROCEDURE — 2580000003 HC RX 258: Performed by: INTERNAL MEDICINE

## 2022-06-06 PROCEDURE — 6370000000 HC RX 637 (ALT 250 FOR IP): Performed by: INTERNAL MEDICINE

## 2022-06-06 PROCEDURE — 3017F COLORECTAL CA SCREEN DOC REV: CPT | Performed by: INTERNAL MEDICINE

## 2022-06-06 PROCEDURE — 96413 CHEMO IV INFUSION 1 HR: CPT

## 2022-06-06 PROCEDURE — 99211 OFF/OP EST MAY X REQ PHY/QHP: CPT

## 2022-06-06 PROCEDURE — G8399 PT W/DXA RESULTS DOCUMENT: HCPCS | Performed by: INTERNAL MEDICINE

## 2022-06-06 PROCEDURE — 96365 THER/PROPH/DIAG IV INF INIT: CPT

## 2022-06-06 PROCEDURE — 1090F PRES/ABSN URINE INCON ASSESS: CPT | Performed by: INTERNAL MEDICINE

## 2022-06-06 PROCEDURE — G8417 CALC BMI ABV UP PARAM F/U: HCPCS | Performed by: INTERNAL MEDICINE

## 2022-06-06 PROCEDURE — 96375 TX/PRO/DX INJ NEW DRUG ADDON: CPT

## 2022-06-06 PROCEDURE — 99215 OFFICE O/P EST HI 40 MIN: CPT | Performed by: INTERNAL MEDICINE

## 2022-06-06 PROCEDURE — 6360000002 HC RX W HCPCS: Performed by: INTERNAL MEDICINE

## 2022-06-06 PROCEDURE — 1123F ACP DISCUSS/DSCN MKR DOCD: CPT | Performed by: INTERNAL MEDICINE

## 2022-06-06 PROCEDURE — 80053 COMPREHEN METABOLIC PANEL: CPT

## 2022-06-06 PROCEDURE — 99211 OFF/OP EST MAY X REQ PHY/QHP: CPT | Performed by: INTERNAL MEDICINE

## 2022-06-06 PROCEDURE — G8427 DOCREV CUR MEDS BY ELIG CLIN: HCPCS | Performed by: INTERNAL MEDICINE

## 2022-06-06 RX ORDER — PALONOSETRON 0.05 MG/ML
0.25 INJECTION, SOLUTION INTRAVENOUS ONCE
Status: COMPLETED | OUTPATIENT
Start: 2022-06-06 | End: 2022-06-06

## 2022-06-06 RX ORDER — ONDANSETRON 2 MG/ML
8 INJECTION INTRAMUSCULAR; INTRAVENOUS
Status: CANCELLED | OUTPATIENT
Start: 2022-06-06

## 2022-06-06 RX ORDER — SODIUM CHLORIDE 9 MG/ML
5-40 INJECTION INTRAVENOUS PRN
Status: CANCELLED | OUTPATIENT
Start: 2022-06-06

## 2022-06-06 RX ORDER — HEPARIN SODIUM (PORCINE) LOCK FLUSH IV SOLN 100 UNIT/ML 100 UNIT/ML
500 SOLUTION INTRAVENOUS PRN
Status: DISCONTINUED | OUTPATIENT
Start: 2022-06-06 | End: 2022-06-07 | Stop reason: HOSPADM

## 2022-06-06 RX ORDER — ALBUTEROL SULFATE 90 UG/1
4 AEROSOL, METERED RESPIRATORY (INHALATION) PRN
Status: CANCELLED | OUTPATIENT
Start: 2022-06-06

## 2022-06-06 RX ORDER — SODIUM CHLORIDE 9 MG/ML
INJECTION, SOLUTION INTRAVENOUS CONTINUOUS
Status: CANCELLED | OUTPATIENT
Start: 2022-06-06

## 2022-06-06 RX ORDER — DIPHENHYDRAMINE HYDROCHLORIDE 50 MG/ML
50 INJECTION INTRAMUSCULAR; INTRAVENOUS
Status: CANCELLED | OUTPATIENT
Start: 2022-06-06

## 2022-06-06 RX ORDER — ACETAMINOPHEN 325 MG/1
650 TABLET ORAL ONCE
Status: COMPLETED | OUTPATIENT
Start: 2022-06-06 | End: 2022-06-06

## 2022-06-06 RX ORDER — SODIUM CHLORIDE 9 MG/ML
25 INJECTION, SOLUTION INTRAVENOUS PRN
Status: CANCELLED | OUTPATIENT
Start: 2022-06-06

## 2022-06-06 RX ORDER — DEXAMETHASONE SODIUM PHOSPHATE 10 MG/ML
10 INJECTION INTRAMUSCULAR; INTRAVENOUS ONCE
Status: COMPLETED | OUTPATIENT
Start: 2022-06-06 | End: 2022-06-06

## 2022-06-06 RX ORDER — FAMOTIDINE 10 MG/ML
20 INJECTION, SOLUTION INTRAVENOUS
Status: CANCELLED | OUTPATIENT
Start: 2022-06-06

## 2022-06-06 RX ORDER — HYDROCODONE BITARTRATE AND ACETAMINOPHEN 5; 325 MG/1; MG/1
1 TABLET ORAL EVERY 8 HOURS PRN
Qty: 45 TABLET | Refills: 0 | Status: SHIPPED | OUTPATIENT
Start: 2022-06-06 | End: 2022-06-27 | Stop reason: SDUPTHER

## 2022-06-06 RX ORDER — SODIUM CHLORIDE 9 MG/ML
20 INJECTION, SOLUTION INTRAVENOUS ONCE
Status: COMPLETED | OUTPATIENT
Start: 2022-06-06 | End: 2022-06-06

## 2022-06-06 RX ORDER — DIPHENHYDRAMINE HYDROCHLORIDE 50 MG/ML
25 INJECTION INTRAMUSCULAR; INTRAVENOUS ONCE
Status: COMPLETED | OUTPATIENT
Start: 2022-06-06 | End: 2022-06-06

## 2022-06-06 RX ORDER — MEPERIDINE HYDROCHLORIDE 50 MG/ML
12.5 INJECTION INTRAMUSCULAR; INTRAVENOUS; SUBCUTANEOUS PRN
Status: CANCELLED | OUTPATIENT
Start: 2022-06-06

## 2022-06-06 RX ORDER — SODIUM CHLORIDE 0.9 % (FLUSH) 0.9 %
5-40 SYRINGE (ML) INJECTION PRN
Status: CANCELLED | OUTPATIENT
Start: 2022-06-06

## 2022-06-06 RX ORDER — DOXORUBICIN HYDROCHLORIDE 2 MG/ML
50 INJECTION, SOLUTION INTRAVENOUS ONCE
Status: COMPLETED | OUTPATIENT
Start: 2022-06-06 | End: 2022-06-06

## 2022-06-06 RX ORDER — ACETAMINOPHEN 325 MG/1
650 TABLET ORAL
Status: CANCELLED | OUTPATIENT
Start: 2022-06-06

## 2022-06-06 RX ADMIN — DOXORUBICIN HYDROCHLORIDE 94 MG: 2 INJECTION, SOLUTION INTRAVENOUS at 14:37

## 2022-06-06 RX ADMIN — SODIUM CHLORIDE 700 MG: 9 INJECTION, SOLUTION INTRAVENOUS at 11:06

## 2022-06-06 RX ADMIN — FOSAPREPITANT 150 MG: 150 INJECTION, POWDER, LYOPHILIZED, FOR SOLUTION INTRAVENOUS at 10:13

## 2022-06-06 RX ADMIN — PEGFILGRASTIM 6 MG: KIT SUBCUTANEOUS at 15:23

## 2022-06-06 RX ADMIN — DIPHENHYDRAMINE HYDROCHLORIDE 25 MG: 50 INJECTION, SOLUTION INTRAMUSCULAR; INTRAVENOUS at 09:52

## 2022-06-06 RX ADMIN — ACETAMINOPHEN 650 MG: 325 TABLET ORAL at 09:53

## 2022-06-06 RX ADMIN — Medication 500 UNITS: at 15:27

## 2022-06-06 RX ADMIN — SODIUM CHLORIDE 20 ML/HR: 9 INJECTION, SOLUTION INTRAVENOUS at 08:21

## 2022-06-06 RX ADMIN — CYCLOPHOSPHAMIDE 1400 MG: 200 INJECTION, SOLUTION INTRAVENOUS at 13:53

## 2022-06-06 RX ADMIN — DEXAMETHASONE SODIUM PHOSPHATE 10 MG: 10 INJECTION INTRAMUSCULAR; INTRAVENOUS at 09:52

## 2022-06-06 RX ADMIN — VINCRISTINE SULFATE 2 MG: 1 INJECTION, SOLUTION INTRAVENOUS at 14:30

## 2022-06-06 RX ADMIN — PALONOSETRON 0.25 MG: 0.05 INJECTION, SOLUTION INTRAVENOUS at 09:52

## 2022-06-06 ASSESSMENT — PAIN SCALES - GENERAL: PAINLEVEL_OUTOF10: 0
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
## 2022-06-06 NOTE — PROGRESS NOTES
Patient arrivefor cycle 3 day 1 treatment and physician visit. Denies complaint or concern. Vitals obtained  Port accessed; specimen sent. Labs reviewed  Physician met with patient, ok to treat   Patient premedicated. Rituxan  infused at a slower rate for the first 30 minutes -See MAR , increased rate for the remainder of infusion with no sign of adverse reaction; line flushed. Cytoxan infusion infused with no sign of adverse reaction; line flushed  Adriamycin IVP completed over 5 minutes while patient chewing ice; blood return present pre/mid/post IVP. Vincristine infusion completed via freeflowing normal saline line; tolerated well; blood return present pre/mid/post infusion  Port flushed and heparinized with intact horne needle removed per protocol. Patient  discharge.

## 2022-06-06 NOTE — TELEPHONE ENCOUNTER
Tatiana Kenney FOR MD VISIT & TX  DR TAYLOR IN TO SEE PATIENT  ORDERS RECEIVED  TX AFTER LABS  CT PET BEFORE RV IN 3 WEEKS  PET/CT SCHEDULED WITH JULIETA FOR 06/20/22 @10AM ARRIVE BY 9:30AM @ERIK SHIRLEY VISIT 06/27/22 @8:30AM [email or web address]  AVS PRINTED AND GIVEN TO PATIENT WITH INSTRUCTIONS  PATIENT REMAINS IN [address] [place] [address]

## 2022-06-13 ENCOUNTER — SOCIAL WORK (OUTPATIENT)
Dept: ONCOLOGY | Age: 72
End: 2022-06-13

## 2022-06-20 ENCOUNTER — HOSPITAL ENCOUNTER (OUTPATIENT)
Dept: NUCLEAR MEDICINE | Age: 72
Discharge: HOME | End: 2022-06-22
Payer: MEDICARE

## 2022-06-20 DIAGNOSIS — Z51.11 ENCOUNTER FOR CHEMOTHERAPY MANAGEMENT: ICD-10-CM

## 2022-06-20 DIAGNOSIS — C83.33 DIFFUSE LARGE B-CELL LYMPHOMA OF INTRA-ABDOMINAL LYMPH NODES (HCC): ICD-10-CM

## 2022-06-20 DIAGNOSIS — G89.3 CANCER ASSOCIATED PAIN: ICD-10-CM

## 2022-06-20 LAB — GLUCOSE BLD-MCNC: 93 MG/DL (ref 65–105)

## 2022-06-20 PROCEDURE — 3430000000 HC RX DIAGNOSTIC RADIOPHARMACEUTICAL: Performed by: INTERNAL MEDICINE

## 2022-06-20 PROCEDURE — A9552 F18 FDG: HCPCS | Performed by: INTERNAL MEDICINE

## 2022-06-20 PROCEDURE — 82947 ASSAY GLUCOSE BLOOD QUANT: CPT

## 2022-06-20 PROCEDURE — 2580000003 HC RX 258: Performed by: INTERNAL MEDICINE

## 2022-06-20 PROCEDURE — 78815 PET IMAGE W/CT SKULL-THIGH: CPT

## 2022-06-20 RX ORDER — SODIUM CHLORIDE 0.9 % (FLUSH) 0.9 %
10 SYRINGE (ML) INJECTION ONCE
Status: COMPLETED | OUTPATIENT
Start: 2022-06-20 | End: 2022-06-20

## 2022-06-20 RX ORDER — FLUDEOXYGLUCOSE F 18 200 MCI/ML
10 INJECTION, SOLUTION INTRAVENOUS
Status: COMPLETED | OUTPATIENT
Start: 2022-06-20 | End: 2022-06-20

## 2022-06-20 RX ADMIN — FLUDEOXYGLUCOSE F 18 9.6 MILLICURIE: 200 INJECTION, SOLUTION INTRAVENOUS at 10:40

## 2022-06-20 RX ADMIN — SODIUM CHLORIDE, PRESERVATIVE FREE 10 ML: 5 INJECTION INTRAVENOUS at 10:40

## 2022-06-22 ENCOUNTER — TELEPHONE (OUTPATIENT)
Dept: ONCOLOGY | Age: 72
End: 2022-06-22

## 2022-06-22 SDOH — HEALTH STABILITY: PHYSICAL HEALTH: ON AVERAGE, HOW MANY DAYS PER WEEK DO YOU ENGAGE IN MODERATE TO STRENUOUS EXERCISE (LIKE A BRISK WALK)?: 0 DAYS

## 2022-06-22 ASSESSMENT — LIFESTYLE VARIABLES
HOW OFTEN DO YOU HAVE A DRINK CONTAINING ALCOHOL: NEVER
HOW OFTEN DO YOU HAVE A DRINK CONTAINING ALCOHOL: 1

## 2022-06-22 ASSESSMENT — PATIENT HEALTH QUESTIONNAIRE - PHQ9
SUM OF ALL RESPONSES TO PHQ QUESTIONS 1-9: 0
1. LITTLE INTEREST OR PLEASURE IN DOING THINGS: 0
SUM OF ALL RESPONSES TO PHQ QUESTIONS 1-9: 0
2. FEELING DOWN, DEPRESSED OR HOPELESS: 0
SUM OF ALL RESPONSES TO PHQ9 QUESTIONS 1 & 2: 0

## 2022-06-22 NOTE — TELEPHONE ENCOUNTER
[facility] Nutrition Note:      Called pt on listed phone number r/t nutrition follow up. No answer. Detailed message with return number left. Will reattempt. Chart reviewed with 30lb wt loss (14%) x 6 months.     YANET Car, RD, LD  Registered Dietitian   [facility]  [phone]

## 2022-06-24 ENCOUNTER — HOSPITAL ENCOUNTER (OUTPATIENT)
Facility: MEDICAL CENTER | Age: 72
End: 2022-06-24
Payer: MEDICARE

## 2022-06-27 ENCOUNTER — OFFICE VISIT (OUTPATIENT)
Dept: ONCOLOGY | Age: 72
End: 2022-06-27
Payer: MEDICARE

## 2022-06-27 ENCOUNTER — TELEPHONE (OUTPATIENT)
Dept: ONCOLOGY | Age: 72
End: 2022-06-27

## 2022-06-27 ENCOUNTER — HOSPITAL ENCOUNTER (OUTPATIENT)
Dept: INFUSION THERAPY | Facility: MEDICAL CENTER | Age: 72
Discharge: HOME | End: 2022-06-27
Payer: MEDICARE

## 2022-06-27 VITALS
BODY MASS INDEX: 31.28 KG/M2 | HEART RATE: 106 BPM | WEIGHT: 171 LBS | RESPIRATION RATE: 18 BRPM | SYSTOLIC BLOOD PRESSURE: 109 MMHG | DIASTOLIC BLOOD PRESSURE: 66 MMHG | TEMPERATURE: 98 F

## 2022-06-27 VITALS — DIASTOLIC BLOOD PRESSURE: 70 MMHG | SYSTOLIC BLOOD PRESSURE: 119 MMHG

## 2022-06-27 DIAGNOSIS — G89.3 CANCER ASSOCIATED PAIN: ICD-10-CM

## 2022-06-27 DIAGNOSIS — C83.33 DIFFUSE LARGE B-CELL LYMPHOMA OF INTRA-ABDOMINAL LYMPH NODES (HCC): Primary | ICD-10-CM

## 2022-06-27 DIAGNOSIS — Z51.11 ENCOUNTER FOR CHEMOTHERAPY MANAGEMENT: ICD-10-CM

## 2022-06-27 LAB
ABSOLUTE EOS #: 0.07 K/UL (ref 0–0.44)
ABSOLUTE IMMATURE GRANULOCYTE: 0.01 K/UL (ref 0–0.3)
ABSOLUTE LYMPH #: 0.82 K/UL (ref 1.1–3.7)
ABSOLUTE MONO #: 1.01 K/UL (ref 0.1–1.2)
ALBUMIN SERPL-MCNC: 4.1 G/DL (ref 3.5–5.2)
ALP BLD-CCNC: 88 U/L (ref 35–104)
ALT SERPL-CCNC: 18 U/L (ref 5–33)
ANION GAP SERPL CALCULATED.3IONS-SCNC: 11 MMOL/L (ref 9–17)
AST SERPL-CCNC: 21 U/L
BASOPHILS # BLD: 1 % (ref 0–2)
BASOPHILS ABSOLUTE: 0.04 K/UL (ref 0–0.2)
BILIRUB SERPL-MCNC: 0.28 MG/DL (ref 0.3–1.2)
BUN BLDV-MCNC: 10 MG/DL (ref 8–23)
BUN/CREAT BLD: 17 (ref 9–20)
CALCIUM SERPL-MCNC: 9.7 MG/DL (ref 8.6–10.4)
CHLORIDE BLD-SCNC: 103 MMOL/L (ref 98–107)
CO2: 28 MMOL/L (ref 20–31)
CREAT SERPL-MCNC: 0.59 MG/DL (ref 0.5–0.9)
EOSINOPHILS RELATIVE PERCENT: 1 % (ref 1–4)
GFR AFRICAN AMERICAN: >60 ML/MIN
GFR NON-AFRICAN AMERICAN: >60 ML/MIN
GFR SERPL CREATININE-BSD FRML MDRD: ABNORMAL ML/MIN/{1.73_M2}
GLUCOSE BLD-MCNC: 111 MG/DL (ref 70–99)
HCT VFR BLD CALC: 37.2 % (ref 36.3–47.1)
HEMOGLOBIN: 11.5 G/DL (ref 11.9–15.1)
IMMATURE GRANULOCYTES: 0 %
LYMPHOCYTES # BLD: 16 % (ref 24–43)
MCH RBC QN AUTO: 31.2 PG (ref 25.2–33.5)
MCHC RBC AUTO-ENTMCNC: 30.9 G/DL (ref 28.4–34.8)
MCV RBC AUTO: 100.8 FL (ref 82.6–102.9)
MONOCYTES # BLD: 20 % (ref 3–12)
NRBC AUTOMATED: 0 PER 100 WBC
PDW BLD-RTO: 16.9 % (ref 11.8–14.4)
PLATELET # BLD: 272 K/UL (ref 138–453)
PMV BLD AUTO: 9.5 FL (ref 8.1–13.5)
POTASSIUM SERPL-SCNC: 4.2 MMOL/L (ref 3.7–5.3)
RBC # BLD: 3.69 M/UL (ref 3.95–5.11)
RBC # BLD: ABNORMAL 10*6/UL
SEG NEUTROPHILS: 62 % (ref 36–65)
SEGMENTED NEUTROPHILS ABSOLUTE COUNT: 3.1 K/UL (ref 1.5–8.1)
SODIUM BLD-SCNC: 142 MMOL/L (ref 135–144)
TOTAL PROTEIN: 6.5 G/DL (ref 6.4–8.3)
WBC # BLD: 5.1 K/UL (ref 3.5–11.3)

## 2022-06-27 PROCEDURE — 1036F TOBACCO NON-USER: CPT | Performed by: INTERNAL MEDICINE

## 2022-06-27 PROCEDURE — 1090F PRES/ABSN URINE INCON ASSESS: CPT | Performed by: INTERNAL MEDICINE

## 2022-06-27 PROCEDURE — G8417 CALC BMI ABV UP PARAM F/U: HCPCS | Performed by: INTERNAL MEDICINE

## 2022-06-27 PROCEDURE — 1123F ACP DISCUSS/DSCN MKR DOCD: CPT | Performed by: INTERNAL MEDICINE

## 2022-06-27 PROCEDURE — 99211 OFF/OP EST MAY X REQ PHY/QHP: CPT | Performed by: INTERNAL MEDICINE

## 2022-06-27 PROCEDURE — 6360000002 HC RX W HCPCS: Performed by: INTERNAL MEDICINE

## 2022-06-27 PROCEDURE — 3017F COLORECTAL CA SCREEN DOC REV: CPT | Performed by: INTERNAL MEDICINE

## 2022-06-27 PROCEDURE — 96415 CHEMO IV INFUSION ADDL HR: CPT

## 2022-06-27 PROCEDURE — 2580000003 HC RX 258: Performed by: INTERNAL MEDICINE

## 2022-06-27 PROCEDURE — 96417 CHEMO IV INFUS EACH ADDL SEQ: CPT

## 2022-06-27 PROCEDURE — 36591 DRAW BLOOD OFF VENOUS DEVICE: CPT

## 2022-06-27 PROCEDURE — 80053 COMPREHEN METABOLIC PANEL: CPT

## 2022-06-27 PROCEDURE — 6370000000 HC RX 637 (ALT 250 FOR IP): Performed by: INTERNAL MEDICINE

## 2022-06-27 PROCEDURE — 96411 CHEMO IV PUSH ADDL DRUG: CPT

## 2022-06-27 PROCEDURE — 96377 APPLICATON ON-BODY INJECTOR: CPT

## 2022-06-27 PROCEDURE — G8427 DOCREV CUR MEDS BY ELIG CLIN: HCPCS | Performed by: INTERNAL MEDICINE

## 2022-06-27 PROCEDURE — G8399 PT W/DXA RESULTS DOCUMENT: HCPCS | Performed by: INTERNAL MEDICINE

## 2022-06-27 PROCEDURE — 99215 OFFICE O/P EST HI 40 MIN: CPT | Performed by: INTERNAL MEDICINE

## 2022-06-27 PROCEDURE — 85025 COMPLETE CBC W/AUTO DIFF WBC: CPT

## 2022-06-27 PROCEDURE — 96367 TX/PROPH/DG ADDL SEQ IV INF: CPT

## 2022-06-27 PROCEDURE — 96375 TX/PRO/DX INJ NEW DRUG ADDON: CPT

## 2022-06-27 PROCEDURE — 96413 CHEMO IV INFUSION 1 HR: CPT

## 2022-06-27 RX ORDER — PALONOSETRON 0.05 MG/ML
0.25 INJECTION, SOLUTION INTRAVENOUS ONCE
Status: COMPLETED | OUTPATIENT
Start: 2022-06-27 | End: 2022-06-27

## 2022-06-27 RX ORDER — ONDANSETRON 2 MG/ML
8 INJECTION INTRAMUSCULAR; INTRAVENOUS
Status: CANCELLED | OUTPATIENT
Start: 2022-06-27

## 2022-06-27 RX ORDER — FAMOTIDINE 10 MG/ML
20 INJECTION, SOLUTION INTRAVENOUS
Status: CANCELLED | OUTPATIENT
Start: 2022-07-18

## 2022-06-27 RX ORDER — ACETAMINOPHEN 325 MG/1
650 TABLET ORAL
Status: CANCELLED | OUTPATIENT
Start: 2022-06-27

## 2022-06-27 RX ORDER — SODIUM CHLORIDE 9 MG/ML
5-40 INJECTION INTRAVENOUS PRN
Status: CANCELLED | OUTPATIENT
Start: 2022-06-27

## 2022-06-27 RX ORDER — DIPHENHYDRAMINE HYDROCHLORIDE 50 MG/ML
25 INJECTION INTRAMUSCULAR; INTRAVENOUS ONCE
Status: CANCELLED | OUTPATIENT
Start: 2022-06-27

## 2022-06-27 RX ORDER — SODIUM CHLORIDE 0.9 % (FLUSH) 0.9 %
5-40 SYRINGE (ML) INJECTION PRN
Status: CANCELLED | OUTPATIENT
Start: 2022-07-18

## 2022-06-27 RX ORDER — HYDROCODONE BITARTRATE AND ACETAMINOPHEN 5; 325 MG/1; MG/1
1 TABLET ORAL EVERY 8 HOURS PRN
Qty: 45 TABLET | Refills: 0 | Status: SHIPPED | OUTPATIENT
Start: 2022-06-27 | End: 2022-08-08

## 2022-06-27 RX ORDER — PALONOSETRON 0.05 MG/ML
0.25 INJECTION, SOLUTION INTRAVENOUS ONCE
Status: CANCELLED | OUTPATIENT
Start: 2022-06-27 | End: 2022-06-27

## 2022-06-27 RX ORDER — SODIUM CHLORIDE 0.9 % (FLUSH) 0.9 %
5-40 SYRINGE (ML) INJECTION PRN
Status: CANCELLED | OUTPATIENT
Start: 2022-06-27

## 2022-06-27 RX ORDER — SODIUM CHLORIDE 9 MG/ML
5-40 INJECTION INTRAVENOUS PRN
Status: CANCELLED | OUTPATIENT
Start: 2022-07-18

## 2022-06-27 RX ORDER — ACETAMINOPHEN 325 MG/1
650 TABLET ORAL
Status: CANCELLED | OUTPATIENT
Start: 2022-07-18

## 2022-06-27 RX ORDER — SODIUM CHLORIDE 9 MG/ML
INJECTION, SOLUTION INTRAVENOUS CONTINUOUS
Status: CANCELLED | OUTPATIENT
Start: 2022-06-27

## 2022-06-27 RX ORDER — ONDANSETRON 2 MG/ML
8 INJECTION INTRAMUSCULAR; INTRAVENOUS
Status: CANCELLED | OUTPATIENT
Start: 2022-07-18

## 2022-06-27 RX ORDER — MEPERIDINE HYDROCHLORIDE 50 MG/ML
12.5 INJECTION INTRAMUSCULAR; INTRAVENOUS; SUBCUTANEOUS PRN
Status: CANCELLED | OUTPATIENT
Start: 2022-06-27

## 2022-06-27 RX ORDER — EPINEPHRINE 1 MG/ML
0.3 INJECTION, SOLUTION, CONCENTRATE INTRAVENOUS PRN
Status: CANCELLED | OUTPATIENT
Start: 2022-06-27

## 2022-06-27 RX ORDER — ALBUTEROL SULFATE 90 UG/1
4 AEROSOL, METERED RESPIRATORY (INHALATION) PRN
Status: CANCELLED | OUTPATIENT
Start: 2022-06-27

## 2022-06-27 RX ORDER — HEPARIN SODIUM (PORCINE) LOCK FLUSH IV SOLN 100 UNIT/ML 100 UNIT/ML
500 SOLUTION INTRAVENOUS PRN
Status: CANCELLED | OUTPATIENT
Start: 2022-06-27

## 2022-06-27 RX ORDER — HEPARIN SODIUM (PORCINE) LOCK FLUSH IV SOLN 100 UNIT/ML 100 UNIT/ML
500 SOLUTION INTRAVENOUS PRN
Status: DISCONTINUED | OUTPATIENT
Start: 2022-06-27 | End: 2022-06-28 | Stop reason: HOSPADM

## 2022-06-27 RX ORDER — DOXORUBICIN HYDROCHLORIDE 2 MG/ML
50 INJECTION, SOLUTION INTRAVENOUS ONCE
Status: CANCELLED | OUTPATIENT
Start: 2022-07-18

## 2022-06-27 RX ORDER — ACETAMINOPHEN 325 MG/1
650 TABLET ORAL ONCE
Status: CANCELLED | OUTPATIENT
Start: 2022-07-18

## 2022-06-27 RX ORDER — ACETAMINOPHEN 325 MG/1
650 TABLET ORAL ONCE
Status: CANCELLED | OUTPATIENT
Start: 2022-06-27

## 2022-06-27 RX ORDER — PALONOSETRON 0.05 MG/ML
0.25 INJECTION, SOLUTION INTRAVENOUS ONCE
Status: CANCELLED | OUTPATIENT
Start: 2022-07-18 | End: 2022-07-18

## 2022-06-27 RX ORDER — DOXORUBICIN HYDROCHLORIDE 2 MG/ML
50 INJECTION, SOLUTION INTRAVENOUS ONCE
Status: COMPLETED | OUTPATIENT
Start: 2022-06-27 | End: 2022-06-27

## 2022-06-27 RX ORDER — ACETAMINOPHEN 325 MG/1
650 TABLET ORAL ONCE
Status: COMPLETED | OUTPATIENT
Start: 2022-06-27 | End: 2022-06-27

## 2022-06-27 RX ORDER — SODIUM CHLORIDE 9 MG/ML
20 INJECTION, SOLUTION INTRAVENOUS ONCE
Status: COMPLETED | OUTPATIENT
Start: 2022-06-27 | End: 2022-06-27

## 2022-06-27 RX ORDER — DIPHENHYDRAMINE HYDROCHLORIDE 50 MG/ML
25 INJECTION INTRAMUSCULAR; INTRAVENOUS ONCE
Status: COMPLETED | OUTPATIENT
Start: 2022-06-27 | End: 2022-06-27

## 2022-06-27 RX ORDER — SODIUM CHLORIDE 9 MG/ML
25 INJECTION, SOLUTION INTRAVENOUS PRN
Status: CANCELLED | OUTPATIENT
Start: 2022-07-18

## 2022-06-27 RX ORDER — SODIUM CHLORIDE 9 MG/ML
25 INJECTION, SOLUTION INTRAVENOUS PRN
Status: CANCELLED | OUTPATIENT
Start: 2022-06-27

## 2022-06-27 RX ORDER — DIPHENHYDRAMINE HYDROCHLORIDE 50 MG/ML
50 INJECTION INTRAMUSCULAR; INTRAVENOUS
Status: CANCELLED | OUTPATIENT
Start: 2022-06-27

## 2022-06-27 RX ORDER — DIPHENHYDRAMINE HYDROCHLORIDE 50 MG/ML
50 INJECTION INTRAMUSCULAR; INTRAVENOUS
Status: CANCELLED | OUTPATIENT
Start: 2022-07-18

## 2022-06-27 RX ORDER — DEXAMETHASONE SODIUM PHOSPHATE 10 MG/ML
10 INJECTION INTRAMUSCULAR; INTRAVENOUS ONCE
Status: COMPLETED | OUTPATIENT
Start: 2022-06-27 | End: 2022-06-27

## 2022-06-27 RX ORDER — MEPERIDINE HYDROCHLORIDE 50 MG/ML
12.5 INJECTION INTRAMUSCULAR; INTRAVENOUS; SUBCUTANEOUS PRN
Status: CANCELLED | OUTPATIENT
Start: 2022-07-18

## 2022-06-27 RX ORDER — ALBUTEROL SULFATE 90 UG/1
4 AEROSOL, METERED RESPIRATORY (INHALATION) PRN
Status: CANCELLED | OUTPATIENT
Start: 2022-07-18

## 2022-06-27 RX ORDER — DOXORUBICIN HYDROCHLORIDE 2 MG/ML
50 INJECTION, SOLUTION INTRAVENOUS ONCE
Status: CANCELLED | OUTPATIENT
Start: 2022-06-27

## 2022-06-27 RX ORDER — HEPARIN SODIUM (PORCINE) LOCK FLUSH IV SOLN 100 UNIT/ML 100 UNIT/ML
500 SOLUTION INTRAVENOUS PRN
Status: CANCELLED | OUTPATIENT
Start: 2022-07-18

## 2022-06-27 RX ORDER — SODIUM CHLORIDE 9 MG/ML
INJECTION, SOLUTION INTRAVENOUS CONTINUOUS
Status: CANCELLED | OUTPATIENT
Start: 2022-07-18

## 2022-06-27 RX ORDER — SODIUM CHLORIDE 0.9 % (FLUSH) 0.9 %
5-40 SYRINGE (ML) INJECTION PRN
Status: DISCONTINUED | OUTPATIENT
Start: 2022-06-27 | End: 2022-06-28 | Stop reason: HOSPADM

## 2022-06-27 RX ORDER — DIPHENHYDRAMINE HYDROCHLORIDE 50 MG/ML
25 INJECTION INTRAMUSCULAR; INTRAVENOUS ONCE
Status: CANCELLED | OUTPATIENT
Start: 2022-07-18

## 2022-06-27 RX ORDER — EPINEPHRINE 1 MG/ML
0.3 INJECTION, SOLUTION, CONCENTRATE INTRAVENOUS PRN
Status: CANCELLED | OUTPATIENT
Start: 2022-07-18

## 2022-06-27 RX ORDER — SODIUM CHLORIDE 9 MG/ML
20 INJECTION, SOLUTION INTRAVENOUS ONCE
Status: CANCELLED | OUTPATIENT
Start: 2022-06-27 | End: 2022-06-27

## 2022-06-27 RX ORDER — FAMOTIDINE 10 MG/ML
20 INJECTION, SOLUTION INTRAVENOUS
Status: CANCELLED | OUTPATIENT
Start: 2022-06-27

## 2022-06-27 RX ORDER — SODIUM CHLORIDE 9 MG/ML
20 INJECTION, SOLUTION INTRAVENOUS ONCE
Status: CANCELLED | OUTPATIENT
Start: 2022-07-18 | End: 2022-07-18

## 2022-06-27 RX ADMIN — SODIUM CHLORIDE, PRESERVATIVE FREE 10 ML: 5 INJECTION INTRAVENOUS at 15:59

## 2022-06-27 RX ADMIN — HEPARIN 500 UNITS: 100 SYRINGE at 15:59

## 2022-06-27 RX ADMIN — SODIUM CHLORIDE 50 ML/HR: 9 INJECTION, SOLUTION INTRAVENOUS at 08:36

## 2022-06-27 RX ADMIN — PALONOSETRON 0.25 MG: 0.05 INJECTION, SOLUTION INTRAVENOUS at 10:16

## 2022-06-27 RX ADMIN — CYCLOPHOSPHAMIDE 1400 MG: 200 INJECTION, SOLUTION INTRAVENOUS at 15:17

## 2022-06-27 RX ADMIN — ACETAMINOPHEN 650 MG: 325 TABLET ORAL at 10:16

## 2022-06-27 RX ADMIN — PEGFILGRASTIM 6 MG: KIT SUBCUTANEOUS at 15:05

## 2022-06-27 RX ADMIN — DEXAMETHASONE SODIUM PHOSPHATE 10 MG: 10 INJECTION INTRAMUSCULAR; INTRAVENOUS at 10:19

## 2022-06-27 RX ADMIN — SODIUM CHLORIDE 700 MG: 9 INJECTION, SOLUTION INTRAVENOUS at 11:16

## 2022-06-27 RX ADMIN — FOSAPREPITANT 150 MG: 150 INJECTION, POWDER, LYOPHILIZED, FOR SOLUTION INTRAVENOUS at 10:27

## 2022-06-27 RX ADMIN — DOXORUBICIN HYDROCHLORIDE 94 MG: 2 INJECTION, SOLUTION INTRAVENOUS at 14:44

## 2022-06-27 RX ADMIN — VINCRISTINE SULFATE 2 MG: 1 INJECTION, SOLUTION INTRAVENOUS at 14:55

## 2022-06-27 RX ADMIN — DIPHENHYDRAMINE HYDROCHLORIDE 25 MG: 50 INJECTION, SOLUTION INTRAMUSCULAR; INTRAVENOUS at 10:22

## 2022-06-27 RX ADMIN — SODIUM CHLORIDE, PRESERVATIVE FREE 30 ML: 5 INJECTION INTRAVENOUS at 08:30

## 2022-06-27 ASSESSMENT — PAIN SCALES - GENERAL: PAINLEVEL_OUTOF10: 0
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
## 2022-06-27 NOTE — PROGRESS NOTES
PT ARRIVES PER AMBULATORY WITH VISITOR AND LABS AND ORDERS REVIEWED AND PT SEEN PER MD. NS INFUSING BEFORE AND BETWEEN AND AFTER PREMEDS AND CHEMO. ICE TAKEN ORALLY WITH ADRIAMYCIN GIVEN AT Y-SITE TO IV INFUSING  ML/HR. PT RETURNS TOMORROW FOR INJECTION. NEULASTA ON BODY, APPLIED AFTER CLEANSED WITH CHLOROPREP. SLOW GREEN FLASH NOTED AND PT STATES UNDERSTANDS CARE OF Claudetta Potters. NO REACTIONS OR COMPLAINTS AND BLOOD RETURN PRESENT THROUGHOUT INFUSIONS. PT DISCHARGED PER AMBULATORY WITH VISITOR.

## 2022-06-27 NOTE — TELEPHONE ENCOUNTER
Name: Deepti Joiner  : 1950  MRN: [id number]    Oncology Navigation Follow-Up Note    Contact Type:  Telephone    Notes: Attempted to contact pt for intitation of navigation, no answer, VM left askign t to return writers call, contact information was provided. Will continue to follow.      Electronically signed by Angel Phillips RN on 2022 at 3:52 PM

## 2022-06-27 NOTE — TELEPHONE ENCOUNTER
Elda Ferraro MD VISIT & TX  DR TAYLOR IN TO SEE PATIENT  ORDERS RECEIVED  REFER TO [facility]  NAVIGATOR  [address] TODAY  RV 3 WEEKS  WRITER SENT EMAIL TO DEXTER HARMAN TO ASSIGN NURSE NAVIGATOR  WRITER FAXED [facility] REFERRAL & DEMOGRAPHICS TO [phone], CONFIRMATION SCANNED TO CHART  MD VISIT 07/18/22 @8:30AM [email or web address]  AVS PRINTED AND GIVEN TO PATIENT WITH INSTRUCTIONS  PATIENT REMAINS IN [address] [place] [address]

## 2022-06-28 ENCOUNTER — TELEPHONE (OUTPATIENT)
Dept: ONCOLOGY | Age: 72
End: 2022-06-28

## 2022-06-28 NOTE — TELEPHONE ENCOUNTER
SAAD FROM [facility]/ LYMPHEDEMA COORDINATOR CALLED AND LEFT A VM ON 6/28/22 AT 10:22AM THAT PATIENT IS SCHEDULED ON 7/6/22 AT 3:30PM WITH DR Thierry DEJESUS (PER PATIENT) REQUEST. IF ANY QUESTIONS, PLEASE CALL SAAD [phone].

## 2022-06-28 NOTE — TELEPHONE ENCOUNTER
Name: Jonathan Chew  : 1950  MRN: [id number]    Oncology Navigation- Initial Note:    Intake-  Contact Type: Telephone    Diagnosis: Lymphoma     Home Disposition: Lives with other who is able to assist    Patient needs and barriers to care: N[place] Barriers Identified     Referral Source: Outpatient    Notes: Writer spoke with pt, introduced self and role. Asked pt is she felt she had any barriers to care, pt denied at this time. She is currently working with Social Work to help with financial concerns. She reports [facility] called to schedule an appt but she missed the call . She has the number to return their call to make her consultation. Writer encouraged her to call navigator back if she experiences any delay with getting appt at [facility] coordinated. She verbalized understanding. Writer provided contact information. Will continue to follow.      Electronically signed by Sumaya Greenberg RN on 2022 at 10:20 AM

## 2022-06-29 ENCOUNTER — OFFICE VISIT (OUTPATIENT)
Dept: FAMILY MEDICINE CLINIC | Age: 72
End: 2022-06-29
Payer: MEDICARE

## 2022-06-29 VITALS
HEART RATE: 100 BPM | HEIGHT: 62 IN | TEMPERATURE: 97.4 F | SYSTOLIC BLOOD PRESSURE: 112 MMHG | OXYGEN SATURATION: 96 % | WEIGHT: 172.8 LBS | BODY MASS INDEX: 31.8 KG/M2 | DIASTOLIC BLOOD PRESSURE: 68 MMHG

## 2022-06-29 DIAGNOSIS — Z71.89 ADVANCE CARE PLANNING: ICD-10-CM

## 2022-06-29 DIAGNOSIS — Z00.00 MEDICARE ANNUAL WELLNESS VISIT, SUBSEQUENT: Primary | ICD-10-CM

## 2022-06-29 DIAGNOSIS — C83.33 DIFFUSE LARGE B-CELL LYMPHOMA OF INTRA-ABDOMINAL LYMPH NODES (HCC): ICD-10-CM

## 2022-06-29 PROCEDURE — 3017F COLORECTAL CA SCREEN DOC REV: CPT | Performed by: FAMILY MEDICINE

## 2022-06-29 PROCEDURE — 1123F ACP DISCUSS/DSCN MKR DOCD: CPT | Performed by: FAMILY MEDICINE

## 2022-06-29 PROCEDURE — G0439 PPPS, SUBSEQ VISIT: HCPCS | Performed by: FAMILY MEDICINE

## 2022-06-29 ASSESSMENT — VISUAL ACUITY
OS_CC: 20/70
OD_CC: 20/30

## 2022-06-29 NOTE — PROGRESS NOTES
Visit Information    Have you changed or started any medications since your last visit including any over-the-counter medicines, vitamins, or herbal medicines? no   Have you stopped taking any of your medications? Is so, why? -  yes -   Are you having any side effects from any of your medications? - no    Have you seen any other physician or provider since your last visit? yes -    Have you had any other diagnostic tests since your last visit? yes -    Have you been seen in the emergency room and/or had an admission in a hospital since we last saw you?  no   Have you had your routine dental cleaning in the past 6 months?  yes -      Do you have an active MyChart account? If no, what is the barrier?   Yes    Patient Care Team:  Penelope Severino MD as PCP - General (Family Medicine)  Penelope Severino MD as PCP - [facility] Provider  Uyen Orellana RD, LD as Dietitian (Dietitian Registered)  Suhas Holden RN as Nurse Navigator (Oncology)    Medical History Review  Past Medical, Family, and Social History reviewed and does contribute to the patient presenting condition    Health Maintenance   Topic Date Due    Shingles vaccine (1 of 2) Never done   [employer] Visit (AWV)  12/30/2021    COVID-19 Vaccine (4 - Booster for Medicalis series) 03/08/2022    Lipids  02/17/2023    Depression Screen  06/22/2023    Colorectal Cancer Screen  10/28/2023    Breast cancer screen  02/21/2024    DTaP/Tdap/Td vaccine (3 - Td or Tdap) 02/08/2030    DEXA (modify frequency per FRAX score)  Completed    Flu vaccine  Completed    Pneumococcal 65+ years Vaccine  Completed    Hepatitis C screen  Completed    Hepatitis A vaccine  Aged Out    Hepatitis B vaccine  Aged Out    Hib vaccine  Aged Out    Meningococcal (ACWY) vaccine  Aged Out

## 2022-06-29 NOTE — PATIENT INSTRUCTIONS
Learning About Healthy Weight  What is a healthy weight? A healthy weight is the weight at which you feel good about yourself and have energy for work and play. It's also one that lowers your risk for healthproblems. What can you do to stay at a healthy weight? It can be hard to stay at a healthy weight, especially when fast food, vending-machine snacks, and processed foods are so easy to find. And with your busy lifestyle, activity may be low on your list of things to do. But stayingat a healthy weight may be easier than you think. Here are some dos and don'ts for staying at a healthy weight. Do eat healthy foods  The kinds of foods you eat have a big impact on both your weight and your health. Reaching and staying at a healthy weight is not about going on a diet. It's about making healthier food choices every day and changing your diet forgood. Healthy eating means eating a variety of foods so that you get all the nutrients you need. Your body needs protein, carbohydrate, and fats for energy. They keep your heart beating, your brain active, and your muscles working. On most days, try to eat from each food group. This means eating a variety of:   Whole grains, such as whole wheat breads and pastas.  Fruits and vegetables.  Dairy products, such as low-fat milk, yogurt, and cheese.  Lean proteins, such as all types of fish, chicken without the skin, and beans. Don't have too much or too little of one thing. All foods, if eaten inmoderation, can be part of healthy eating. Even sweets can be okay. If your favorite foods are high in fat, salt, sugar, or calories, limit howoften you eat them. Eat smaller servings, or look for healthy substitutes. Do watch what you eat  Many people eat more than their bodies need. Part of staying at a healthy weight means learning how much food you really need from day to day and not eating more than that.  Even with healthy foods, eating too much can make yougain weight. Having a well-balanced diet means that you eat enough, but not too much, and that your food gives you the nutrients you need to stay healthy. So listen toyour body. Eat when you're hungry. Stop when you feel satisfied. It's a good idea to have healthy snacks ready for when you get hungry. Keep healthy snacks with you at work, in your car, and at home. If you have a healthy snack easily available, you'll be less likely to pick a candy bar orbag of chips from a vending machine instead. Some healthy snacks you might want to keep on hand are fruit, low-fat yogurt, string cheese, low-fat microwave popcorn, raisins and other dried fruit, nuts,whole wheat crackers, pretzels, carrots, celery sticks, and broccoli. Do some physical activity  A big part of reaching and staying at a healthy weight is being active. When you're active, you burn calories. This makes it easier to reach and stay at a healthy weight. When you're active on a regular basis, your body burns more calories, even when you're at rest. Being active helps you lose fat andbuild lean muscle. Try to be active for at least 1 hour every day. This may sound like a lot, but it's okay to be active in smaller blocks of time that add up to 1 hour a day. Any activity that makes your heart beat faster and keeps it there for a while counts. A brisk walk, run, or swim will get your heart beating faster. So will climbing stairs, shooting baskets, or cycling. Even some household chores likevacuuming and mowing the lawn will get your heart rate up. Pick activities that you enjoyones that make your heart beat faster, your muscles stronger, and your muscles and joints more flexible. If you find more than one thing you like doing, do them all. You don't have to do the same thingevery day. Don't diet  Diets don't work. Diets are temporary. Because you give up so much when you diet, you may be hungry and think about food all the time.  And after you stop dieting, you also may overeat to make up for what you missed. Most people who diet end up gainingback the pounds they lostand more. Remember that healthy bodies come in lots of shapes and sizes. Everyone can gethealthier by eating better and being more active. Where can you learn more? Go to https://chpepiceweb.[employer]. org and sign in to your sofatutor account. Enter [id number] [id number] in the [employer] box to learn more about \"Learning About Healthy Weight. \"     If you do not have an account, please click on the \"Sign Up Now\" link. Current as of: December 27, 2021               Content Version: 13.3  © 5737-6773 Healthwise, [employer]. Care instructions adapted under license by [facility] ([facility]). If you have questions about a medical condition or this instruction, always ask your healthcare professional. [facility] any warranty or liability for your use of this information. Personalized Preventive Plan for Ti Mcdermott - 6/29/2022  Medicare offers a range of preventive health benefits. Some of the tests and screenings are paid in full while other may be subject to a deductible, co-insurance, and/or copay. Some of these benefits include a comprehensive review of your medical history including lifestyle, illnesses that may run in your family, and various assessments and screenings as appropriate. After reviewing your medical record and screening and assessments performed today your provider may have ordered immunizations, labs, imaging, and/or referrals for you. A list of these orders (if applicable) as well as your Preventive Care list are included within your After Visit Summary for your review. Other Preventive Recommendations:    · A preventive eye exam performed by an eye specialist is recommended every 1-2 years to screen for glaucoma; cataracts, macular degeneration, and other eye disorders. · A preventive dental visit is recommended every 6 months.   · Try to get at

## 2022-06-29 NOTE — PROGRESS NOTES
Medicare Annual Wellness Visit    [address]o is here for Medicare AWV (NO CONCERNS)    Assessment & Plan   Medicare annual wellness visit, subsequent  Advance care planning  -     Ambulatory Referral to ACP Clinical Specialist  Diffuse large B-cell lymphoma of intra-abdominal lymph nodes ([facility] [address]      Recommendations for Preventive Services Due: see orders and patient instructions/AVS.  Recommended screening schedule for the next 5-10 years is provided to the patient in written form: see Patient Instructions/AVS.     Return in 3 months (on 9/29/2022) for ,always chronic conditons. Subjective     Patient is scheduled for Medicare wellness visit. Patient was diagnosed with B-cell lymphoma, she started chemotherapy, but has no change seen in tumor size. Patient has been referred to [facility] clinic. Patient's complete Health Risk Assessment and screening values have been reviewed and are found in Flowsheets. The following problems were reviewed today and where indicated follow up appointments were made and/or referrals ordered. Positive Risk Factor Screenings with Interventions:               General Health and ACP:  General  In general, how would you say your health is?: Excellent  In the past 7 days, have you experienced any of the following: New or Increased Pain, New or Increased Fatigue, Loneliness, Social Isolation, Stress or Anger?: No  Do you get the social and emotional support that you need?: (!) No  Do you have a Living Will?: Yes    Advance Directives     Power of  Living Will ACP-Advance Directive ACP-Power of     Not on File Not on File Not on File Filed      General Health Risk Interventions:  · Loneliness: pt has family around and friends   · Social isolation: pt has kids and sisters who live close buy.     Health Habits/Nutrition:     Physical Activity: Unknown    Days of Exercise per Week: 0 days    Minutes of Exercise per Session: Not on file     Have you lost any weight without trying in the past 3 months?: (!) Yes  Body mass index: (!) 31.6  Have you seen the dentist within the past year?: (!) No    Health Habits/Nutrition Interventions:  · Dental exam overdue:  patient encouraged to make appointment with his/her dentist    Hearing/Vision:  Do you or your family notice any trouble with your hearing that hasn't been managed with hearing aids?: No  Do you have difficulty driving, watching TV, or doing any of your daily activities because of your eyesight?: No  Have you had an eye exam within the past year?: Yes   Visual Acuity Screening    Right eye Left eye Both eyes   Without correction:      With correction: 20/30 20/70 20/25     Hearing/Vision Interventions:  · Hearing concerns:  patient declines any further evaluation/treatment for hearing issues            Objective   Vitals:    06/29/22 0756 06/29/22 0813   BP: 112/68    Pulse: (!) 103 100   Temp: 97.4 °F (36.3 °C)    SpO2: 96%    Weight: 172 lb 12.8 oz (78.4 kg)    Height: 5' 2\" (1.575 m)       Body mass index is 31.61 kg/m². Allergies   Allergen Reactions    Vicodin [Hydrocodone-Acetaminophen] Other (See Comments)     halucinations  Pt takes Norco without any side effects     Prior to Visit Medications    Medication Sig Taking? Authorizing Provider   HYDROcodone-acetaminophen (NORCO) 5-325 MG per tablet Take 1 tablet by mouth every 8 hours as needed for Pain for up to 30 days.  Yes Annabelle Gutierres MD   omeprazole (PRILOSEC) 20 MG delayed release capsule Take 1 capsule by mouth 2 times daily (before meals) Yes Annabelle Gutierres MD   ondansetron (ZOFRAN ODT) 8 MG TBDP disintegrating tablet Take 1 tablet by mouth every 8 hours as needed for Nausea or Vomiting Yes Annabelle Gutierres MD   atorvastatin (LIPITOR) 20 MG tablet Take 1 tablet by mouth daily take 1 tablet by mouth once daily Yes Frank Mcnamara MD   vitamin D (CHOLECALCIFEROL) 1000 UNIT TABS tablet Take 1 tablet by mouth daily VITAMIN D3. OK to substitute Yes Ragini No MD   Multiple Vitamin (MULTI VITAMIN PO) Take by mouth Yes Historical Provider, MD   aspirin (RA ASPIRIN EC) 81 MG EC tablet Take 1 tablet by mouth daily take 1 tablet by mouth once daily  Ragini No MD       [facility] (Including outside providers/suppliers regularly involved in providing care):   Patient Care Team:  Ragini No MD as PCP - General (Family Medicine)  Ragini No MD as PCP - [facility] Empaneled Provider  Adelina Martin RD, LD as Dietitian (Dietitian Registered)  Kelsie Urbina RN as Nurse Navigator (Oncology)     Reviewed and updated this visit:  Tobacco  Allergies  Meds  Problems  Med Hx  Surg Hx  Soc Hx  Fam Hx                  Cardiovascular Disease Risk Counseling: Assessed the patient's risk to develop cardiovascular disease and reviewed main risk factors. Reviewed steps to reduce disease risk including:   · Quitting tobacco use, reducing amount smoked, or not starting the habit  · Making healthy food choices  · Being physically active and gradualy increasing activity levels   · Reduce weight and determine a healthy BMI goal  · Monitor blood pressure and treat if higher than 140/90 mmHg  · Maintain blood total cholesterol levels under 5 mmol/l or 190 mg/dl  · Maintain LDL cholesterol levels under 3.0 mmol/l or 115 mg/dl   · Control blood glucose levels  · Consider taking aspirin (75 mg daily), once blood pressure is controlled   Provided a follow up plan.   Time spent (minutes): 10

## 2022-06-30 ENCOUNTER — CLINICAL DOCUMENTATION (OUTPATIENT)
Dept: SPIRITUAL SERVICES | Age: 72
End: 2022-06-30

## 2022-06-30 NOTE — ACP (ADVANCE CARE PLANNING)
Advance Care Planning   Ambulatory ACP Specialist Patient Outreach    Date:  6/30/2022  ACP Specialist:  Wilner Manzano    Outreach call to patient in follow-up to ACP Specialist referral from: Clara Vernon MD    [x] PCP  [] Provider   [] Ambulatory Care Management [] Other for Reason:    [x] Advance Directive Assistance  [] Code Status Discussion  [] Complete Portable DNR Order  [] Discuss Goals of Care  [] Complete POST/MOST  [] Early ACP Decision-Making  [] Other    Date Referral Received:6/29/22    Today's Outreach:  [x] First   [] Second  [] Third                               First outreach made by [x]  phone  [] email []   [employer]     Intervention:  [] Spoke with Patient  [x] Left VM requesting return call      Outcome: Left detailed VM for Patient to return call. Next Step:   [] ACP scheduled conversation  [x] Outreach again in two week               [] Email / Mail ACP Info Sheets  [] Email / Mail Advance Directive            [] Close Referral. Routing closure to referring provider/staff and to ACP Specialist .      Thank you for this referral.

## 2022-07-01 ENCOUNTER — TELEPHONE (OUTPATIENT)
Dept: ONCOLOGY | Age: 72
End: 2022-07-01

## 2022-07-01 NOTE — TELEPHONE ENCOUNTER
[facility] Nutrition Note:        Called pt on listed phone number r/t nutrition follow up. No answer.  Detailed message with return number left.      YANET Santiago, RD, LD  Registered Dietitian      [facility]  [phone]

## 2022-07-01 NOTE — TELEPHONE ENCOUNTER
Name: Juan Lemus  : 1950  MRN: [id number]    Oncology Navigation Follow-Up Note    Contact Type:  Telephone    Notes: Writer attempted to contact pt for ONN f/u. No answer, VM left encouraging pt to contact writer with any barriers/concerns, number was provided. Pt is scheduled at [facility] for 22. Will continue to follow.      Electronically signed by Luz Albrecht RN on 2022 at 9:33 AM

## 2022-07-08 ENCOUNTER — TELEPHONE (OUTPATIENT)
Dept: ONCOLOGY | Age: 72
End: 2022-07-08

## 2022-07-08 NOTE — TELEPHONE ENCOUNTER
[facility] Nutrition Note:     Called pt on listed phone number r/t nutrition follow up. No answer.  Detailed message with return number left.      YANET Torres, RD, LD  Registered Dietitian      [facility]  [phone]

## 2022-07-14 ENCOUNTER — CLINICAL DOCUMENTATION (OUTPATIENT)
Dept: SPIRITUAL SERVICES | Age: 72
End: 2022-07-14

## 2022-07-14 ENCOUNTER — HOSPITAL ENCOUNTER (OUTPATIENT)
Facility: MEDICAL CENTER | Age: 72
End: 2022-07-14
Payer: MEDICARE

## 2022-07-14 NOTE — ACP (ADVANCE CARE PLANNING)
Advance Care Planning   Ambulatory ACP Specialist Patient Outreach    Date:  7/14/2022  ACP Specialist:  Naya Philip    Outreach call to patient in follow-up to ACP Specialist referral from: Garret Cabrera MD    [x] PCP  [] Provider   [] Ambulatory Care Management [] Other for Reason:    [x] Advance Directive Assistance  [] Code Status Discussion  [] Complete Portable DNR Order  [] Discuss Goals of Care  [] Complete POST/MOST  [] Early ACP Decision-Making  [] Other    Date Referral Received:6/29/22    Today's Outreach:  [] First   [x] Second  [] Third                               Second outreach made by [x]  phone  [x] email []   [employer]     Intervention:  [] Spoke with Patient  [x] Left VM requesting return call      Outcome: Left detailed VM for Patient to return call. Sent Email with ACP Packet included. Next Step:   [] ACP scheduled conversation  [x] Outreach again in two week               [x] Email / Mail ACP Info Sheets  [x] Email / Mail Advance Directive            [] Close Referral. Routing closure to referring provider/staff and to ACP Specialist . [] Closure Letter mailed to Patient with Invitation to Contact ACP Specialist if/when ready.     Thank you for this referral.

## 2022-07-18 ENCOUNTER — TELEPHONE (OUTPATIENT)
Dept: ONCOLOGY | Age: 72
End: 2022-07-18

## 2022-07-18 ENCOUNTER — OFFICE VISIT (OUTPATIENT)
Dept: ONCOLOGY | Age: 72
End: 2022-07-18
Payer: MEDICARE

## 2022-07-18 ENCOUNTER — HOSPITAL ENCOUNTER (OUTPATIENT)
Dept: INFUSION THERAPY | Facility: MEDICAL CENTER | Age: 72
Discharge: HOME | End: 2022-07-18
Payer: MEDICARE

## 2022-07-18 VITALS
BODY MASS INDEX: 31.08 KG/M2 | WEIGHT: 169.9 LBS | HEART RATE: 118 BPM | DIASTOLIC BLOOD PRESSURE: 83 MMHG | RESPIRATION RATE: 16 BRPM | TEMPERATURE: 97.8 F | SYSTOLIC BLOOD PRESSURE: 132 MMHG

## 2022-07-18 VITALS — OXYGEN SATURATION: 96 % | DIASTOLIC BLOOD PRESSURE: 64 MMHG | HEART RATE: 102 BPM | SYSTOLIC BLOOD PRESSURE: 101 MMHG

## 2022-07-18 DIAGNOSIS — C83.33 DIFFUSE LARGE B-CELL LYMPHOMA OF INTRA-ABDOMINAL LYMPH NODES (HCC): Primary | ICD-10-CM

## 2022-07-18 DIAGNOSIS — Z51.11 ENCOUNTER FOR CHEMOTHERAPY MANAGEMENT: ICD-10-CM

## 2022-07-18 DIAGNOSIS — G89.3 CANCER ASSOCIATED PAIN: ICD-10-CM

## 2022-07-18 LAB
ABSOLUTE EOS #: 0.06 K/UL (ref 0–0.44)
ABSOLUTE IMMATURE GRANULOCYTE: 0.02 K/UL (ref 0–0.3)
ABSOLUTE LYMPH #: 0.77 K/UL (ref 1.1–3.7)
ABSOLUTE MONO #: 1.14 K/UL (ref 0.1–1.2)
ALBUMIN SERPL-MCNC: 4 G/DL (ref 3.5–5.2)
ALP BLD-CCNC: 93 U/L (ref 35–104)
ALT SERPL-CCNC: 22 U/L (ref 5–33)
ANION GAP SERPL CALCULATED.3IONS-SCNC: 9 MMOL/L (ref 9–17)
AST SERPL-CCNC: 26 U/L
BASOPHILS # BLD: 1 % (ref 0–2)
BASOPHILS ABSOLUTE: 0.03 K/UL (ref 0–0.2)
BILIRUB SERPL-MCNC: 0.21 MG/DL (ref 0.3–1.2)
BUN BLDV-MCNC: 15 MG/DL (ref 8–23)
BUN/CREAT BLD: 25 (ref 9–20)
CALCIUM SERPL-MCNC: 9.3 MG/DL (ref 8.6–10.4)
CHLORIDE BLD-SCNC: 105 MMOL/L (ref 98–107)
CO2: 26 MMOL/L (ref 20–31)
CREAT SERPL-MCNC: 0.61 MG/DL (ref 0.5–0.9)
EOSINOPHILS RELATIVE PERCENT: 1 % (ref 1–4)
GFR AFRICAN AMERICAN: >60 ML/MIN
GFR NON-AFRICAN AMERICAN: >60 ML/MIN
GFR SERPL CREATININE-BSD FRML MDRD: ABNORMAL ML/MIN/{1.73_M2}
GLUCOSE BLD-MCNC: 90 MG/DL (ref 70–99)
HCT VFR BLD CALC: 36.6 % (ref 36.3–47.1)
HEMOGLOBIN: 11.5 G/DL (ref 11.9–15.1)
IMMATURE GRANULOCYTES: 0 %
LYMPHOCYTES # BLD: 15 % (ref 24–43)
MCH RBC QN AUTO: 31.9 PG (ref 25.2–33.5)
MCHC RBC AUTO-ENTMCNC: 31.4 G/DL (ref 28.4–34.8)
MCV RBC AUTO: 101.7 FL (ref 82.6–102.9)
MONOCYTES # BLD: 22 % (ref 3–12)
NRBC AUTOMATED: 0 PER 100 WBC
PDW BLD-RTO: 16.3 % (ref 11.8–14.4)
PLATELET # BLD: 255 K/UL (ref 138–453)
PMV BLD AUTO: 9.3 FL (ref 8.1–13.5)
POTASSIUM SERPL-SCNC: 4.2 MMOL/L (ref 3.7–5.3)
RBC # BLD: 3.6 M/UL (ref 3.95–5.11)
RBC # BLD: ABNORMAL 10*6/UL
SEG NEUTROPHILS: 61 % (ref 36–65)
SEGMENTED NEUTROPHILS ABSOLUTE COUNT: 3.19 K/UL (ref 1.5–8.1)
SODIUM BLD-SCNC: 140 MMOL/L (ref 135–144)
TOTAL PROTEIN: 6.8 G/DL (ref 6.4–8.3)
WBC # BLD: 5.2 K/UL (ref 3.5–11.3)

## 2022-07-18 PROCEDURE — 3017F COLORECTAL CA SCREEN DOC REV: CPT | Performed by: INTERNAL MEDICINE

## 2022-07-18 PROCEDURE — 96411 CHEMO IV PUSH ADDL DRUG: CPT

## 2022-07-18 PROCEDURE — 6360000002 HC RX W HCPCS: Performed by: INTERNAL MEDICINE

## 2022-07-18 PROCEDURE — 36591 DRAW BLOOD OFF VENOUS DEVICE: CPT

## 2022-07-18 PROCEDURE — 99215 OFFICE O/P EST HI 40 MIN: CPT | Performed by: INTERNAL MEDICINE

## 2022-07-18 PROCEDURE — 96417 CHEMO IV INFUS EACH ADDL SEQ: CPT

## 2022-07-18 PROCEDURE — G8427 DOCREV CUR MEDS BY ELIG CLIN: HCPCS | Performed by: INTERNAL MEDICINE

## 2022-07-18 PROCEDURE — 99211 OFF/OP EST MAY X REQ PHY/QHP: CPT | Performed by: INTERNAL MEDICINE

## 2022-07-18 PROCEDURE — 80053 COMPREHEN METABOLIC PANEL: CPT

## 2022-07-18 PROCEDURE — G8417 CALC BMI ABV UP PARAM F/U: HCPCS | Performed by: INTERNAL MEDICINE

## 2022-07-18 PROCEDURE — 85025 COMPLETE CBC W/AUTO DIFF WBC: CPT

## 2022-07-18 PROCEDURE — 2580000003 HC RX 258: Performed by: INTERNAL MEDICINE

## 2022-07-18 PROCEDURE — 1123F ACP DISCUSS/DSCN MKR DOCD: CPT | Performed by: INTERNAL MEDICINE

## 2022-07-18 PROCEDURE — 96377 APPLICATON ON-BODY INJECTOR: CPT

## 2022-07-18 PROCEDURE — 96415 CHEMO IV INFUSION ADDL HR: CPT

## 2022-07-18 PROCEDURE — 96375 TX/PRO/DX INJ NEW DRUG ADDON: CPT

## 2022-07-18 PROCEDURE — 1036F TOBACCO NON-USER: CPT | Performed by: INTERNAL MEDICINE

## 2022-07-18 PROCEDURE — G8399 PT W/DXA RESULTS DOCUMENT: HCPCS | Performed by: INTERNAL MEDICINE

## 2022-07-18 PROCEDURE — 96413 CHEMO IV INFUSION 1 HR: CPT

## 2022-07-18 PROCEDURE — 1090F PRES/ABSN URINE INCON ASSESS: CPT | Performed by: INTERNAL MEDICINE

## 2022-07-18 PROCEDURE — 6370000000 HC RX 637 (ALT 250 FOR IP): Performed by: INTERNAL MEDICINE

## 2022-07-18 PROCEDURE — 96409 CHEMO IV PUSH SNGL DRUG: CPT

## 2022-07-18 PROCEDURE — 96365 THER/PROPH/DIAG IV INF INIT: CPT

## 2022-07-18 PROCEDURE — 96367 TX/PROPH/DG ADDL SEQ IV INF: CPT

## 2022-07-18 RX ORDER — DIPHENHYDRAMINE HYDROCHLORIDE 50 MG/ML
25 INJECTION INTRAMUSCULAR; INTRAVENOUS ONCE
Status: CANCELLED | OUTPATIENT
Start: 2022-08-22

## 2022-07-18 RX ORDER — DOXORUBICIN HYDROCHLORIDE 2 MG/ML
50 INJECTION, SOLUTION INTRAVENOUS ONCE
Status: COMPLETED | OUTPATIENT
Start: 2022-07-18 | End: 2022-07-18

## 2022-07-18 RX ORDER — ACETAMINOPHEN 325 MG/1
650 TABLET ORAL ONCE
Status: COMPLETED | OUTPATIENT
Start: 2022-07-18 | End: 2022-07-18

## 2022-07-18 RX ORDER — SODIUM CHLORIDE 9 MG/ML
20 INJECTION, SOLUTION INTRAVENOUS ONCE
Status: CANCELLED | OUTPATIENT
Start: 2022-08-22 | End: 2022-08-08

## 2022-07-18 RX ORDER — DOXORUBICIN HYDROCHLORIDE 2 MG/ML
50 INJECTION, SOLUTION INTRAVENOUS ONCE
Status: CANCELLED | OUTPATIENT
Start: 2022-08-22

## 2022-07-18 RX ORDER — PALONOSETRON 0.05 MG/ML
0.25 INJECTION, SOLUTION INTRAVENOUS ONCE
Status: COMPLETED | OUTPATIENT
Start: 2022-07-18 | End: 2022-07-18

## 2022-07-18 RX ORDER — HEPARIN SODIUM (PORCINE) LOCK FLUSH IV SOLN 100 UNIT/ML 100 UNIT/ML
500 SOLUTION INTRAVENOUS PRN
Status: DISCONTINUED | OUTPATIENT
Start: 2022-07-18 | End: 2022-07-19 | Stop reason: HOSPADM

## 2022-07-18 RX ORDER — ACETAMINOPHEN 325 MG/1
650 TABLET ORAL
Status: CANCELLED | OUTPATIENT
Start: 2022-08-22

## 2022-07-18 RX ORDER — HEPARIN SODIUM (PORCINE) LOCK FLUSH IV SOLN 100 UNIT/ML 100 UNIT/ML
500 SOLUTION INTRAVENOUS PRN
Status: CANCELLED | OUTPATIENT
Start: 2022-08-08

## 2022-07-18 RX ORDER — ONDANSETRON 2 MG/ML
8 INJECTION INTRAMUSCULAR; INTRAVENOUS
Status: CANCELLED | OUTPATIENT
Start: 2022-08-22

## 2022-07-18 RX ORDER — MEPERIDINE HYDROCHLORIDE 50 MG/ML
12.5 INJECTION INTRAMUSCULAR; INTRAVENOUS; SUBCUTANEOUS PRN
Status: CANCELLED | OUTPATIENT
Start: 2022-08-22

## 2022-07-18 RX ORDER — EPINEPHRINE 1 MG/ML
0.3 INJECTION, SOLUTION, CONCENTRATE INTRAVENOUS PRN
Status: CANCELLED | OUTPATIENT
Start: 2022-08-22

## 2022-07-18 RX ORDER — DEXAMETHASONE SODIUM PHOSPHATE 10 MG/ML
10 INJECTION INTRAMUSCULAR; INTRAVENOUS ONCE
Status: COMPLETED | OUTPATIENT
Start: 2022-07-18 | End: 2022-07-18

## 2022-07-18 RX ORDER — SODIUM CHLORIDE 9 MG/ML
25 INJECTION, SOLUTION INTRAVENOUS PRN
Status: CANCELLED | OUTPATIENT
Start: 2022-08-22

## 2022-07-18 RX ORDER — SODIUM CHLORIDE 9 MG/ML
INJECTION, SOLUTION INTRAVENOUS CONTINUOUS
Status: CANCELLED | OUTPATIENT
Start: 2022-08-22

## 2022-07-18 RX ORDER — SODIUM CHLORIDE 0.9 % (FLUSH) 0.9 %
5-40 SYRINGE (ML) INJECTION PRN
Status: CANCELLED | OUTPATIENT
Start: 2022-08-08

## 2022-07-18 RX ORDER — DIPHENHYDRAMINE HYDROCHLORIDE 50 MG/ML
50 INJECTION INTRAMUSCULAR; INTRAVENOUS
Status: CANCELLED | OUTPATIENT
Start: 2022-08-22

## 2022-07-18 RX ORDER — ALBUTEROL SULFATE 90 UG/1
4 AEROSOL, METERED RESPIRATORY (INHALATION) PRN
Status: CANCELLED | OUTPATIENT
Start: 2022-08-22

## 2022-07-18 RX ORDER — FAMOTIDINE 10 MG/ML
20 INJECTION, SOLUTION INTRAVENOUS
Status: CANCELLED | OUTPATIENT
Start: 2022-08-22

## 2022-07-18 RX ORDER — SODIUM CHLORIDE 9 MG/ML
5-40 INJECTION INTRAVENOUS PRN
Status: CANCELLED | OUTPATIENT
Start: 2022-08-22

## 2022-07-18 RX ORDER — SODIUM CHLORIDE 0.9 % (FLUSH) 0.9 %
5-40 SYRINGE (ML) INJECTION PRN
Status: DISCONTINUED | OUTPATIENT
Start: 2022-07-18 | End: 2022-07-19 | Stop reason: HOSPADM

## 2022-07-18 RX ORDER — PREDNISONE 50 MG/1
TABLET ORAL
Qty: 10 TABLET | Refills: 1 | Status: SHIPPED | OUTPATIENT
Start: 2022-07-18 | End: 2022-08-22

## 2022-07-18 RX ORDER — SODIUM CHLORIDE 9 MG/ML
20 INJECTION, SOLUTION INTRAVENOUS ONCE
Status: COMPLETED | OUTPATIENT
Start: 2022-07-18 | End: 2022-07-18

## 2022-07-18 RX ORDER — DIPHENHYDRAMINE HYDROCHLORIDE 50 MG/ML
25 INJECTION INTRAMUSCULAR; INTRAVENOUS ONCE
Status: COMPLETED | OUTPATIENT
Start: 2022-07-18 | End: 2022-07-18

## 2022-07-18 RX ORDER — PALONOSETRON 0.05 MG/ML
0.25 INJECTION, SOLUTION INTRAVENOUS ONCE
Status: CANCELLED | OUTPATIENT
Start: 2022-08-22 | End: 2022-08-08

## 2022-07-18 RX ORDER — ACETAMINOPHEN 325 MG/1
650 TABLET ORAL ONCE
Status: CANCELLED | OUTPATIENT
Start: 2022-08-22

## 2022-07-18 RX ADMIN — PALONOSETRON 0.25 MG: 0.05 INJECTION, SOLUTION INTRAVENOUS at 09:22

## 2022-07-18 RX ADMIN — SODIUM CHLORIDE, PRESERVATIVE FREE 10 ML: 5 INJECTION INTRAVENOUS at 14:41

## 2022-07-18 RX ADMIN — PEGFILGRASTIM 6 MG: KIT SUBCUTANEOUS at 14:37

## 2022-07-18 RX ADMIN — SODIUM CHLORIDE 700 MG: 9 INJECTION, SOLUTION INTRAVENOUS at 10:32

## 2022-07-18 RX ADMIN — VINCRISTINE SULFATE 2 MG: 1 INJECTION, SOLUTION INTRAVENOUS at 13:52

## 2022-07-18 RX ADMIN — FOSAPREPITANT 150 MG: 150 INJECTION, POWDER, LYOPHILIZED, FOR SOLUTION INTRAVENOUS at 09:43

## 2022-07-18 RX ADMIN — ACETAMINOPHEN 650 MG: 325 TABLET ORAL at 09:22

## 2022-07-18 RX ADMIN — HEPARIN 500 UNITS: 100 SYRINGE at 14:41

## 2022-07-18 RX ADMIN — CYCLOPHOSPHAMIDE 1400 MG: 200 INJECTION, SOLUTION INTRAVENOUS at 12:23

## 2022-07-18 RX ADMIN — DOXORUBICIN HYDROCHLORIDE 94 MG: 2 INJECTION, SOLUTION INTRAVENOUS at 13:11

## 2022-07-18 RX ADMIN — DIPHENHYDRAMINE HYDROCHLORIDE 25 MG: 50 INJECTION, SOLUTION INTRAMUSCULAR; INTRAVENOUS at 09:22

## 2022-07-18 RX ADMIN — DEXAMETHASONE SODIUM PHOSPHATE 10 MG: 10 INJECTION INTRAMUSCULAR; INTRAVENOUS at 09:22

## 2022-07-18 RX ADMIN — SODIUM CHLORIDE 20 ML/HR: 9 INJECTION, SOLUTION INTRAVENOUS at 09:21

## 2022-07-18 ASSESSMENT — PAIN SCALES - GENERAL: PAINLEVEL_OUTOF10: 4
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
## 2022-07-18 NOTE — PROGRESS NOTES
Patient arrived ambulatory with daughter for C5D1 treatment and MD visit. Patient denies complaints or concerns. Port accessed; specimen sent. Echo reviewed from 4/11/22; EF 66%. Physician met with patient; labs and orders reviewed, ok to treat. Per MD and pharmacist, ok to infuse ruxience over 1.5 hours. Patient premedicated. Ruxience infusion begin at 100 ml/hr for 30 minutes, no reaction. Ruxience infusion increased to 200 ml/hr for remainder of infusion; no reaction; line flushed. Cytoxan infused with no sign of adverse reaction; line flushed. Adriamycin IVP completed via normal saline line while patient chews on ice throughout infusion; blood return present pre/mid/post IVPush; line flushed. Vincristine completed via free flowing normal saline line; blood return present pre/mid/post infusion. Line flushed  OBI placed to patient right arm. Educated patient on OBI and she verbalizes understanding. OBI was flashing green light prior to discharge from unit. Patient provided with OBI information card indicating medication to infuse 6pm tomorrow and she may then removed and discard OBI cartridge after 7pm tomorrow evening. Port flushed and heparinized with intact horne needle removed. Patient ambulated off unit with daughter at discharge; AVS per front office staff.

## 2022-07-22 DIAGNOSIS — C83.33 DIFFUSE LARGE B-CELL LYMPHOMA OF INTRA-ABDOMINAL LYMPH NODES (HCC): ICD-10-CM

## 2022-07-22 RX ORDER — OMEPRAZOLE 20 MG/1
CAPSULE, DELAYED RELEASE ORAL
Qty: 60 CAPSULE | Refills: 2 | Status: SHIPPED | OUTPATIENT
Start: 2022-07-22 | End: 2022-10-27

## 2022-07-25 ENCOUNTER — HOSPITAL ENCOUNTER (EMERGENCY)
Age: 72
Discharge: HOME | End: 2022-07-25
Attending: STUDENT IN AN ORGANIZED HEALTH CARE EDUCATION/TRAINING PROGRAM
Payer: MEDICARE

## 2022-07-25 ENCOUNTER — TELEPHONE (OUTPATIENT)
Dept: INFUSION THERAPY | Facility: MEDICAL CENTER | Age: 72
End: 2022-07-25

## 2022-07-25 VITALS
HEIGHT: 62 IN | WEIGHT: 169.9 LBS | BODY MASS INDEX: 31.27 KG/M2 | HEART RATE: 114 BPM | DIASTOLIC BLOOD PRESSURE: 53 MMHG | OXYGEN SATURATION: 99 % | TEMPERATURE: 98.1 F | SYSTOLIC BLOOD PRESSURE: 137 MMHG | RESPIRATION RATE: 16 BRPM

## 2022-07-25 DIAGNOSIS — B02.9 HERPES ZOSTER WITHOUT COMPLICATION: Primary | ICD-10-CM

## 2022-07-25 PROCEDURE — 99283 EMERGENCY DEPT VISIT LOW MDM: CPT

## 2022-07-25 PROCEDURE — 6370000000 HC RX 637 (ALT 250 FOR IP): Performed by: STUDENT IN AN ORGANIZED HEALTH CARE EDUCATION/TRAINING PROGRAM

## 2022-07-25 RX ORDER — ACYCLOVIR 800 MG/1
800 TABLET ORAL ONCE
Status: COMPLETED | OUTPATIENT
Start: 2022-07-25 | End: 2022-07-25

## 2022-07-25 RX ORDER — VALACYCLOVIR HYDROCHLORIDE 1 G/1
1000 TABLET, FILM COATED ORAL 3 TIMES DAILY
Qty: 21 TABLET | Refills: 0 | Status: SHIPPED | OUTPATIENT
Start: 2022-07-25 | End: 2022-08-01

## 2022-07-25 RX ADMIN — ACYCLOVIR 800 MG: 800 TABLET ORAL at 10:43

## 2022-07-25 ASSESSMENT — ENCOUNTER SYMPTOMS
SORE THROAT: 0
DIARRHEA: 0
EYE REDNESS: 0
VOMITING: 0
EYE DISCHARGE: 0
SHORTNESS OF BREATH: 0
RHINORRHEA: 0
NAUSEA: 0
ABDOMINAL PAIN: 0

## 2022-07-25 ASSESSMENT — PAIN - FUNCTIONAL ASSESSMENT: PAIN_FUNCTIONAL_ASSESSMENT: NONE - DENIES PAIN

## 2022-07-25 NOTE — ED NOTES
Discharge instructions reviewed with patient, noting all directions and education by provider. Patient verbalizes understanding of all information reviewed, gathered personal items, and transferred under own power off unit to lobby without incident.      William Blevins RN  07/25/22 0348

## 2022-07-25 NOTE — TELEPHONE ENCOUNTER
PT CALLING TO SAY GOING TO ER, FOR RED STREAKS AND PAIN TO LEFT SIDE. WRITER CHECKED Epic AND PT TO [facility] AND IN ER FOR SHINGLES.     NOTE TO DR Christine Nayak Arian Lai(Attending)
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
## 2022-07-25 NOTE — ED NOTES
Mode of arrival (squad #, walk in, police, etc) : walk in        Chief complaint(s): Rash        Arrival Note (brief scenario, treatment PTA, etc). : Pt here for eval of rash to her left lower side that she noticed this morning. Pt states the rash is painful. C= \"Have you ever felt that you should Cut down on your drinking? \"  No  A= \"Have people Annoyed you by criticizing your drinking? \"  No  G= \"Have you ever felt bad or Guilty about your drinking? \"  No  E= \"Have you ever had a drink as an Eye-opener first thing in the morning to steady your nerves or to help a hangover? \"  No      Deferred []      Reason for deferring: N/A    *If yes to two or more: probable alcohol abuse. *       Fiona Mcintyre  07/25/22 0228

## 2022-07-26 ENCOUNTER — TELEPHONE (OUTPATIENT)
Dept: FAMILY MEDICINE CLINIC | Age: 72
End: 2022-07-26

## 2022-07-26 NOTE — TELEPHONE ENCOUNTER
[facility]) ED Follow up Call    Reason for ED visit:   RUBI HUGHES appts/Provider:    Future Appointments   Date Time Provider Aleida Desir   8/8/2022  8:00 AM [facility] CHAIR 10 [facility] [place]   8/8/2022  9:00 AM Domi Carlos MD  Cancer Ct [facility]   9/29/2022  7:30 [facility]   7/3/2023  7:30 AM Christine Parrish MD [facility]     PHONE NUMBER RINGS BUSY UNABLE TO REACH PATIENT  VOICEMAIL DOCUMENTATION - ERASE IF NOT USED  Hi, this message is for    This is  from [place] office. Just calling to see how you are doing after your recent visit to the Emergency Room. [facility] wants to make sure you were able to fill any prescriptions and that you understand your discharge instructions. Please return our call if you need to make a follow up appointment with your provider or have any further needs. Our phone number is --*. Have a great day.

## 2022-07-26 NOTE — TELEPHONE ENCOUNTER
----- Message from Yamile Tim sent at 7/25/2022  1:25 PM EDT -----  Subject: Appointment Request    Reason for Call: Established Patient Appointment needed: Routine ED Follow   Up Visit    QUESTIONS    Reason for appointment request? Available appointments did not meet   patient need     Additional Information for Provider? Patient was seen in the ED and needs   a follow up within one day from being diagnosed with Shingles and there   was no available appointment to schedule. There was a appointment   available today but patient did not want today.  Please call patient to   schedule.   ---------------------------------------------------------------------------  --------------  Kimmy Camargo INFO  [phone]; OK to leave message on voicemail  ---------------------------------------------------------------------------  --------------  SCRIPT ANSWERS  COVID Screen: Tiera Lu

## 2022-07-28 ENCOUNTER — CLINICAL DOCUMENTATION (OUTPATIENT)
Dept: SPIRITUAL SERVICES | Age: 72
End: 2022-07-28

## 2022-07-28 NOTE — ACP (ADVANCE CARE PLANNING)
Advance Care Planning   Ambulatory ACP Specialist Patient Outreach    Date:  7/28/2022  ACP Specialist:  Demond Elliott    Outreach call to patient in follow-up to ACP Specialist referral from: Altagracia Freitas MD    [x] PCP  [] Provider   [] Ambulatory Care Management [] Other for Reason:    [x] Advance Directive Assistance  [] Code Status Discussion  [] Complete Portable DNR Order  [] Discuss Goals of Care  [] Complete POST/MOST  [] Early ACP Decision-Making  [] Other    Date Referral Received:6/29/22    Today's Outreach:  [] First   [] Second  [x] Third                               Third outreach made by [x]  phone  [x] email [x]   fitaborate     Intervention:  [] Spoke with Patient  [x] Left VM requesting return call      Outcome: Third Outreach. Left detailed VM for Patient to return call. Sent Closure Letter via Email w/ACP Packet included. [employer]. Next Step:   [] ACP scheduled conversation  [] Outreach again in one week               [x] Email / Mail ACP Info Sheets  [x] Email / Mail Advance Directive            [x] Close Referral. Routing closure to referring provider/staff and to ACP Specialist . [] Closure Letter mailed to Patient with Invitation to Contact ACP Specialist if/when ready.     Thank you for this referral.

## 2022-08-01 ENCOUNTER — TELEPHONE (OUTPATIENT)
Dept: INFUSION THERAPY | Facility: MEDICAL CENTER | Age: 72
End: 2022-08-01

## 2022-08-01 NOTE — TELEPHONE ENCOUNTER
WRITER CALLED PT TO ASSESS, DUE TO DTR CALLS WITH PT NEEDS ASSISTANCE, DUE TO UNCONTROLLED PAIN. PT SOUNDS CALM AND STATES USING NORCO AND IBUPROFEN AND IS DOING OK, JUST IS PAINFUL TO \"CENTER OF BACK AROUND TO LEFT SIDE\". STATES IS TAKING VALTREX AND WEARING LOOSE CLOTHING, NOTIFIED HELPS IF NOTHING IS TOUCHING AREA, USUALLY. PT STATES UNDERSTANDING AND NOTIFIED MAY WANT TO LISET HER DTR, DUE TO DTR SOUNDED CONCERNED REGARDING HER PAIN COMPLAINTS. PT STATES WILL CALL TO REASSURE HER DTR, PAIN IS BEING MANAGED.

## 2022-08-02 ENCOUNTER — TELEPHONE (OUTPATIENT)
Dept: ONCOLOGY | Age: 72
End: 2022-08-02

## 2022-08-02 NOTE — TELEPHONE ENCOUNTER
[facility] Nutrition Note:    Final attempt made to contact pt for nutrition f/u. No answer. Left detailed message for call back. Will remain available as needed.      Toni Bahena MS, RD, LD  Registered Dietitian  [facility]  [phone]

## 2022-08-03 ENCOUNTER — TELEPHONE (OUTPATIENT)
Dept: ONCOLOGY | Age: 72
End: 2022-08-03

## 2022-08-03 NOTE — TELEPHONE ENCOUNTER
Name: Lety Chaudhari  : 1950  MRN: [id number]    Oncology Navigation Follow-Up Note    Contact Type:  Telephone    Notes: Attempted to contact pt for ONN f/u. No answer, VM left encouraging pt to return writer's call with any concerns or barriers they may have. Reminded pt about upcoming appt on . Provided writer's contact information in message. Will continue to follow.      Electronically signed by Reynaldo Vanegas RN on 8/3/2022 at 8:48 AM

## 2022-08-05 ENCOUNTER — HOSPITAL ENCOUNTER (OUTPATIENT)
Facility: MEDICAL CENTER | Age: 72
End: 2022-08-05
Payer: MEDICARE

## 2022-08-08 ENCOUNTER — OFFICE VISIT (OUTPATIENT)
Dept: ONCOLOGY | Age: 72
End: 2022-08-08
Payer: MEDICARE

## 2022-08-08 ENCOUNTER — TELEPHONE (OUTPATIENT)
Dept: ONCOLOGY | Age: 72
End: 2022-08-08

## 2022-08-08 ENCOUNTER — HOSPITAL ENCOUNTER (OUTPATIENT)
Dept: INFUSION THERAPY | Facility: MEDICAL CENTER | Age: 72
Discharge: HOME | End: 2022-08-08
Payer: MEDICARE

## 2022-08-08 VITALS
WEIGHT: 164.2 LBS | BODY MASS INDEX: 30.03 KG/M2 | TEMPERATURE: 97.6 F | DIASTOLIC BLOOD PRESSURE: 66 MMHG | SYSTOLIC BLOOD PRESSURE: 134 MMHG | HEART RATE: 128 BPM | RESPIRATION RATE: 16 BRPM

## 2022-08-08 DIAGNOSIS — C83.33 DIFFUSE LARGE B-CELL LYMPHOMA OF INTRA-ABDOMINAL LYMPH NODES (HCC): Primary | ICD-10-CM

## 2022-08-08 DIAGNOSIS — G89.3 CANCER ASSOCIATED PAIN: ICD-10-CM

## 2022-08-08 DIAGNOSIS — Z51.11 ENCOUNTER FOR CHEMOTHERAPY MANAGEMENT: ICD-10-CM

## 2022-08-08 LAB
ABSOLUTE EOS #: 0.05 K/UL (ref 0–0.44)
ABSOLUTE IMMATURE GRANULOCYTE: 0.04 K/UL (ref 0–0.3)
ABSOLUTE LYMPH #: 1.12 K/UL (ref 1.1–3.7)
ABSOLUTE MONO #: 1.27 K/UL (ref 0.1–1.2)
ALBUMIN SERPL-MCNC: 3.9 G/DL (ref 3.5–5.2)
ALP BLD-CCNC: 80 U/L (ref 35–104)
ALT SERPL-CCNC: 12 U/L (ref 5–33)
ANION GAP SERPL CALCULATED.3IONS-SCNC: 10 MMOL/L (ref 9–17)
AST SERPL-CCNC: 16 U/L
BASOPHILS # BLD: 0 % (ref 0–2)
BASOPHILS ABSOLUTE: <0.03 K/UL (ref 0–0.2)
BILIRUB SERPL-MCNC: 0.25 MG/DL (ref 0.3–1.2)
BUN BLDV-MCNC: 13 MG/DL (ref 8–23)
BUN/CREAT BLD: 21 (ref 9–20)
CALCIUM SERPL-MCNC: 9.7 MG/DL (ref 8.6–10.4)
CHLORIDE BLD-SCNC: 105 MMOL/L (ref 98–107)
CO2: 27 MMOL/L (ref 20–31)
CREAT SERPL-MCNC: 0.63 MG/DL (ref 0.5–0.9)
EOSINOPHILS RELATIVE PERCENT: 1 % (ref 1–4)
GFR AFRICAN AMERICAN: >60 ML/MIN
GFR NON-AFRICAN AMERICAN: >60 ML/MIN
GFR SERPL CREATININE-BSD FRML MDRD: ABNORMAL ML/MIN/{1.73_M2}
GLUCOSE BLD-MCNC: 107 MG/DL (ref 70–99)
HCT VFR BLD CALC: 36.4 % (ref 36.3–47.1)
HEMOGLOBIN: 11.4 G/DL (ref 11.9–15.1)
IMMATURE GRANULOCYTES: 1 %
LYMPHOCYTES # BLD: 18 % (ref 24–43)
MCH RBC QN AUTO: 32.5 PG (ref 25.2–33.5)
MCHC RBC AUTO-ENTMCNC: 31.3 G/DL (ref 28.4–34.8)
MCV RBC AUTO: 103.7 FL (ref 82.6–102.9)
MONOCYTES # BLD: 21 % (ref 3–12)
NRBC AUTOMATED: 0 PER 100 WBC
PDW BLD-RTO: 15.2 % (ref 11.8–14.4)
PLATELET # BLD: 406 K/UL (ref 138–453)
PMV BLD AUTO: 9 FL (ref 8.1–13.5)
POTASSIUM SERPL-SCNC: 3.7 MMOL/L (ref 3.7–5.3)
RBC # BLD: 3.51 M/UL (ref 3.95–5.11)
RBC # BLD: ABNORMAL 10*6/UL
SEG NEUTROPHILS: 59 % (ref 36–65)
SEGMENTED NEUTROPHILS ABSOLUTE COUNT: 3.64 K/UL (ref 1.5–8.1)
SODIUM BLD-SCNC: 142 MMOL/L (ref 135–144)
TOTAL PROTEIN: 6.4 G/DL (ref 6.4–8.3)
WBC # BLD: 6.1 K/UL (ref 3.5–11.3)

## 2022-08-08 PROCEDURE — 1090F PRES/ABSN URINE INCON ASSESS: CPT | Performed by: INTERNAL MEDICINE

## 2022-08-08 PROCEDURE — 99214 OFFICE O/P EST MOD 30 MIN: CPT | Performed by: INTERNAL MEDICINE

## 2022-08-08 PROCEDURE — G8427 DOCREV CUR MEDS BY ELIG CLIN: HCPCS | Performed by: INTERNAL MEDICINE

## 2022-08-08 PROCEDURE — 36591 DRAW BLOOD OFF VENOUS DEVICE: CPT

## 2022-08-08 PROCEDURE — 99211 OFF/OP EST MAY X REQ PHY/QHP: CPT | Performed by: INTERNAL MEDICINE

## 2022-08-08 PROCEDURE — 85025 COMPLETE CBC W/AUTO DIFF WBC: CPT

## 2022-08-08 PROCEDURE — G8417 CALC BMI ABV UP PARAM F/U: HCPCS | Performed by: INTERNAL MEDICINE

## 2022-08-08 PROCEDURE — G8399 PT W/DXA RESULTS DOCUMENT: HCPCS | Performed by: INTERNAL MEDICINE

## 2022-08-08 PROCEDURE — 1036F TOBACCO NON-USER: CPT | Performed by: INTERNAL MEDICINE

## 2022-08-08 PROCEDURE — 3017F COLORECTAL CA SCREEN DOC REV: CPT | Performed by: INTERNAL MEDICINE

## 2022-08-08 PROCEDURE — 2580000003 HC RX 258: Performed by: INTERNAL MEDICINE

## 2022-08-08 PROCEDURE — 80053 COMPREHEN METABOLIC PANEL: CPT

## 2022-08-08 PROCEDURE — 6360000002 HC RX W HCPCS: Performed by: INTERNAL MEDICINE

## 2022-08-08 PROCEDURE — 1123F ACP DISCUSS/DSCN MKR DOCD: CPT | Performed by: INTERNAL MEDICINE

## 2022-08-08 RX ORDER — HYDROCODONE BITARTRATE AND ACETAMINOPHEN 5; 325 MG/1; MG/1
1 TABLET ORAL EVERY 8 HOURS PRN
Qty: 90 TABLET | Refills: 0 | Status: SHIPPED | OUTPATIENT
Start: 2022-08-08 | End: 2022-09-19 | Stop reason: SDUPTHER

## 2022-08-08 RX ORDER — HEPARIN SODIUM (PORCINE) LOCK FLUSH IV SOLN 100 UNIT/ML 100 UNIT/ML
500 SOLUTION INTRAVENOUS PRN
Status: DISCONTINUED | OUTPATIENT
Start: 2022-08-08 | End: 2022-08-09 | Stop reason: HOSPADM

## 2022-08-08 RX ORDER — SODIUM CHLORIDE 0.9 % (FLUSH) 0.9 %
5-40 SYRINGE (ML) INJECTION PRN
Status: DISCONTINUED | OUTPATIENT
Start: 2022-08-08 | End: 2022-08-09 | Stop reason: HOSPADM

## 2022-08-08 RX ORDER — ONDANSETRON 8 MG/1
8 TABLET, ORALLY DISINTEGRATING ORAL EVERY 8 HOURS PRN
Qty: 30 TABLET | Refills: 3 | Status: SHIPPED | OUTPATIENT
Start: 2022-08-08

## 2022-08-08 RX ADMIN — HEPARIN 500 UNITS: 100 SYRINGE at 10:25

## 2022-08-08 RX ADMIN — SODIUM CHLORIDE, PRESERVATIVE FREE 10 ML: 5 INJECTION INTRAVENOUS at 10:25

## 2022-08-08 NOTE — PROGRESS NOTES
Patient arrivefor cycle 6 day 1 treatment and physician visit. Pt states that she has shingles and has been feeling very tired ,nauseous and vomiting on and off for the past 3 weeks . Pt rash is in the left flank area , scabbing over at this time . Denies ani other complaint or concern. Vitals obtained  Port accessed; specimen sent. Labs reviewed  Physician met with patient, will hold treatment for 2 weeks   Port flushed and heparinized with intact horne needle removed per protocol. Patient  discharge.

## 2022-08-08 NOTE — TELEPHONE ENCOUNTER
Augustina Fletcher FOR MD VISIT & TX  DR TAYLOR IN TO SEE PATIENT  ORDERS RECEIVED  TX HOLD FOR 2 WEEKS DUE TO ILDEFONSO SHIRLEY VISIT 08/22/22 @9AM Jackie@[employer]  AVS PRINTED AND GIVEN TO PATIENT WITH INSTRUCTIONS  PATIENT REMAINS IN [address] [place] [address]
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
## 2022-08-17 ENCOUNTER — TELEPHONE (OUTPATIENT)
Dept: ONCOLOGY | Age: 72
End: 2022-08-17

## 2022-08-17 NOTE — TELEPHONE ENCOUNTER
Name: Sameera Goss  : 1950  MRN: [id number]    Oncology Navigation Follow-Up Note    Contact Type:  Telephone    Notes: Attempted to contact pt for ONN f/u. No answer, VM left encouraging pt to return writer's call with any concerns or barriers they may have. Provided writer's contact information in message. Will continue to follow.      Electronically signed by Artem Ramos RN on 2022 at 3:29 PM

## 2022-08-18 ENCOUNTER — TELEPHONE (OUTPATIENT)
Dept: INFUSION THERAPY | Facility: MEDICAL CENTER | Age: 72
End: 2022-08-18

## 2022-08-18 NOTE — TELEPHONE ENCOUNTER
Returned call to patient. She is constipated and has not had BM for 4 days. She said she took a mild laxative without success. Advised patient to try over the counter Senakot S and or Dulcolax tablet. If no results she should call back. Patient voices understanding.

## 2022-08-19 ENCOUNTER — HOSPITAL ENCOUNTER (OUTPATIENT)
Facility: MEDICAL CENTER | Age: 72
End: 2022-08-19
Payer: MEDICARE

## 2022-08-22 ENCOUNTER — OFFICE VISIT (OUTPATIENT)
Dept: ONCOLOGY | Age: 72
End: 2022-08-22
Payer: MEDICARE

## 2022-08-22 ENCOUNTER — HOSPITAL ENCOUNTER (OUTPATIENT)
Dept: INFUSION THERAPY | Facility: MEDICAL CENTER | Age: 72
Discharge: HOME | End: 2022-08-22
Payer: MEDICARE

## 2022-08-22 ENCOUNTER — TELEPHONE (OUTPATIENT)
Dept: ONCOLOGY | Age: 72
End: 2022-08-22

## 2022-08-22 VITALS
RESPIRATION RATE: 16 BRPM | TEMPERATURE: 98.2 F | SYSTOLIC BLOOD PRESSURE: 118 MMHG | HEART RATE: 91 BPM | BODY MASS INDEX: 29.69 KG/M2 | DIASTOLIC BLOOD PRESSURE: 47 MMHG | WEIGHT: 162.3 LBS

## 2022-08-22 VITALS
DIASTOLIC BLOOD PRESSURE: 47 MMHG | BODY MASS INDEX: 29.69 KG/M2 | HEART RATE: 91 BPM | WEIGHT: 162.3 LBS | RESPIRATION RATE: 16 BRPM | TEMPERATURE: 98.2 F | SYSTOLIC BLOOD PRESSURE: 118 MMHG

## 2022-08-22 DIAGNOSIS — Z51.11 ENCOUNTER FOR CHEMOTHERAPY MANAGEMENT: ICD-10-CM

## 2022-08-22 DIAGNOSIS — G89.3 CANCER ASSOCIATED PAIN: ICD-10-CM

## 2022-08-22 DIAGNOSIS — R63.4 WEIGHT LOSS: ICD-10-CM

## 2022-08-22 DIAGNOSIS — C83.33 DIFFUSE LARGE B-CELL LYMPHOMA OF INTRA-ABDOMINAL LYMPH NODES (HCC): Primary | ICD-10-CM

## 2022-08-22 LAB
ABSOLUTE EOS #: 0.35 K/UL (ref 0–0.44)
ABSOLUTE IMMATURE GRANULOCYTE: 0.01 K/UL (ref 0–0.3)
ABSOLUTE LYMPH #: 1.4 K/UL (ref 1.1–3.7)
ABSOLUTE MONO #: 0.86 K/UL (ref 0.1–1.2)
ALBUMIN SERPL-MCNC: 3.9 G/DL (ref 3.5–5.2)
ALP BLD-CCNC: 66 U/L (ref 35–104)
ALT SERPL-CCNC: 17 U/L (ref 5–33)
ANION GAP SERPL CALCULATED.3IONS-SCNC: 9 MMOL/L (ref 9–17)
AST SERPL-CCNC: 25 U/L
BASOPHILS # BLD: 0 % (ref 0–2)
BASOPHILS ABSOLUTE: <0.03 K/UL (ref 0–0.2)
BILIRUB SERPL-MCNC: 0.28 MG/DL (ref 0.3–1.2)
BUN BLDV-MCNC: 11 MG/DL (ref 8–23)
BUN/CREAT BLD: 18 (ref 9–20)
CALCIUM SERPL-MCNC: 9.8 MG/DL (ref 8.6–10.4)
CHLORIDE BLD-SCNC: 102 MMOL/L (ref 98–107)
CO2: 28 MMOL/L (ref 20–31)
CREAT SERPL-MCNC: 0.6 MG/DL (ref 0.5–0.9)
EOSINOPHILS RELATIVE PERCENT: 8 % (ref 1–4)
GFR AFRICAN AMERICAN: >60 ML/MIN
GFR NON-AFRICAN AMERICAN: >60 ML/MIN
GFR SERPL CREATININE-BSD FRML MDRD: ABNORMAL ML/MIN/{1.73_M2}
GLUCOSE BLD-MCNC: 93 MG/DL (ref 70–99)
HCT VFR BLD CALC: 38.1 % (ref 36.3–47.1)
HEMOGLOBIN: 12.3 G/DL (ref 11.9–15.1)
IMMATURE GRANULOCYTES: 0 %
LYMPHOCYTES # BLD: 31 % (ref 24–43)
MCH RBC QN AUTO: 32.4 PG (ref 25.2–33.5)
MCHC RBC AUTO-ENTMCNC: 32.3 G/DL (ref 28.4–34.8)
MCV RBC AUTO: 100.3 FL (ref 82.6–102.9)
MONOCYTES # BLD: 19 % (ref 3–12)
NRBC AUTOMATED: 0 PER 100 WBC
PDW BLD-RTO: 13.7 % (ref 11.8–14.4)
PLATELET # BLD: 189 K/UL (ref 138–453)
PMV BLD AUTO: 10 FL (ref 8.1–13.5)
POTASSIUM SERPL-SCNC: 4.1 MMOL/L (ref 3.7–5.3)
RBC # BLD: 3.8 M/UL (ref 3.95–5.11)
SEG NEUTROPHILS: 42 % (ref 36–65)
SEGMENTED NEUTROPHILS ABSOLUTE COUNT: 1.87 K/UL (ref 1.5–8.1)
SODIUM BLD-SCNC: 139 MMOL/L (ref 135–144)
TOTAL PROTEIN: 6.3 G/DL (ref 6.4–8.3)
WBC # BLD: 4.5 K/UL (ref 3.5–11.3)

## 2022-08-22 PROCEDURE — G8399 PT W/DXA RESULTS DOCUMENT: HCPCS | Performed by: INTERNAL MEDICINE

## 2022-08-22 PROCEDURE — 2580000003 HC RX 258: Performed by: INTERNAL MEDICINE

## 2022-08-22 PROCEDURE — 96413 CHEMO IV INFUSION 1 HR: CPT

## 2022-08-22 PROCEDURE — 99215 OFFICE O/P EST HI 40 MIN: CPT | Performed by: INTERNAL MEDICINE

## 2022-08-22 PROCEDURE — 6360000002 HC RX W HCPCS: Performed by: INTERNAL MEDICINE

## 2022-08-22 PROCEDURE — 96411 CHEMO IV PUSH ADDL DRUG: CPT

## 2022-08-22 PROCEDURE — 96415 CHEMO IV INFUSION ADDL HR: CPT

## 2022-08-22 PROCEDURE — 1123F ACP DISCUSS/DSCN MKR DOCD: CPT | Performed by: INTERNAL MEDICINE

## 2022-08-22 PROCEDURE — 36591 DRAW BLOOD OFF VENOUS DEVICE: CPT

## 2022-08-22 PROCEDURE — G8427 DOCREV CUR MEDS BY ELIG CLIN: HCPCS | Performed by: INTERNAL MEDICINE

## 2022-08-22 PROCEDURE — 96367 TX/PROPH/DG ADDL SEQ IV INF: CPT

## 2022-08-22 PROCEDURE — 3017F COLORECTAL CA SCREEN DOC REV: CPT | Performed by: INTERNAL MEDICINE

## 2022-08-22 PROCEDURE — 1036F TOBACCO NON-USER: CPT | Performed by: INTERNAL MEDICINE

## 2022-08-22 PROCEDURE — 6370000000 HC RX 637 (ALT 250 FOR IP): Performed by: INTERNAL MEDICINE

## 2022-08-22 PROCEDURE — 96375 TX/PRO/DX INJ NEW DRUG ADDON: CPT

## 2022-08-22 PROCEDURE — G8417 CALC BMI ABV UP PARAM F/U: HCPCS | Performed by: INTERNAL MEDICINE

## 2022-08-22 PROCEDURE — 96377 APPLICATON ON-BODY INJECTOR: CPT

## 2022-08-22 PROCEDURE — 1090F PRES/ABSN URINE INCON ASSESS: CPT | Performed by: INTERNAL MEDICINE

## 2022-08-22 PROCEDURE — 96417 CHEMO IV INFUS EACH ADDL SEQ: CPT

## 2022-08-22 PROCEDURE — 80053 COMPREHEN METABOLIC PANEL: CPT

## 2022-08-22 PROCEDURE — 85025 COMPLETE CBC W/AUTO DIFF WBC: CPT

## 2022-08-22 PROCEDURE — 99211 OFF/OP EST MAY X REQ PHY/QHP: CPT | Performed by: INTERNAL MEDICINE

## 2022-08-22 RX ORDER — PALONOSETRON 0.05 MG/ML
0.25 INJECTION, SOLUTION INTRAVENOUS ONCE
Status: COMPLETED | OUTPATIENT
Start: 2022-08-22 | End: 2022-08-22

## 2022-08-22 RX ORDER — ACYCLOVIR 400 MG/1
400 TABLET ORAL 2 TIMES DAILY
Qty: 20 TABLET | Refills: 0 | Status: SHIPPED | OUTPATIENT
Start: 2022-08-22 | End: 2022-09-01

## 2022-08-22 RX ORDER — DOXORUBICIN HYDROCHLORIDE 2 MG/ML
50 INJECTION, SOLUTION INTRAVENOUS ONCE
Status: COMPLETED | OUTPATIENT
Start: 2022-08-22 | End: 2022-08-22

## 2022-08-22 RX ORDER — DIPHENHYDRAMINE HYDROCHLORIDE 50 MG/ML
25 INJECTION INTRAMUSCULAR; INTRAVENOUS ONCE
Status: COMPLETED | OUTPATIENT
Start: 2022-08-22 | End: 2022-08-22

## 2022-08-22 RX ORDER — SODIUM CHLORIDE 9 MG/ML
5-40 INJECTION INTRAVENOUS PRN
Status: DISCONTINUED | OUTPATIENT
Start: 2022-08-22 | End: 2022-08-23 | Stop reason: HOSPADM

## 2022-08-22 RX ORDER — HEPARIN SODIUM (PORCINE) LOCK FLUSH IV SOLN 100 UNIT/ML 100 UNIT/ML
500 SOLUTION INTRAVENOUS PRN
Status: DISPENSED | OUTPATIENT
Start: 2022-08-22 | End: 2022-08-22

## 2022-08-22 RX ORDER — DEXAMETHASONE SODIUM PHOSPHATE 10 MG/ML
10 INJECTION INTRAMUSCULAR; INTRAVENOUS ONCE
Status: COMPLETED | OUTPATIENT
Start: 2022-08-22 | End: 2022-08-22

## 2022-08-22 RX ORDER — PREDNISONE 50 MG/1
50 TABLET ORAL DAILY
Qty: 5 TABLET | Refills: 0 | Status: SHIPPED | OUTPATIENT
Start: 2022-08-22 | End: 2022-08-27

## 2022-08-22 RX ORDER — ACETAMINOPHEN 325 MG/1
650 TABLET ORAL ONCE
Status: COMPLETED | OUTPATIENT
Start: 2022-08-22 | End: 2022-08-22

## 2022-08-22 RX ORDER — SODIUM CHLORIDE 9 MG/ML
20 INJECTION, SOLUTION INTRAVENOUS ONCE
Status: COMPLETED | OUTPATIENT
Start: 2022-08-22 | End: 2022-08-22

## 2022-08-22 RX ADMIN — DEXAMETHASONE SODIUM PHOSPHATE 10 MG: 10 INJECTION INTRAMUSCULAR; INTRAVENOUS at 10:31

## 2022-08-22 RX ADMIN — FOSAPREPITANT 150 MG: 150 INJECTION, POWDER, LYOPHILIZED, FOR SOLUTION INTRAVENOUS at 10:34

## 2022-08-22 RX ADMIN — SODIUM CHLORIDE, PRESERVATIVE FREE 10 ML: 5 INJECTION INTRAVENOUS at 14:10

## 2022-08-22 RX ADMIN — HEPARIN 500 UNITS: 100 SYRINGE at 14:10

## 2022-08-22 RX ADMIN — CYCLOPHOSPHAMIDE 1400 MG: 200 INJECTION, SOLUTION INTRAVENOUS at 13:21

## 2022-08-22 RX ADMIN — PALONOSETRON 0.25 MG: 0.05 INJECTION, SOLUTION INTRAVENOUS at 10:30

## 2022-08-22 RX ADMIN — SODIUM CHLORIDE 700 MG: 9 INJECTION, SOLUTION INTRAVENOUS at 11:18

## 2022-08-22 RX ADMIN — PEGFILGRASTIM 6 MG: KIT SUBCUTANEOUS at 14:15

## 2022-08-22 RX ADMIN — SODIUM CHLORIDE, PRESERVATIVE FREE 10 ML: 5 INJECTION INTRAVENOUS at 08:50

## 2022-08-22 RX ADMIN — ACETAMINOPHEN 650 MG: 325 TABLET ORAL at 10:31

## 2022-08-22 RX ADMIN — SODIUM CHLORIDE 20 ML/HR: 9 INJECTION, SOLUTION INTRAVENOUS at 10:30

## 2022-08-22 RX ADMIN — DIPHENHYDRAMINE HYDROCHLORIDE 25 MG: 50 INJECTION, SOLUTION INTRAMUSCULAR; INTRAVENOUS at 10:30

## 2022-08-22 RX ADMIN — VINCRISTINE SULFATE 2 MG: 1 INJECTION, SOLUTION INTRAVENOUS at 13:09

## 2022-08-22 RX ADMIN — DOXORUBICIN HYDROCHLORIDE 94 MG: 2 INJECTION, SOLUTION INTRAVENOUS at 13:01
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
## 2022-08-22 NOTE — TELEPHONE ENCOUNTER
Taylor Sung MD VISIT & TX  Today   Ct pet in 3 weeks   Rv in 4 weeks   PET/CT IS ON 9/12/22 @ 9AM IN [facility] ARRIVAL @ 7:30AM  MD VISIT 9/19/22 @ 8:15AM  SCRIPTS SENT TO PT PHARMACY  AVS PRINTED W/ INSTRUCTIONS AND GIVEN TO PT ON EXIT
[employer]act

## 2022-09-12 ENCOUNTER — HOSPITAL ENCOUNTER (OUTPATIENT)
Dept: NUCLEAR MEDICINE | Age: 72
Discharge: HOME | End: 2022-09-14
Payer: MEDICARE

## 2022-09-12 ENCOUNTER — TELEPHONE (OUTPATIENT)
Dept: ONCOLOGY | Age: 72
End: 2022-09-12

## 2022-09-12 DIAGNOSIS — C83.33 DIFFUSE LARGE B-CELL LYMPHOMA OF INTRA-ABDOMINAL LYMPH NODES (HCC): ICD-10-CM

## 2022-09-12 LAB — GLUCOSE BLD-MCNC: 135 MG/DL (ref 65–105)

## 2022-09-12 PROCEDURE — 2580000003 HC RX 258: Performed by: INTERNAL MEDICINE

## 2022-09-12 PROCEDURE — A9552 F18 FDG: HCPCS | Performed by: INTERNAL MEDICINE

## 2022-09-12 PROCEDURE — 78815 PET IMAGE W/CT SKULL-THIGH: CPT

## 2022-09-12 PROCEDURE — 82947 ASSAY GLUCOSE BLOOD QUANT: CPT

## 2022-09-12 PROCEDURE — 3430000000 HC RX DIAGNOSTIC RADIOPHARMACEUTICAL: Performed by: INTERNAL MEDICINE

## 2022-09-12 RX ORDER — FLUDEOXYGLUCOSE F 18 200 MCI/ML
10 INJECTION, SOLUTION INTRAVENOUS
Status: COMPLETED | OUTPATIENT
Start: 2022-09-12 | End: 2022-09-12

## 2022-09-12 RX ORDER — SODIUM CHLORIDE 0.9 % (FLUSH) 0.9 %
10 SYRINGE (ML) INJECTION ONCE
Status: COMPLETED | OUTPATIENT
Start: 2022-09-12 | End: 2022-09-12

## 2022-09-12 RX ADMIN — SODIUM CHLORIDE, PRESERVATIVE FREE 10 ML: 5 INJECTION INTRAVENOUS at 07:48

## 2022-09-12 RX ADMIN — FLUDEOXYGLUCOSE F 18 9.6 MILLICURIE: 200 INJECTION, SOLUTION INTRAVENOUS at 07:47

## 2022-09-19 ENCOUNTER — TELEPHONE (OUTPATIENT)
Dept: ONCOLOGY | Age: 72
End: 2022-09-19

## 2022-09-19 ENCOUNTER — OFFICE VISIT (OUTPATIENT)
Dept: ONCOLOGY | Age: 72
End: 2022-09-19
Payer: MEDICARE

## 2022-09-19 VITALS
SYSTOLIC BLOOD PRESSURE: 131 MMHG | TEMPERATURE: 97.7 F | DIASTOLIC BLOOD PRESSURE: 79 MMHG | WEIGHT: 156.9 LBS | BODY MASS INDEX: 28.7 KG/M2 | HEART RATE: 105 BPM | RESPIRATION RATE: 18 BRPM

## 2022-09-19 DIAGNOSIS — C83.33 DIFFUSE LARGE B-CELL LYMPHOMA OF INTRA-ABDOMINAL LYMPH NODES (HCC): Primary | ICD-10-CM

## 2022-09-19 DIAGNOSIS — G89.3 CANCER ASSOCIATED PAIN: ICD-10-CM

## 2022-09-19 DIAGNOSIS — Z51.11 ENCOUNTER FOR CHEMOTHERAPY MANAGEMENT: ICD-10-CM

## 2022-09-19 PROCEDURE — 3017F COLORECTAL CA SCREEN DOC REV: CPT | Performed by: INTERNAL MEDICINE

## 2022-09-19 PROCEDURE — 99215 OFFICE O/P EST HI 40 MIN: CPT | Performed by: INTERNAL MEDICINE

## 2022-09-19 PROCEDURE — 1090F PRES/ABSN URINE INCON ASSESS: CPT | Performed by: INTERNAL MEDICINE

## 2022-09-19 PROCEDURE — G8427 DOCREV CUR MEDS BY ELIG CLIN: HCPCS | Performed by: INTERNAL MEDICINE

## 2022-09-19 PROCEDURE — G8417 CALC BMI ABV UP PARAM F/U: HCPCS | Performed by: INTERNAL MEDICINE

## 2022-09-19 PROCEDURE — 1036F TOBACCO NON-USER: CPT | Performed by: INTERNAL MEDICINE

## 2022-09-19 PROCEDURE — 1123F ACP DISCUSS/DSCN MKR DOCD: CPT | Performed by: INTERNAL MEDICINE

## 2022-09-19 PROCEDURE — G8399 PT W/DXA RESULTS DOCUMENT: HCPCS | Performed by: INTERNAL MEDICINE

## 2022-09-19 PROCEDURE — 99211 OFF/OP EST MAY X REQ PHY/QHP: CPT | Performed by: INTERNAL MEDICINE

## 2022-09-19 RX ORDER — HYDROCODONE BITARTRATE AND ACETAMINOPHEN 5; 325 MG/1; MG/1
1 TABLET ORAL EVERY 8 HOURS PRN
Qty: 90 TABLET | Refills: 0 | Status: SHIPPED | OUTPATIENT
Start: 2022-09-19 | End: 2022-10-19

## 2022-09-19 RX ORDER — HYDROCODONE BITARTRATE AND ACETAMINOPHEN 5; 325 MG/1; MG/1
1 TABLET ORAL EVERY 8 HOURS PRN
COMMUNITY
End: 2022-09-29 | Stop reason: SDUPTHER

## 2022-09-19 RX ORDER — GABAPENTIN 300 MG/1
300 CAPSULE ORAL 2 TIMES DAILY
Qty: 60 CAPSULE | Refills: 0 | Status: SHIPPED | OUTPATIENT
Start: 2022-09-19 | End: 2022-09-29 | Stop reason: ALTCHOICE

## 2022-09-19 NOTE — TELEPHONE ENCOUNTER
Jenifer Lewis MD VISIT   REFERRAL TO [facility] OFFICE  PRESCRIPTION FOR NEURONTIN CALLED TO [facility]   RV 10-31-22 1045AM   PORT DRAW 10-31-22 CMP, CDP

## 2022-09-19 NOTE — TELEPHONE ENCOUNTER
Oncology Navigation Note    Notes: Writer reviewed pt's chart for navigation update. Noted pt was seen by Dr Misbah Beavers today and a referral was placed to  [facility]. Writer spoke with Ana, , who confirmed she received the referral.      Will continue to follow.      Electronically signed by Walter Cristobal RN on 9/19/2022 at 2:08 PM

## 2022-09-22 ENCOUNTER — TELEPHONE (OUTPATIENT)
Dept: ONCOLOGY | Age: 72
End: 2022-09-22

## 2022-09-22 ENCOUNTER — HOSPITAL ENCOUNTER (OUTPATIENT)
Dept: RADIATION ONCOLOGY | Facility: MEDICAL CENTER | Age: 72
Discharge: HOME | End: 2022-09-22
Payer: MEDICARE

## 2022-09-22 VITALS
DIASTOLIC BLOOD PRESSURE: 80 MMHG | BODY MASS INDEX: 28.86 KG/M2 | HEART RATE: 98 BPM | OXYGEN SATURATION: 99 % | TEMPERATURE: 97 F | SYSTOLIC BLOOD PRESSURE: 128 MMHG | RESPIRATION RATE: 16 BRPM | WEIGHT: 157.8 LBS

## 2022-09-22 PROCEDURE — 99213 OFFICE O/P EST LOW 20 MIN: CPT | Performed by: RADIOLOGY

## 2022-09-22 PROCEDURE — 99205 OFFICE O/P NEW HI 60 MIN: CPT | Performed by: RADIOLOGY

## 2022-09-22 ASSESSMENT — PAIN SCALES - GENERAL: PAINLEVEL_OUTOF10: 4

## 2022-09-22 ASSESSMENT — PAIN DESCRIPTION - DESCRIPTORS: DESCRIPTORS: BURNING

## 2022-09-22 ASSESSMENT — PAIN DESCRIPTION - LOCATION: LOCATION: BACK;ABDOMEN
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
## 2022-09-22 NOTE — TELEPHONE ENCOUNTER
Oncology Navigation Note    Notes: Writer reviewed pt's chart for navigation update. Noted pt was seen in RO for consult. Plan is for pt to have return for teach/SIM on 9/28, pt will have 20 fractions. Will continue to follow.      Electronically signed by Carrie Farr RN on 9/22/2022 at 3:00 PM
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
## 2022-09-28 ENCOUNTER — HOSPITAL ENCOUNTER (OUTPATIENT)
Dept: RADIATION ONCOLOGY | Facility: MEDICAL CENTER | Age: 72
Discharge: HOME | End: 2022-09-28
Payer: MEDICARE

## 2022-09-28 PROCEDURE — 77334 RADIATION TREATMENT AID(S): CPT | Performed by: RADIOLOGY

## 2022-09-28 PROCEDURE — 77263 THER RADIOLOGY TX PLNG CPLX: CPT | Performed by: RADIOLOGY

## 2022-09-28 NOTE — DISCHARGE INSTRUCTIONS
way as it was simulated. Once the films and positioning are confirmed, a treatment will be delivered. Factors that affect the total length of the treatment include the complexity of your treatment and how quickly you can be positioned properly for treatment. Treatments are usually given once a day, Monday through Friday, for a number of weeks. The number of weeks is determined by national cancer guidelines and your physician. Each treatment generally takes only 10 to 15 minutes; however, you will likely be in the department for 20- 30 minutes each day. If your doctor has recommended SBRT treatments, your schedule will be different than mentioned above. Weekly Visits    During your treatments you will have a weekly appointment with your radiation oncologist. This appointment is to evaluate how you are tolerating your treatments and to address any questions/concerns you may have. Follow Up  We will follow your progress and see you on a regular basis. The duration between appointments vary depending on your tolerance to your treatments and your doctor's discretion. We understand that you may be seeing many other physicians, but it is important for us to participate in this follow-up process so that any radiation-related problems can be addressed if needed. Stomach and Abdomen Side Effects  Diarrhea  Fatigue  Hair loss  Nausea and vomiting  Skin changes  Urinary and bladder changes    Ways to Manage Diarrhea   When you have diarrhea:  Drink 8 to 12 cups of clear liquid per day. Severe diarrhea can cause your to become dehydrated, a problem that can become serious. This problem is caused by the loss of too much water from the body. Making sure you drink enough can help prevent it. If you drink liquids that are high in sugar (such as fruit juice, sweet iced tea, Arun-Aid, or Hi-C) ask your nurse or dietitian if you should mix them with extra water. Eat small meals and snacks.  Many people find that they eat better if they eat 5 to 6 small means and snacks each day, rather than 3 large meals  Eat foods that are high in salts such as sodium and potassium. Your body can lose these salts when you have diarrhea, and it is important to replace them. Foods that are high in sodium or potassium include bananas, oranges, peach and apricot nectar, and boiled or mashed potatoes. Eat low-fiber foods. Foods that are high in fiber can make diarrhea worse. Low-fiber foods include bananas, white rice, white toast, and plan or vanilla yogurt. Take care of your rectal area. Instead of toilet paper, use a baby wipe or squirt water from a spray bottle to clean yourself after bowel movements. Also, ask your nurse about taking sitz baths, which is a warm-water bath taken in a sitting position that covers only the hips and buttocks. Be sure to tell your doctor or nurse if you rectal area gets sore. Avoid:  Beer, wine, and other types of alcohol  Milk and dairy foods, such as ice cream, sour cream, and cheese  Spicy foods, such as hot sauce, salsa, chili, and cadena dishes  Foods or dinks with caffeine, such as regular coffee, black tea, soda and chocolate  Foods or drinks that cause gas, such as cooked dried beans, cabbage, broccoli, soy milk, and other soy products   Foods that are high in fiber, such as raw fruits and vegetables, cooked dried beans, and whole wheat breads and cereals  Fried or greasy foods  Food from [employer]  Talk with your doctor or nurse. Tell him or her if you are having diarrhea. He or she will suggest ways to manage it. He or she may also suggest taking medicine, such as imodium. Fatigue  How long it lasts  When you will first feel fatigue depends on a few factors, such as your age, health, how active you are, and how you felt before radiation therapy started. Fatigue can last from 6 weeks to a year after your last radiation therapy session.  Some people may always feel fatigue and not have as much energy as they did before radiation therapy. Ways to Manage Fatigue  Try to sleep at least 8 hours each night. This may be more sleep than you needed before radiation therapy. One way to sleep better at night is to be active during the day. Another way is to relax right before going to bed. Do calming activities before bedtime, such as reading, working on a jigsaw puzzle, or listening to music. Plan time to rest. Take short naps or rest breaks between activities. Try not to do too much. With fatigue, you ay not have enough energy to do all the things you want to do. Stay active, but choose the activities that are most important to you. Try to let go of things that don't matter as much now. For example, you might go to work but not do housework. You might watch your children's sprots events but not cook dinner. Exercise. Research shows that most people feel better when they get some exercise each day. Go for a short walk, ride a bike, or do yoga. Talk with your doctor or nurse about types of exercise you can do while having radiation therapy. Relax. Mediatation, prayer, gentle yoga, guided imagery, and visualization are ways you can learn to relax and decrease stress. For relaxation exercises:  Visit Learning to Relax on the [employer] [place]'s website at : www.cancer.gov/about-cancer/copingfeelings/relaxation  See Facing Forward: Life After Cancer Treatment at: www.cancer.gov/publications/patient-education/facing-forward  Eat and drink well. It can be easier to eat if you have 5 or 6 small meals each day, rather than 3 large ones. Keep foods around that are easy to fix, such as canned soups, frozen meals, yogurt, and cottage cheese. Drink plenty of fluids each day-about 8 cups of water or juice. Plan a work schedule that is right for you. Fatigue may affect the amount of energy you have for your job.  You may feel well enough to work your full schedule, or you may need to work less-maybe just a few hours a day or a few days each week. You may want to talk with your boss about ways to work from home so you do not need to commute. If possible, you may want to think about going on medical leave while you have radiation therapy. Ways to Manage Nausea and Vomiting    Plan when to eat and drink. Some people feel better when they eat before getting radiation therapy and others do not. Learn the best time for you to each and drink. Try a light snack, such as crackers and apple juice 1 to 2 hours before radiation therapy. Or, you might feel better if you have treatment on an empty stomach, which means not eating 2 or 3 hours before treatment. Eat small meals and snacks. Many people find that they eat better if they eat 5 or 6 small meals and snacks each day, rather than 3 large meals. Make sure to eat slowly and do not rush. Have foods and drinks that are at room temperature (not too hot and not too cold). Before eating or drinking, give hot food and drinks a chance to cool down. Warm cold food and drinks in the microwave for a short time. Talk with your doctor or nurse. He or she may suggest a special diet or prescribe medicine to help prevent nausea. You might also ask your doctor or nurse about acupuncture, which may help relieve nausea and vomiting caused by cancer treatment. Acupuncture is a type of complementary and alternative medicine. It is a technique that involves inserting thin needles through the skin at specific points on the body. Skin Changes  What they are:  Radiation therapy can cause skin changes in your treatment area. Here are some common skin changes:  Redness. Your skin in the treatment area may look as if you have a mild to severe sunburn or tan  Severe itching. The skin in your treatment area may itch very badly. It is important to avoid scratching, which can cause skin breakdown and infection.  Skin breakdown is a problem that happens when the skin in the treatment area peels off faster than it can grow back. Dry and peeling skin. The skin in your treatment area can get very dry. It may get so dry that it starts to peel, as if you have had a bad sunburn. If it peels off faster than it can grow back, you may develop sores or ulcers. Moist reaction. The skin in your treatment area can become wet, sore and infected This problem is more common where you have skin folds, such as your buttocks, behind your ears and under your breasts. It may also occur where your skin is very thin, such as your neck. Swollen skin. The skin in your treatment area may be swollen and puffy. Ways to Manage Skin Changes    Skin Care. Take extra good care of your skin during radiation therapy. Be gentle and do not rub, scrub, or scratch in the treatment area. Use creams that your doctor or nurse suggests. Acquaphor/ Eucerin/ Miaderm   Apply recommended lotion to treatment area 2 times daily. This should being when you start your treatments. Do not put anything on your skin that is very hot or cold. Do not use heating pads, ice packs or other hot or cold items on the treatment area  Be gentle when you shower or take a bath. You can take a lukewarm shower every day. If you prefer to take a lukewarm bath, so do only every other day and don't soak  For too long. Whether you take a shower or bath, make sure to use a mild soap. Dry yourself with a soft towel by patting, not rubbing, your skin. Be careful not to wash off the ink markings that you need for radiation therapy. Use only those lotions and skin products that your doctor or nurse suggests. If you are using a prescribed cream for a skin problem or acne, tell your doctor or nurse before you begin radiation treatment. Check with your doctor or nurse before using any of the following skin products: Bubble bath     Cornstarch  Cream  Deodorant  Hair removers  Makeup  Oil   Ointment  Perfume  Powder  Soap   Sunscreen  Cool, humid places.  Your skin may feel much better when you are in a cool, humid places. You can make rooms more humid by putting a bowl of water on the radiator or using a humidifier. If you use a humidifier, be sure to follow the directions about cleaning it to prevent bacteria. Soft fabrics. Wear clothes and use bed sheets that are made of very soft fabrics. Do not wear clothes in your treatment area that are tight and do not breathe, such as girdles, body shapers, and pantyhose  Protect your skin from the sun every day. The sun can burn you even on cloudy days or when you are outside for just a few minutes. Do not go to the beach or sunbathe. Wear a broad-brimmed hat, long-sleeved shirt, and long pants when you are outside. Talk with your doctor or nurse about sunscreen lotions. He or she may suggest that you use a sunscreen with an SPF of 30 or higher. You will need to protect your skin form the sun even after radiation therapy is over. Do not use tanning beds. Tanning beds expose you to the same harmful effects as the sun. Adhesive tape. Do not put adhesive bandages or other types of sticky tape on your skin in the treatment area. Talk with your doctor or nurse about ways to bandage without tape. Shaving. Ask your doctor or nurse if you can shave the treatment area. If you can shave, use an electric razor, but do not use a pre-shave liquid. Rectal area. If you have radiation therapy to the rectal area, you are likely to have skin problems. These problems are often worse after a bowel movement. Clean yourself with a baby wipe or squirt of water from a spray bottle. Ask your nurse if sitz baths might help you. Sitz baths are warm-water baths taken in a sitting position that covers only the hips and buttocks. Talk with your doctor or nurse. Some skin changes can be very serious. Your treatment team will check for skin changes each time you have radiation therapy. Make sure to report any skin changes that you notice. Medicine. Medicines can help with some skin changes. These include lotions for dry or itchy skin, antibiotics to treat infection, and drug to reduce swelling or itching. Urinary and Bladder Changes  What they are:Radiation therapy can cause urinary and bladder problems, which can include:  Burning or pain when you begin to urinate or after you urinate (empty your bladder)  Trouble starting to urinate  Trouble emptying your bladder completely  Frequent, urgent need to urinate  Cystitis, a swelling (inflammation) in your urinary tract  Incontinence, when you cannot control the flow of urine from your bladder, especially when coughing or sneezing  Waking frequently to urinate  Blood in your urine  Bladder spasms, which are like painful muscle cramps    Ways to Manage Urinary and Bladder Changes    Drink lots of fluids. Drink 6 to 8 cups of fluids each day, enough so that your urine is clear to light yellow in color  Avoid coffee, black tea, alcohol, spices, and all tobacco products  Talk with your doctor or nurse if you think you have urinary or bladder problems. You may need to provide a urine sample to check whether you have an infection  Talk with your doctor or nurse if you have incontinence. He or she may refer you to a physical therapist who will assess your problem. The therapist can give you exercises to improve bladder control. Medicine. Your doctor may prescribe antibiotics if your problems are caused by an infection.  Other medicines can help you urinate, reduce burning or pain, and ease bladder spasm  Lida Napoles

## 2022-09-29 ENCOUNTER — HOSPITAL ENCOUNTER (OUTPATIENT)
Dept: RADIATION ONCOLOGY | Facility: MEDICAL CENTER | Age: 72
Discharge: HOME | End: 2022-09-29
Payer: MEDICARE

## 2022-09-29 ENCOUNTER — OFFICE VISIT (OUTPATIENT)
Dept: FAMILY MEDICINE CLINIC | Age: 72
End: 2022-09-29
Payer: MEDICARE

## 2022-09-29 VITALS
WEIGHT: 157.4 LBS | DIASTOLIC BLOOD PRESSURE: 68 MMHG | OXYGEN SATURATION: 97 % | HEIGHT: 62 IN | SYSTOLIC BLOOD PRESSURE: 126 MMHG | HEART RATE: 97 BPM | TEMPERATURE: 97.6 F | BODY MASS INDEX: 28.97 KG/M2

## 2022-09-29 DIAGNOSIS — C83.33 DIFFUSE LARGE B-CELL LYMPHOMA OF INTRA-ABDOMINAL LYMPH NODES (HCC): Primary | ICD-10-CM

## 2022-09-29 DIAGNOSIS — E78.2 MIXED HYPERLIPIDEMIA: ICD-10-CM

## 2022-09-29 DIAGNOSIS — M81.0 AGE RELATED OSTEOPOROSIS, UNSPECIFIED PATHOLOGICAL FRACTURE PRESENCE: ICD-10-CM

## 2022-09-29 DIAGNOSIS — K80.20 CALCULUS OF GALLBLADDER WITHOUT CHOLECYSTITIS WITHOUT OBSTRUCTION: ICD-10-CM

## 2022-09-29 DIAGNOSIS — N18.1 STAGE 1 CHRONIC KIDNEY DISEASE: ICD-10-CM

## 2022-09-29 DIAGNOSIS — Z23 NEED FOR INFLUENZA VACCINATION: ICD-10-CM

## 2022-09-29 DIAGNOSIS — I72.8 SPLENIC ARTERY ANEURYSM (HCC): ICD-10-CM

## 2022-09-29 PROCEDURE — 77301 RADIOTHERAPY DOSE PLAN IMRT: CPT | Performed by: RADIOLOGY

## 2022-09-29 PROCEDURE — 90694 VACC AIIV4 NO PRSRV 0.5ML IM: CPT | Performed by: FAMILY MEDICINE

## 2022-09-29 PROCEDURE — G8417 CALC BMI ABV UP PARAM F/U: HCPCS | Performed by: FAMILY MEDICINE

## 2022-09-29 PROCEDURE — 1036F TOBACCO NON-USER: CPT | Performed by: FAMILY MEDICINE

## 2022-09-29 PROCEDURE — 77300 RADIATION THERAPY DOSE PLAN: CPT | Performed by: RADIOLOGY

## 2022-09-29 PROCEDURE — 3017F COLORECTAL CA SCREEN DOC REV: CPT | Performed by: FAMILY MEDICINE

## 2022-09-29 PROCEDURE — 1090F PRES/ABSN URINE INCON ASSESS: CPT | Performed by: FAMILY MEDICINE

## 2022-09-29 PROCEDURE — 99214 OFFICE O/P EST MOD 30 MIN: CPT | Performed by: FAMILY MEDICINE

## 2022-09-29 PROCEDURE — 77338 DESIGN MLC DEVICE FOR IMRT: CPT | Performed by: RADIOLOGY

## 2022-09-29 PROCEDURE — G8399 PT W/DXA RESULTS DOCUMENT: HCPCS | Performed by: FAMILY MEDICINE

## 2022-09-29 PROCEDURE — 1123F ACP DISCUSS/DSCN MKR DOCD: CPT | Performed by: FAMILY MEDICINE

## 2022-09-29 PROCEDURE — G8427 DOCREV CUR MEDS BY ELIG CLIN: HCPCS | Performed by: FAMILY MEDICINE

## 2022-09-29 PROCEDURE — G0008 ADMIN INFLUENZA VIRUS VAC: HCPCS | Performed by: FAMILY MEDICINE

## 2022-09-29 ASSESSMENT — ENCOUNTER SYMPTOMS
DIARRHEA: 0
RECTAL PAIN: 0
ABDOMINAL PAIN: 1
WHEEZING: 0
ABDOMINAL DISTENTION: 0
BACK PAIN: 1
NAUSEA: 0
COLOR CHANGE: 0
VOMITING: 0
CHEST TIGHTNESS: 0
TROUBLE SWALLOWING: 0
SINUS PRESSURE: 0
COUGH: 0
SORE THROAT: 0
BLOOD IN STOOL: 0
RHINORRHEA: 0
EYE REDNESS: 0
STRIDOR: 0
CONSTIPATION: 0
SHORTNESS OF BREATH: 0

## 2022-09-29 ASSESSMENT — PATIENT HEALTH QUESTIONNAIRE - PHQ9
SUM OF ALL RESPONSES TO PHQ QUESTIONS 1-9: 0
SUM OF ALL RESPONSES TO PHQ QUESTIONS 1-9: 0
2. FEELING DOWN, DEPRESSED OR HOPELESS: 0
1. LITTLE INTEREST OR PLEASURE IN DOING THINGS: 0
SUM OF ALL RESPONSES TO PHQ QUESTIONS 1-9: 0
SUM OF ALL RESPONSES TO PHQ QUESTIONS 1-9: 0
SUM OF ALL RESPONSES TO PHQ9 QUESTIONS 1 & 2: 0

## 2022-09-29 NOTE — PROGRESS NOTES
Chief Complaint   Patient presents with    Hyperlipidemia     NO CONCERNS          [place]  here today for follow up on chronic medical problems, go over labs and/or diagnostic studies, and medication refills. Hyperlipidemia (NO CONCERNS )      HPI: Patient is scheduled for follow-up on chronic medical conditions. Patient has history of large B-cell lymphoma, is on treatment. Patient has completed chemotherapy now is planning for radiotherapy. Patient has responded well. Patient has repeat PET scan done that showing improvement on lesions. Oncology note reviewed, PET scan results below. Reviewed results of CT PET which showed very good response to treatment. Patient has now completed cycle 6. Patient however continues to lose weight. We will continue to monitor weight loss. I will call in Neurontin for the patient for the neuropathic pain, postherpetic neuropathy. I will also call in Chaz Madhavi for the patient but encouraged the patient to only take Chaz Madhavi when necessary  Reviewed NCCN guidelines for treatment of diffuse large B-cell lymphoma. I believe patient has bulky disease. We will refer patient to radiation oncology for consolidation radiation therapy to the involved site. Patient is very happy to know the results of her CT PET. NCCN guidelines were reviewed and discussed with the patient. The diagnosis and care plan were discussed with the patient in detail. I discussed the natural history of the disease, prognosis, risks and goals of therapy and answered all the patients questions to the best of my ability. Patient expressed understanding and was in agreement. Hyperlipidemia uncontrolled is on statins LDL was 186. Patient has lost some weight on chemotherapy, now stable since last 2 months. Patient needs repeat lipid panel. Aspirin has been discontinued during chemotherapy.       The 10-year CVD risk score (D'Agostino, et al., 2008) is: 11.9%    Values used to calculate the score:      Age: 70 years      Sex: Female      Diabetic: No      Tobacco smoker: No      Systolic Blood Pressure: 885 mmHg      Is BP treated: No      HDL Cholesterol: 64 mg/dL      Total Cholesterol: 269 mg/dL    PET scan in September:    No metabolically active pulmonary nodules. No metabolically active   axillary, hilar, or mediastinal lymphadenopathy. ABDOMEN/PELVIS: Low level residual activity associated with residual   retroperitoneal/mesenteric mass, measuring approximately 2.4 x 2.1 cm with   SUV max of 2.4 (Lugano score 3). No new lymphadenopathy. BONES/SOFT TISSUE: No abnormal FDG activity localizes to the bones. No   aggressive osseous lesion. INCIDENTAL CT FINDINGS: Right-sided MediPort with the tip at the cavoatrial   junction. Atherosclerotic calcifications within the coronary arteries and   the aorta and its branches. Calcified granuloma. Cholelithiasis. CKD creatinine has increased during chemotherapy, repeat blood work is showing normal kidney function. Patient has osteoporosis on DEXA scan in 2019, was on Prolia and vitamin D supplements. Patient has started Prolia during treatment needs repeat DEXA scan. Splenic artery aneurysm which is stable. CT evaluation cholelithiasis, patient is asymptomatic for any abdominal pain nausea vomiting. We will continue to monitor. /68   Pulse 97   Temp 97.6 °F (36.4 °C)   Ht 5' 2\" (1.575 m)   Wt 157 lb 6.4 oz (71.4 kg)   SpO2 97%   BMI 28.79 kg/m²    Body mass index is 28.79 kg/m². Wt Readings from Last 3 Encounters:   09/29/22 157 lb 6.4 oz (71.4 kg)   09/22/22 157 lb 12.8 oz (71.6 kg)   09/19/22 156 lb 14.4 oz (71.2 kg)        [x]Negative depression screening.   PHQ Scores 9/29/2022 6/22/2022 4/27/2022 2/15/2022 12/23/2021 12/29/2020 7/20/2020   PHQ2 Score 0 0 0 0 0 0 0   PHQ9 Score 0 0 0 0 0 0 0      []1-4 = Minimal depression   []5-9 = Milddepression   []10-14 = Moderate depression   []15-19 = Moderately severe depression   []20-27 = Severe depression    Discussed testing with the patient and all questions fully answered. Hospital Outpatient Visit on 09/12/2022   Component Date Value Ref Range Status    POC Glucose 09/12/2022 135 (A)  65 - 105 mg/dL Final         Most recent labs reviewed:     Lab Results   Component Value Date    WBC 4.5 08/22/2022    HGB 12.3 08/22/2022    HCT 38.1 08/22/2022    .3 08/22/2022     08/22/2022       @BRIEFLAB(NA,K,CL,CO2,BUN,CREATININE,GLUCOSE,CALCIUM)@     Lab Results   Component Value Date    ALT 17 08/22/2022    AST 25 08/22/2022    ALKPHOS 66 08/22/2022    BILITOT 0.28 (L) 08/22/2022       Lab Results   Component Value Date    TSH 2.42 07/20/2020       Lab Results   Component Value Date    CHOL 194 07/20/2020    CHOL 270 (H) 01/22/2019     Lab Results   Component Value Date    TRIG 75 07/20/2020    TRIG 97 01/22/2019     Lab Results   Component Value Date    HDL 64 02/17/2022    HDL 64 07/20/2020    HDL 55 01/22/2019     Lab Results   Component Value Date    LDLCHOLESTEROL 186 (H) 02/17/2022    LDLCHOLESTEROL 115 07/20/2020    LDLCHOLESTEROL 196 (H) 01/22/2019     Lab Results   Component Value Date    VLDL NOT REPORTED 02/17/2022    VLDL NOT REPORTED 07/20/2020    VLDL NOT REPORTED 01/22/2019     Lab Results   Component Value Date    CHOLHDLRATIO 4.2 02/17/2022    CHOLHDLRATIO 3.0 07/20/2020    CHOLHDLRATIO 4.9 01/22/2019       No results found for: LABA1C    No results found for: ZRGIEEMV27    No results found for: FOLATE    No results found for: IRON, TIBC, FERRITIN    No results found for: VITD25          Current Outpatient Medications   Medication Sig Dispense Refill    HYDROcodone-acetaminophen (NORCO) 5-325 MG per tablet Take 1 tablet by mouth every 8 hours as needed for Pain for up to 30 days.  90 tablet 0    ondansetron (ZOFRAN ODT) 8 MG TBDP disintegrating tablet Take 1 tablet by mouth every 8 hours as needed for Nausea or Vomiting 30 tablet 3 omeprazole (PRILOSEC) 20 MG delayed release capsule take 1 capsule by mouth twice a day before meals 60 capsule 2    atorvastatin (LIPITOR) 20 MG tablet Take 1 tablet by mouth daily take 1 tablet by mouth once daily 90 tablet 3    vitamin D (CHOLECALCIFEROL) 1000 UNIT TABS tablet Take 1 tablet by mouth daily VITAMIN D3. OK to substitute 90 tablet 3    Multiple Vitamin (MULTI VITAMIN PO) Take by mouth       No current facility-administered medications for this visit. Social History     Socioeconomic History    Marital status: Single     Spouse name: Not on file    Number of children: 3    Years of education: 12    Highest education level: Associate degree: occupational, technical, or vocational program   Occupational History     Employer: UNEMPLOYED   Tobacco Use    Smoking status: Former     Packs/day: 0.25     Years: 50.00     Pack years: 12.50     Types: Cigarettes     Start date: 3/1/1966     Quit date: 2015     Years since quittin.7    Smokeless tobacco: Never    Tobacco comments:     quit 2015 ; age start    Substance and Sexual Activity    Alcohol use: No    Drug use: No    Sexual activity: Not Currently   Other Topics Concern    Not on file   Social History Narrative    Not on file     Social Determinants of Health     Financial Resource Strain: Low Risk     Difficulty of Paying Living Expenses: Not hard at all   Food Insecurity: No Food Insecurity    Worried About Running Out of Food in the Last Year: Never true    [address] in the Last Year: Never true   Transportation Needs: No Transportation Needs    Lack of Transportation (Medical): No    Lack of Transportation (Non-Medical):  No   Physical Activity: Unknown    Days of Exercise per Week: 0 days    Minutes of Exercise per Session: Not on file   Stress: Not on file   Social Connections: Not on file   Intimate Partner Violence: Not on file   Housing Stability: Unknown    Unable to Pay for Housing in the Last Year: No    Number of Places Lived in the Last Year: Not on file    Unstable Housing in the Last Year: No     Counseling given: Not Answered  Tobacco comments: quit 2015 ; age start         Family History   Problem Relation Age of Onset    Heart Attack Mother     Heart Disease Mother     Coronary Art Dis Mother          from complication [facility] age 67    COPD Father         death age 66 pulm disease     High Blood Pressure Sister     Mult Sclerosis Sister     Rheum Arthritis Sister     Cancer Sister 27        Geoblastoma    Diabetes Brother     Cancer Sister 27        leukemia    Lung Cancer Brother     Alcohol Abuse Brother              -rest of complaints with corresponding details per ROS    The patient's past medical, surgical, social, and family history as well as her current medications and allergies were reviewed as documented intoday's encounter. Review of Systems   Constitutional:  Positive for activity change and unexpected weight change. Negative for appetite change, fatigue and fever. HENT:  Negative for congestion, ear pain, postnasal drip, rhinorrhea, sinus pressure, sore throat and trouble swallowing. Eyes:  Negative for redness and visual disturbance. Respiratory:  Negative for cough, chest tightness, shortness of breath, wheezing and stridor. Cardiovascular:  Negative for chest pain, palpitations and leg swelling. Gastrointestinal:  Positive for abdominal pain. Negative for abdominal distention, blood in stool, constipation, diarrhea, nausea, rectal pain and vomiting. Endocrine: Negative for polyphagia and polyuria. Genitourinary:  Negative for difficulty urinating, flank pain, frequency and urgency. Musculoskeletal:  Positive for arthralgias and back pain. Negative for gait problem, myalgias and neck pain. Skin:  Negative for color change, rash and wound. Allergic/Immunologic: Positive for immunocompromised state. Negative for food allergies. Neurological:  Positive for numbness.  Negative for dizziness, speech difficulty, weakness, light-headedness and headaches. Psychiatric/Behavioral:  Negative for agitation, behavioral problems, decreased concentration, dysphoric mood, hallucinations, sleep disturbance and suicidal ideas. The patient is nervous/anxious. Physical Exam  Vitals and nursing note reviewed. Constitutional:       General: She is not in acute distress. Appearance: Normal appearance. She is well-developed. She is not diaphoretic. HENT:      Head: Normocephalic and atraumatic. Nose: Nose normal.   Eyes:      General:         Right eye: No discharge. Left eye: No discharge. Extraocular Movements: Extraocular movements intact. Conjunctiva/sclera: Conjunctivae normal.      Pupils: Pupils are equal, round, and reactive to light. Neck:      Thyroid: No thyromegaly. Cardiovascular:      Rate and Rhythm: Normal rate and regular rhythm. Heart sounds: Normal heart sounds. No murmur heard. Pulmonary:      Effort: Pulmonary effort is normal. No respiratory distress. Breath sounds: Normal breath sounds. No wheezing or rhonchi. Abdominal:      General: Bowel sounds are normal. There is no distension. Palpations: Abdomen is soft. There is no mass. Tenderness: There is no abdominal tenderness. There is no guarding or rebound. Musculoskeletal:         General: No tenderness. Cervical back: Normal range of motion and neck supple. Spasms present. No rigidity. Thoracic back: No spasms. Normal range of motion. Lumbar back: Spasms present. Decreased range of motion. Right lower leg: No deformity. No edema. Left lower leg: No deformity. No edema. Lymphadenopathy:      Cervical: No cervical adenopathy. Skin:     Coloration: Skin is not jaundiced or pale. Findings: No bruising, erythema or rash. Neurological:      General: No focal deficit present.       Mental Status: She is alert and oriented to person, DUNIA, (age 72 y+), IM, Preservative Free, 0.5 mL    Lipid Panel     Standing Status:   Future     Standing Expiration Date:   9/29/2023     Order Specific Question:   Is Patient Fasting?/# of Hours     Answer:   yes, 8-10 hours         Medications Discontinued During This Encounter   Medication Reason    gabapentin (NEURONTIN) 300 MG capsule Therapy completed    HYDROcodone-acetaminophen ([address]) 5-325 MG per tablet ROSALES       Nallely received counseling on the following healthy behaviors: nutrition, exercise, and medication adherence  Reviewed prior labs and health maintenance  Continue current medications, diet and exercise. Discussed use, benefit, and side effects of prescribed medications. Barriers to medication compliance addressed. Patient given educational materials - see patient instructions  Was a self-tracking handout given in paper form or via [employer]? Yes    Requested Prescriptions      No prescriptions requested or ordered in this encounter       All patient questions answered. Patient voiced understanding. Quality Measures    Body mass index is 28.79 kg/m². Elevated. Weight control planned discussed daily exercise regimen and Healthy diet and regular exercise. BP: 126/68. Blood pressure is Normal. Treatment plan consists of DASH Eating Plan, Dietary Sodium Restriction, Increased Physical Activity, and No treatment change needed. Fall Risk 9/29/2022 6/22/2022 12/23/2021 12/29/2020 10/22/2020 9/24/2019 1/16/2019   2 or more falls in past year? no no no no no no no   Fall with injury in past year? no no no no no no yes     The patient does not have a history of falls. I did , complete a risk assessment for falls.  A plan of care for falls in-office gait and balance testing performed using The Timed Up and Go Test was negative for increased falls risk- no further intervention is currently indicated, home safety tips provided, No Treatment plan indicated    Lab Results   Component Value

## 2022-09-29 NOTE — PATIENT INSTRUCTIONS
New Updates for [facility] MyChart/ [employer] ([facility]) TSERING    Thank you for choosing US to give you the best care! [employer] ([facility]) is always trying to think of new ways to help their patients. We are asking all patients to try out the new digital registration that is now available through your [facility] account or the new TSERING, [employer] ([facility]). Via the tsering you're now able to update your personal and registration information prior to your upcoming appointment. This will save you time once you arrive at the office to check-in, not to mention your information remains safe!! Many other perks come from signing up for an account, such as:  Requesting refills  Scheduling an appointment  Completing an E-Visit  Sending a message to the office/provider  Having access to your medication list  Paying your bill/copay prior to your appointment  Scheduling your yearly mammogram  Review your test results    If you are not familiar with [facility] or the [employer] ([facility]) TSERING, please ask one of us and we will be happy to answer any questions or help you set-up your account.       Your [facility] office,  [place]

## 2022-09-29 NOTE — PROGRESS NOTES
Visit Information    Have you changed or started any medications since your last visit including any over-the-counter medicines, vitamins, or herbal medicines? no   Have you stopped taking any of your medications? Is so, why? -  no  Are you having any side effects from any of your medications? - no    Have you seen any other physician or provider since your last visit? yes -    Have you had any other diagnostic tests since your last visit? yes -    Have you been seen in the emergency room and/or had an admission in a hospital since we last saw you?  no   Have you had your routine dental cleaning in the past 6 months?  yes -      Do you have an active MyChart account? If no, what is the barrier?   Yes    Patient Care Team:  Andrew Vogt MD as PCP - General (Family Medicine)  Andrew Vogt MD as PCP - [facility]  Javier Rosa RN as Nurse Navigator (Oncology)  Pawel Hernandez MD as Consulting Physician (Radiation Oncology)    Medical History Review  Past Medical, Family, and Social History reviewed and does contribute to the patient presenting condition    Health Maintenance   Topic Date Due    Shingles vaccine (1 of 2) Never done    COVID-19 Vaccine (4 - Booster for [employer] series) 03/02/2022    Flu vaccine (1) 08/01/2022    Lipids  02/17/2023    Depression Screen  06/22/2023    Annual Wellness Visit (AWV)  06/30/2023    Colorectal Cancer Screen  10/28/2023    Breast cancer screen  02/21/2024    DTaP/Tdap/Td vaccine (3 - Td or Tdap) 02/08/2030    DEXA (modify frequency per FRAX score)  Completed    Pneumococcal 65+ years Vaccine  Completed    Hepatitis C screen  Completed    Hepatitis A vaccine  Aged Out    Hepatitis B vaccine  Aged Out    Hib vaccine  Aged Out    Meningococcal (ACWY) vaccine  Aged Out

## 2022-10-03 ENCOUNTER — HOSPITAL ENCOUNTER (OUTPATIENT)
Dept: RADIATION ONCOLOGY | Facility: MEDICAL CENTER | Age: 72
Discharge: HOME | End: 2022-10-03
Payer: MEDICARE

## 2022-10-03 PROCEDURE — 77386 HC NTSTY MODUL RAD TX DLVR CPLX: CPT | Performed by: RADIOLOGY

## 2022-10-03 PROCEDURE — 77014 PR CT GUIDANCE PLACEMENT RAD THERAPY FIELDS: CPT | Performed by: RADIOLOGY

## 2022-10-04 ENCOUNTER — HOSPITAL ENCOUNTER (OUTPATIENT)
Dept: RADIATION ONCOLOGY | Facility: MEDICAL CENTER | Age: 72
Discharge: HOME | End: 2022-10-04
Payer: MEDICARE

## 2022-10-04 ENCOUNTER — TELEPHONE (OUTPATIENT)
Dept: ONCOLOGY | Age: 72
End: 2022-10-04

## 2022-10-04 PROCEDURE — 77014 PR CT GUIDANCE PLACEMENT RAD THERAPY FIELDS: CPT | Performed by: RADIOLOGY

## 2022-10-04 PROCEDURE — 77386 HC NTSTY MODUL RAD TX DLVR CPLX: CPT | Performed by: RADIOLOGY

## 2022-10-04 NOTE — TELEPHONE ENCOUNTER
Oncology Navigation Note    Notes: Writer reviewed pt's chart for navigation update. Noted pt started RT yesterday. Will continue to follow.      Electronically signed by Jeet Mccartney RN on 10/4/2022 at 9:37 AM

## 2022-10-05 ENCOUNTER — HOSPITAL ENCOUNTER (OUTPATIENT)
Dept: RADIATION ONCOLOGY | Facility: MEDICAL CENTER | Age: 72
Discharge: HOME | End: 2022-10-05
Payer: MEDICARE

## 2022-10-05 PROCEDURE — 77014 PR CT GUIDANCE PLACEMENT RAD THERAPY FIELDS: CPT | Performed by: RADIOLOGY

## 2022-10-05 PROCEDURE — 77336 RADIATION PHYSICS CONSULT: CPT | Performed by: RADIOLOGY

## 2022-10-05 PROCEDURE — 77386 HC NTSTY MODUL RAD TX DLVR CPLX: CPT | Performed by: RADIOLOGY

## 2022-10-06 ENCOUNTER — TELEPHONE (OUTPATIENT)
Dept: ONCOLOGY | Age: 72
End: 2022-10-06

## 2022-10-06 ENCOUNTER — HOSPITAL ENCOUNTER (OUTPATIENT)
Dept: RADIATION ONCOLOGY | Facility: MEDICAL CENTER | Age: 72
Discharge: HOME | End: 2022-10-06
Payer: MEDICARE

## 2022-10-06 PROCEDURE — 77014 PR CT GUIDANCE PLACEMENT RAD THERAPY FIELDS: CPT | Performed by: RADIOLOGY

## 2022-10-06 PROCEDURE — 77386 HC NTSTY MODUL RAD TX DLVR CPLX: CPT | Performed by: RADIOLOGY

## 2022-10-06 NOTE — TELEPHONE ENCOUNTER
[facility] Nutrition Note    PG-SGA screening tool reviewed with score of 11. Pt reports unintentional weight loss, nausea, altered taste acuity, dry mouth, early satiety, consuming less than normal amounts of normal food, and not feeling up to most things, but in bed/chair less than half the day. Full nutrition assessment for scores > 4. Will follow.     Modesta Landers, MS, RD, LD  Registered Dietitian  [facility]  [phone]

## 2022-10-07 ENCOUNTER — HOSPITAL ENCOUNTER (OUTPATIENT)
Dept: RADIATION ONCOLOGY | Facility: MEDICAL CENTER | Age: 72
Discharge: HOME | End: 2022-10-07
Payer: MEDICARE

## 2022-10-07 PROCEDURE — 77014 PR CT GUIDANCE PLACEMENT RAD THERAPY FIELDS: CPT | Performed by: RADIOLOGY

## 2022-10-07 PROCEDURE — 77386 HC NTSTY MODUL RAD TX DLVR CPLX: CPT | Performed by: RADIOLOGY

## 2022-10-10 ENCOUNTER — HOSPITAL ENCOUNTER (OUTPATIENT)
Dept: RADIATION ONCOLOGY | Facility: MEDICAL CENTER | Age: 72
End: 2022-10-10
Payer: MEDICARE

## 2022-10-11 ENCOUNTER — HOSPITAL ENCOUNTER (OUTPATIENT)
Dept: RADIATION ONCOLOGY | Facility: MEDICAL CENTER | Age: 72
Discharge: HOME | End: 2022-10-11
Payer: MEDICARE

## 2022-10-11 VITALS
OXYGEN SATURATION: 99 % | RESPIRATION RATE: 16 BRPM | BODY MASS INDEX: 28.68 KG/M2 | SYSTOLIC BLOOD PRESSURE: 118 MMHG | TEMPERATURE: 97.8 F | DIASTOLIC BLOOD PRESSURE: 76 MMHG | WEIGHT: 156.8 LBS | HEART RATE: 100 BPM

## 2022-10-11 PROCEDURE — 77427 RADIATION TX MANAGEMENT X5: CPT | Performed by: RADIOLOGY

## 2022-10-11 PROCEDURE — 77386 HC NTSTY MODUL RAD TX DLVR CPLX: CPT | Performed by: RADIOLOGY

## 2022-10-11 PROCEDURE — 77014 PR CT GUIDANCE PLACEMENT RAD THERAPY FIELDS: CPT | Performed by: RADIOLOGY

## 2022-10-11 NOTE — PROGRESS NOTES
Nallely Loeramm  10/11/2022  Wt Readings from Last 3 Encounters:   10/11/22 156 lb 12.8 oz (71.1 kg)   09/29/22 157 lb 6.4 oz (71.4 kg)   09/22/22 157 lb 12.8 oz (71.6 kg)     Body mass index is 28.68 kg/m². Treatment Area:abdominal nodes        Comfort Alteration  Fatigue: None      Nutritional Alteration  Anorexia: No   Nausea: No   Vomiting: No       Elimination Alterations  Constipation: no  Diarrhea:  no    Skin Alteration   Sensation:intact    Radiation Dermatitis:  Intact [x]     Erythema  []     Discoloration  []     Rash []     Dry desquamation  []     Moist desquamation []       Emotional  Coping: effective      Injury, potential bleeding or infection: none    Lab Results   Component Value Date    WBC 4.5 08/22/2022     08/22/2022         /76   Pulse 100   Temp 97.8 °F (36.6 °C) (Temporal)   Resp 16   Wt 156 lb 12.8 oz (71.1 kg)   SpO2 99%   BMI 28.68 kg/m²      Pain Assessment: None - Denies Pain              Assessment/Plan: Patient was seen today for weekly visit. Tolerating treatment without any complaints. Dr. Cassie Demarco notified of assessment and examined patient. Continue current treatment plan.     Carolyn Henriquez RN

## 2022-10-12 ENCOUNTER — HOSPITAL ENCOUNTER (OUTPATIENT)
Dept: RADIATION ONCOLOGY | Facility: MEDICAL CENTER | Age: 72
Discharge: HOME | End: 2022-10-12
Payer: MEDICARE

## 2022-10-12 PROCEDURE — 77386 HC NTSTY MODUL RAD TX DLVR CPLX: CPT | Performed by: RADIOLOGY

## 2022-10-12 PROCEDURE — 77336 RADIATION PHYSICS CONSULT: CPT | Performed by: RADIOLOGY

## 2022-10-12 PROCEDURE — 77014 PR CT GUIDANCE PLACEMENT RAD THERAPY FIELDS: CPT | Performed by: RADIOLOGY

## 2022-10-13 ENCOUNTER — HOSPITAL ENCOUNTER (OUTPATIENT)
Dept: RADIATION ONCOLOGY | Facility: MEDICAL CENTER | Age: 72
Discharge: HOME | End: 2022-10-13
Payer: MEDICARE

## 2022-10-13 ENCOUNTER — TELEPHONE (OUTPATIENT)
Dept: ONCOLOGY | Age: 72
End: 2022-10-13

## 2022-10-13 PROCEDURE — 77386 HC NTSTY MODUL RAD TX DLVR CPLX: CPT | Performed by: RADIOLOGY

## 2022-10-13 PROCEDURE — 77014 PR CT GUIDANCE PLACEMENT RAD THERAPY FIELDS: CPT | Performed by: RADIOLOGY

## 2022-10-13 NOTE — TELEPHONE ENCOUNTER
[facility] Radiation Oncology Nutrition Note:    Call placed to number on chart regarding PG-SGA scores. Call went straight to voicemail. Left detailed message for call back.     Josef Todd, MS, RD, LD  Registered Dietitian  [facility]  [phone]

## 2022-10-14 ENCOUNTER — HOSPITAL ENCOUNTER (OUTPATIENT)
Dept: RADIATION ONCOLOGY | Facility: MEDICAL CENTER | Age: 72
Discharge: HOME | End: 2022-10-14
Payer: MEDICARE

## 2022-10-14 PROCEDURE — 77014 PR CT GUIDANCE PLACEMENT RAD THERAPY FIELDS: CPT | Performed by: RADIOLOGY

## 2022-10-14 PROCEDURE — 77386 HC NTSTY MODUL RAD TX DLVR CPLX: CPT | Performed by: RADIOLOGY

## 2022-10-14 NOTE — PROGRESS NOTES
[facility]       Radiation Oncology          [address]          [facility], [facility] [address]        Liz Spearfish: [phone]        F: [phone]       [employer] [address] [place] [address]       Radiation Oncology   [address] [facility], [address] [facility]       Liz Karina: [phone]       F: [phone]       [email or web address]          RADIATION ONCOLOGY WEEKLY PROGRESS NOTE  Patient ID:   Shayla Gómez  : 1950   MRN: [id number]    Location:  [facility] Radiation Oncology [address] [place] [address] [address], [address] [facility]  [phone]     DIAGNOSIS: Non-Hodgkin's Lymphoma  Cancer Staging  No matching staging information was found for the patient. TREATMENT DETAILS:  Treatment Site: Para-aortic LNs  Actual Dose: 180 cGy  Total Planned Dose: 1080 cGy  Treatment Technique: 3D-CRT  Fraction Technique: Daily  Therapy imaging monitoring: KV match daily with MV ports  Concurrent Chemotherapy: no    SUBJECTIVE:   Patient seen for their weekly on treatment evaluation today. So far, she feels like she is tolerating treatment well, and her energy is holding up.     OBJECTIVE:   CHAPERONE: Nurse/MA Present    ECO Asymptomatic    VITAL SIGNS: /76   Pulse 100   Temp 97.8 °F (36.6 °C) (Temporal)   Resp 16   Wt 156 lb 12.8 oz (71.1 kg)   SpO2 99%   BMI 28.68 kg/m²   Wt Readings from Last 5 Encounters:   10/11/22 156 lb 12.8 oz (71.1 kg)   22 157 lb 6.4 oz (71.4 kg)   22 157 lb 12.8 oz (71.6 kg)   22 156 lb 14.4 oz (71.2 kg)   22 162 lb 4.8 oz (73.6 kg)     @PE@    LABS:  WBC   Date Value Ref Range Status   2022 4.5 3.5 - 11.3 k/uL Final   2022 6.1 3.5 - 11.3 k/uL Final   2022 5.2 3.5 - 11.3 k/uL Final     Segs Absolute   Date Value Ref Range Status   2022 1.87 1.50 - 8.10 k/uL Final   2022 3.64 1.50 - 8.10 k/uL Final   2022 3.19 1.50 - 8.10 k/uL Final     Hemoglobin Date Value Ref Range Status   08/22/2022 12.3 11.9 - 15.1 g/dL Final   08/08/2022 11.4 (L) 11.9 - 15.1 g/dL Final   07/18/2022 11.5 (L) 11.9 - 15.1 g/dL Final     Platelets   Date Value Ref Range Status   08/22/2022 189 138 - 453 k/uL Final   08/08/2022 406 138 - 453 k/uL Final   07/18/2022 255 138 - 453 k/uL Final     Creatinine   Date Value Ref Range Status   08/22/2022 0.60 0.50 - 0.90 mg/dL Final   08/08/2022 0.63 0.50 - 0.90 mg/dL Final   07/18/2022 0.61 0.50 - 0.90 mg/dL Final     No results found for: , CEA  No results found for: PSA    MEDICATIONS:    Current Outpatient Medications:     HYDROcodone-acetaminophen (NORCO) 5-325 MG per tablet, Take 1 tablet by mouth every 8 hours as needed for Pain for up to 30 days. , Disp: 90 tablet, Rfl: 0    ondansetron (ZOFRAN ODT) 8 MG TBDP disintegrating tablet, Take 1 tablet by mouth every 8 hours as needed for Nausea or Vomiting, Disp: 30 tablet, Rfl: 3    omeprazole (PRILOSEC) 20 MG delayed release capsule, take 1 capsule by mouth twice a day before meals, Disp: 60 capsule, Rfl: 2    atorvastatin (LIPITOR) 20 MG tablet, Take 1 tablet by mouth daily take 1 tablet by mouth once daily, Disp: 90 tablet, Rfl: 3    vitamin D (CHOLECALCIFEROL) 1000 UNIT TABS tablet, Take 1 tablet by mouth daily VITAMIN D3. OK to substitute, Disp: 90 tablet, Rfl: 3    Multiple Vitamin (MULTI VITAMIN PO), Take by mouth, Disp: , Rfl:       ASSESSMENT PLAN:   Treatment setup and plan reviewed. Port images/CBCT images reviewed. Appropriate laboratory work was reviewed. Treatment side effects and toxicities reviewed with the patient, and appropriate management was advised. Will continue radiation treatment as planned, and recommend patient contact us if they have any questions or concerns. The plan is to continue radiation.     Electronically signed by Sonia Guidry MD on 10/14/2022 at 9:34 AM      Drugs Prescribed:  New Prescriptions    No medications on file       Other Orders Placed:  No orders of the defined types were placed in this encounter. [facility] [address]       Radiation Oncology          [address] [facility], [address]        Nehemias Patel: [phone]        F: [phone]       [employer] [address] [facility]       Radiation Oncology   [address] [facility], [address] [facility]       Nehemias Patel: [phone]       F: [phone]       [email or web address]          RADIATION ONCOLOGY WEEKLY PROGRESS NOTE  Patient ID:   Poncho Delgado McKimm  : 1950   MRN: [id number]    Location:  [place]    DIAGNOSIS: Lymphoma  Cancer Staging  No matching staging information was found for the patient. TREATMENT DETAILS:  Treatment Site: Para-aortic LNs  Actual Dose: 1080 cGy  Total Planned Dose:  3000 cGy  Treatment Technique: 3D-CRT  Fraction Technique: Daily  Therapy imaging monitoring: KV match daily with MV ports  Concurrent Chemotherapy: NO    SUBJECTIVE:   Patient seen for their weekly on treatment evaluation today.  She is tolerating Treatment well and maintaining her energy    OBJECTIVE:   CHAPERONE: Nurse/MA Present    ECO Asymptomatic    VITAL SIGNS: /76   Pulse 100   Temp 97.8 °F (36.6 °C) (Temporal)   Resp 16   Wt 156 lb 12.8 oz (71.1 kg)   SpO2 99%   BMI 28.68 kg/m²   Wt Readings from Last 5 Encounters:   10/11/22 156 lb 12.8 oz (71.1 kg)   22 157 lb 6.4 oz (71.4 kg)   22 157 lb 12.8 oz (71.6 kg)   22 156 lb 14.4 oz (71.2 kg)   22 162 lb 4.8 oz (73.6 kg)     @[facility]@    LABS:  WBC   Date Value Ref Range Status   2022 4.5 3.5 - 11.3 k/uL Final   2022 6.1 3.5 - 11.3 k/uL Final   2022 5.2 3.5 - 11.3 k/uL Final     Segs Absolute   Date Value Ref Range Status   2022 1.87 1.50 - 8.10 k/uL Final   2022 3.64 1.50 - 8.10 k/uL Final   2022 3.19 1.50 - 8.10 k/uL Final     Hemoglobin   Date Value Ref Range Status   2022 12.3 11.9 - 15.1 g/dL Final 08/08/2022 11.4 (L) 11.9 - 15.1 g/dL Final   07/18/2022 11.5 (L) 11.9 - 15.1 g/dL Final     Platelets   Date Value Ref Range Status   08/22/2022 189 138 - 453 k/uL Final   08/08/2022 406 138 - 453 k/uL Final   07/18/2022 255 138 - 453 k/uL Final     Creatinine   Date Value Ref Range Status   08/22/2022 0.60 0.50 - 0.90 mg/dL Final   08/08/2022 0.63 0.50 - 0.90 mg/dL Final   07/18/2022 0.61 0.50 - 0.90 mg/dL Final     No results found for: , CEA  No results found for: PSA    MEDICATIONS:    Current Outpatient Medications:     HYDROcodone-acetaminophen (NORCO) 5-325 MG per tablet, Take 1 tablet by mouth every 8 hours as needed for Pain for up to 30 days. , Disp: 90 tablet, Rfl: 0    ondansetron (ZOFRAN ODT) 8 MG TBDP disintegrating tablet, Take 1 tablet by mouth every 8 hours as needed for Nausea or Vomiting, Disp: 30 tablet, Rfl: 3    omeprazole (PRILOSEC) 20 MG delayed release capsule, take 1 capsule by mouth twice a day before meals, Disp: 60 capsule, Rfl: 2    atorvastatin (LIPITOR) 20 MG tablet, Take 1 tablet by mouth daily take 1 tablet by mouth once daily, Disp: 90 tablet, Rfl: 3    vitamin D (CHOLECALCIFEROL) 1000 UNIT TABS tablet, Take 1 tablet by mouth daily VITAMIN D3. OK to substitute, Disp: 90 tablet, Rfl: 3    Multiple Vitamin (MULTI VITAMIN PO), Take by mouth, Disp: , Rfl:       ASSESSMENT PLAN:   Treatment setup and plan reviewed. Port images/CBCT images reviewed. Appropriate laboratory work was reviewed. Treatment side effects and toxicities reviewed with the patient, and appropriate management was advised. Will continue radiation treatment as planned, and recommend patient contact us if they have any questions or concerns. We will continue treatment. Electronically signed by Zari Salinas MD on 10/14/2022 at 9:34 AM      Drugs Prescribed:  New Prescriptions    No medications on file       Other Orders Placed:  No orders of the defined types were placed in this encounter.

## 2022-10-17 ENCOUNTER — HOSPITAL ENCOUNTER (OUTPATIENT)
Dept: RADIATION ONCOLOGY | Facility: MEDICAL CENTER | Age: 72
Discharge: HOME | End: 2022-10-17
Payer: MEDICARE

## 2022-10-17 PROCEDURE — 77386 HC NTSTY MODUL RAD TX DLVR CPLX: CPT | Performed by: RADIOLOGY

## 2022-10-17 PROCEDURE — 77014 PR CT GUIDANCE PLACEMENT RAD THERAPY FIELDS: CPT | Performed by: RADIOLOGY

## 2022-10-18 ENCOUNTER — HOSPITAL ENCOUNTER (OUTPATIENT)
Dept: RADIATION ONCOLOGY | Facility: MEDICAL CENTER | Age: 72
Discharge: HOME | End: 2022-10-18
Payer: MEDICARE

## 2022-10-18 VITALS
DIASTOLIC BLOOD PRESSURE: 66 MMHG | RESPIRATION RATE: 16 BRPM | OXYGEN SATURATION: 97 % | WEIGHT: 157.2 LBS | HEART RATE: 94 BPM | SYSTOLIC BLOOD PRESSURE: 103 MMHG | BODY MASS INDEX: 28.75 KG/M2 | TEMPERATURE: 97 F

## 2022-10-18 PROCEDURE — 77014 PR CT GUIDANCE PLACEMENT RAD THERAPY FIELDS: CPT | Performed by: RADIOLOGY

## 2022-10-18 PROCEDURE — 77386 HC NTSTY MODUL RAD TX DLVR CPLX: CPT | Performed by: RADIOLOGY

## 2022-10-18 PROCEDURE — 77427 RADIATION TX MANAGEMENT X5: CPT | Performed by: RADIOLOGY

## 2022-10-18 RX ORDER — GABAPENTIN 300 MG/1
CAPSULE ORAL
Qty: 60 CAPSULE | Refills: 2 | Status: SHIPPED | OUTPATIENT
Start: 2022-10-18 | End: 2022-11-17

## 2022-10-18 NOTE — PROGRESS NOTES
Nallely Mcdermott  10/18/2022  Wt Readings from Last 3 Encounters:   10/18/22 157 lb 3.2 oz (71.3 kg)   10/11/22 156 lb 12.8 oz (71.1 kg)   09/29/22 157 lb 6.4 oz (71.4 kg)     Body mass index is 28.75 kg/m². Treatment Area:abdominal lymph nodes, treatment 11 of 20        Comfort Alteration  Fatigue: Mild      Nutritional Alteration  Anorexia: No   Nausea: No   Vomiting: No       Elimination Alterations  Constipation: no  Diarrhea:  no    Skin Alteration   Sensation:intact    Radiation Dermatitis:  Intact [x]     Erythema  []     Discoloration  []     Rash []     Dry desquamation  []     Moist desquamation []       Emotional  Coping: effective      Injury, potential bleeding or infection: none    Lab Results   Component Value Date    WBC 4.5 08/22/2022     08/22/2022         /66   Pulse 94   Temp 97 °F (36.1 °C) (Temporal)   Resp 16   Wt 157 lb 3.2 oz (71.3 kg)   SpO2 97%   BMI 28.75 kg/m²      Pain Assessment: None - Denies Pain              Assessment/Plan: Patient was seen today for weekly visit. Denies any treatment related complaints. Dr. Sascha Drake notified of assessment and examined patient. No change in treatment plan.     Teagan Magallanes RN

## 2022-10-18 NOTE — PROGRESS NOTES
[facility] [address]       Radiation Oncology          [address] [facility], [address]        Bonny Pares: [phone]        F: [phone]       [facility] [address]       Radiation Oncology   [address] [facility], [address] [facility]       Bonny Pares: [phone]       F: [phone]       [employer]          RADIATION ONCOLOGY WEEKLY PROGRESS NOTE  Patient ID:   Nj Braxton  : 1950   MRN: [id number]    Location:  [address] [place] [address], [address], [facility], [address] [facility]  [phone]     DIAGNOSIS:  Cancer Staging  No matching staging information was found for the patient. TREATMENT DETAILS:  Treatment Site: pelvic LN  Actual Dose:2200 cGy  Total Planned Dose: 4000 cGy  Treatment Technique: IMRT  Fraction Technique: Daily  Therapy imaging monitoring: CBCT daily  Concurrent Chemotherapy: After    SUBJECTIVE:   Patient seen for their weekly on treatment evaluation today. She denies side effects.     OBJECTIVE:   CHAPERONE: Nurse/MA Present    ECO Asymptomatic    VITAL SIGNS: /66   Pulse 94   Temp 97 °F (36.1 °C) (Temporal)   Resp 16   Wt 157 lb 3.2 oz (71.3 kg)   SpO2 97%   BMI 28.75 kg/m²   Wt Readings from Last 5 Encounters:   10/18/22 157 lb 3.2 oz (71.3 kg)   10/11/22 156 lb 12.8 oz (71.1 kg)   22 157 lb 6.4 oz (71.4 kg)   22 157 lb 12.8 oz (71.6 kg)   22 156 lb 14.4 oz (71.2 kg)     @[facility]@    LABS:  WBC   Date Value Ref Range Status   2022 4.5 3.5 - 11.3 k/uL Final   2022 6.1 3.5 - 11.3 k/uL Final   2022 5.2 3.5 - 11.3 k/uL Final     Segs Absolute   Date Value Ref Range Status   2022 1.87 1.50 - 8.10 k/uL Final   2022 3.64 1.50 - 8.10 k/uL Final   2022 3.19 1.50 - 8.10 k/uL Final     Hemoglobin   Date Value Ref Range Status   2022 12.3 11.9 - 15.1 g/dL Final   2022 11.4 (L) 11.9 - 15.1 g/dL Final 07/18/2022 11.5 (L) 11.9 - 15.1 g/dL Final     Platelets   Date Value Ref Range Status   08/22/2022 189 138 - 453 k/uL Final   08/08/2022 406 138 - 453 k/uL Final   07/18/2022 255 138 - 453 k/uL Final     Creatinine   Date Value Ref Range Status   08/22/2022 0.60 0.50 - 0.90 mg/dL Final   08/08/2022 0.63 0.50 - 0.90 mg/dL Final   07/18/2022 0.61 0.50 - 0.90 mg/dL Final     No results found for: , CEA  No results found for: PSA    MEDICATIONS:    Current Outpatient Medications:     gabapentin (NEURONTIN) 300 MG capsule, take 1 capsule by mouth every morning and at bedtime, Disp: 60 capsule, Rfl: 2    HYDROcodone-acetaminophen (NORCO) 5-325 MG per tablet, Take 1 tablet by mouth every 8 hours as needed for Pain for up to 30 days. , Disp: 90 tablet, Rfl: 0    ondansetron (ZOFRAN ODT) 8 MG TBDP disintegrating tablet, Take 1 tablet by mouth every 8 hours as needed for Nausea or Vomiting, Disp: 30 tablet, Rfl: 3    omeprazole (PRILOSEC) 20 MG delayed release capsule, take 1 capsule by mouth twice a day before meals, Disp: 60 capsule, Rfl: 2    atorvastatin (LIPITOR) 20 MG tablet, Take 1 tablet by mouth daily take 1 tablet by mouth once daily, Disp: 90 tablet, Rfl: 3    vitamin D (CHOLECALCIFEROL) 1000 UNIT TABS tablet, Take 1 tablet by mouth daily VITAMIN D3. OK to substitute, Disp: 90 tablet, Rfl: 3    Multiple Vitamin (MULTI VITAMIN PO), Take by mouth, Disp: , Rfl:       ASSESSMENT PLAN:   Treatment setup and plan reviewed. Port images/CBCT images reviewed. Appropriate laboratory work was reviewed. Treatment side effects and toxicities reviewed with the patient, and appropriate management was advised. Will continue radiation treatment as planned, and recommend patient contact us if they have any questions or concerns. She is amazed at the technology that enables her treatment.     Electronically signed by Sussy Inman MD on 10/18/2022 at 11:42 AM      Drugs Prescribed:  New Prescriptions    No medications on file       Other Orders Placed:  No orders of the defined types were placed in this encounter.

## 2022-10-19 ENCOUNTER — HOSPITAL ENCOUNTER (OUTPATIENT)
Dept: RADIATION ONCOLOGY | Facility: MEDICAL CENTER | Age: 72
Discharge: HOME | End: 2022-10-19
Payer: MEDICARE

## 2022-10-19 PROCEDURE — 77014 PR CT GUIDANCE PLACEMENT RAD THERAPY FIELDS: CPT | Performed by: RADIOLOGY

## 2022-10-19 PROCEDURE — 77386 HC NTSTY MODUL RAD TX DLVR CPLX: CPT | Performed by: RADIOLOGY

## 2022-10-19 PROCEDURE — 77336 RADIATION PHYSICS CONSULT: CPT

## 2022-10-20 ENCOUNTER — HOSPITAL ENCOUNTER (OUTPATIENT)
Dept: RADIATION ONCOLOGY | Facility: MEDICAL CENTER | Age: 72
Discharge: HOME | End: 2022-10-20
Payer: MEDICARE

## 2022-10-20 ENCOUNTER — TELEPHONE (OUTPATIENT)
Dept: ONCOLOGY | Age: 72
End: 2022-10-20

## 2022-10-20 PROCEDURE — 77014 PR CT GUIDANCE PLACEMENT RAD THERAPY FIELDS: CPT | Performed by: RADIOLOGY

## 2022-10-20 PROCEDURE — 77386 HC NTSTY MODUL RAD TX DLVR CPLX: CPT | Performed by: RADIOLOGY

## 2022-10-20 NOTE — TELEPHONE ENCOUNTER
[facility] Radiation Oncology Nutrition Note:    Called pt at number in chart regarding PG-SGA scores. Call went directly to voicemail, left detailed message for call back.       Steve Fonseca, MS, RD, LD  Registered Dietitian  [facility][note 3]  [phone]

## 2022-10-21 ENCOUNTER — HOSPITAL ENCOUNTER (OUTPATIENT)
Dept: RADIATION ONCOLOGY | Facility: MEDICAL CENTER | Age: 72
Discharge: HOME | End: 2022-10-21
Payer: MEDICARE

## 2022-10-21 PROCEDURE — 77386 HC NTSTY MODUL RAD TX DLVR CPLX: CPT | Performed by: RADIOLOGY

## 2022-10-21 PROCEDURE — 77014 PR CT GUIDANCE PLACEMENT RAD THERAPY FIELDS: CPT | Performed by: RADIOLOGY

## 2022-10-24 ENCOUNTER — HOSPITAL ENCOUNTER (OUTPATIENT)
Dept: RADIATION ONCOLOGY | Facility: MEDICAL CENTER | Age: 72
Discharge: HOME | End: 2022-10-24
Payer: MEDICARE

## 2022-10-24 PROCEDURE — 77014 PR CT GUIDANCE PLACEMENT RAD THERAPY FIELDS: CPT | Performed by: RADIOLOGY

## 2022-10-24 PROCEDURE — 77386 HC NTSTY MODUL RAD TX DLVR CPLX: CPT | Performed by: RADIOLOGY

## 2022-10-25 ENCOUNTER — HOSPITAL ENCOUNTER (OUTPATIENT)
Dept: RADIATION ONCOLOGY | Facility: MEDICAL CENTER | Age: 72
Discharge: HOME | End: 2022-10-25
Payer: MEDICARE

## 2022-10-25 PROCEDURE — 77386 HC NTSTY MODUL RAD TX DLVR CPLX: CPT | Performed by: RADIOLOGY

## 2022-10-25 PROCEDURE — 77014 PR CT GUIDANCE PLACEMENT RAD THERAPY FIELDS: CPT | Performed by: RADIOLOGY

## 2022-10-26 ENCOUNTER — HOSPITAL ENCOUNTER (OUTPATIENT)
Dept: RADIATION ONCOLOGY | Facility: MEDICAL CENTER | Age: 72
Discharge: HOME | End: 2022-10-26
Payer: MEDICARE

## 2022-10-26 DIAGNOSIS — C83.33 DIFFUSE LARGE B-CELL LYMPHOMA OF INTRA-ABDOMINAL LYMPH NODES (HCC): ICD-10-CM

## 2022-10-26 PROCEDURE — 77386 HC NTSTY MODUL RAD TX DLVR CPLX: CPT

## 2022-10-26 PROCEDURE — 77014 PR CT GUIDANCE PLACEMENT RAD THERAPY FIELDS: CPT | Performed by: RADIOLOGY

## 2022-10-27 ENCOUNTER — HOSPITAL ENCOUNTER (OUTPATIENT)
Dept: RADIATION ONCOLOGY | Facility: MEDICAL CENTER | Age: 72
Discharge: HOME | End: 2022-10-27
Payer: MEDICARE

## 2022-10-27 VITALS
DIASTOLIC BLOOD PRESSURE: 77 MMHG | HEART RATE: 80 BPM | RESPIRATION RATE: 16 BRPM | BODY MASS INDEX: 28.97 KG/M2 | TEMPERATURE: 97.9 F | WEIGHT: 158.4 LBS | OXYGEN SATURATION: 98 % | SYSTOLIC BLOOD PRESSURE: 134 MMHG

## 2022-10-27 PROCEDURE — 77014 PR CT GUIDANCE PLACEMENT RAD THERAPY FIELDS: CPT | Performed by: RADIOLOGY

## 2022-10-27 PROCEDURE — 77386 HC NTSTY MODUL RAD TX DLVR CPLX: CPT

## 2022-10-27 PROCEDURE — 77427 RADIATION TX MANAGEMENT X5: CPT | Performed by: RADIOLOGY

## 2022-10-27 RX ORDER — OMEPRAZOLE 20 MG/1
CAPSULE, DELAYED RELEASE ORAL
Qty: 60 CAPSULE | Refills: 2 | Status: SHIPPED | OUTPATIENT
Start: 2022-10-27

## 2022-10-27 NOTE — PROGRESS NOTES
Nallely Sharron  10/27/2022  Wt Readings from Last 3 Encounters:   10/27/22 158 lb 6.4 oz (71.8 kg)   10/18/22 157 lb 3.2 oz (71.3 kg)   10/11/22 156 lb 12.8 oz (71.1 kg)     Body mass index is 28.97 kg/m². Treatment Area: abdominal nodes, treatment 18/20        Comfort Alteration  Fatigue: Mild      Nutritional Alteration  Anorexia: Yes  Nausea: No   Vomiting: No       Elimination Alterations  Constipation: no  Diarrhea:  no    Skin Alteration   Sensation:intact    Radiation Dermatitis:  Intact [x]     Erythema  []     Discoloration  []     Rash []     Dry desquamation  []     Moist desquamation []       Emotional  Coping: effective      Injury, potential bleeding or infection: none    Lab Results   Component Value Date    WBC 4.5 08/22/2022     08/22/2022         /77   Pulse 80   Temp 97.9 °F (36.6 °C) (Temporal)   Resp 16   Wt 158 lb 6.4 oz (71.8 kg)   SpO2 98%   BMI 28.97 kg/m²      Pain Assessment: None - Denies Pain              Assessment/Plan: Patient was seen today for weekly visit. States appetite \"off\" due to taste changes from chemo. Reviewed ways to season foods to increase palatability. She voices understanding and appreciation. Dr. Lyudmila Malone notified of assessment and examined patient. She will complete therapy on Monday, 10/31/22 and follow up here in 6 weeks.     Teagan Magallanes RN

## 2022-10-28 ENCOUNTER — HOSPITAL ENCOUNTER (OUTPATIENT)
Facility: MEDICAL CENTER | Age: 72
End: 2022-10-28
Payer: MEDICARE

## 2022-10-28 ENCOUNTER — HOSPITAL ENCOUNTER (OUTPATIENT)
Dept: RADIATION ONCOLOGY | Facility: MEDICAL CENTER | Age: 72
Discharge: HOME | End: 2022-10-28
Payer: MEDICARE

## 2022-10-28 PROCEDURE — 77386 HC NTSTY MODUL RAD TX DLVR CPLX: CPT

## 2022-10-28 PROCEDURE — 77014 PR CT GUIDANCE PLACEMENT RAD THERAPY FIELDS: CPT | Performed by: RADIOLOGY

## 2022-10-31 ENCOUNTER — OFFICE VISIT (OUTPATIENT)
Dept: ONCOLOGY | Age: 72
End: 2022-10-31
Payer: MEDICARE

## 2022-10-31 ENCOUNTER — HOSPITAL ENCOUNTER (OUTPATIENT)
Dept: RADIATION ONCOLOGY | Facility: MEDICAL CENTER | Age: 72
Discharge: HOME | End: 2022-10-31
Payer: MEDICARE

## 2022-10-31 ENCOUNTER — HOSPITAL ENCOUNTER (OUTPATIENT)
Dept: INFUSION THERAPY | Facility: MEDICAL CENTER | Age: 72
Discharge: HOME | End: 2022-10-31
Payer: MEDICARE

## 2022-10-31 ENCOUNTER — TELEPHONE (OUTPATIENT)
Dept: ONCOLOGY | Age: 72
End: 2022-10-31

## 2022-10-31 VITALS
DIASTOLIC BLOOD PRESSURE: 74 MMHG | HEART RATE: 103 BPM | RESPIRATION RATE: 16 BRPM | SYSTOLIC BLOOD PRESSURE: 116 MMHG | TEMPERATURE: 98 F

## 2022-10-31 VITALS
HEART RATE: 103 BPM | WEIGHT: 156.1 LBS | TEMPERATURE: 98 F | RESPIRATION RATE: 18 BRPM | DIASTOLIC BLOOD PRESSURE: 74 MMHG | SYSTOLIC BLOOD PRESSURE: 116 MMHG | BODY MASS INDEX: 28.55 KG/M2

## 2022-10-31 DIAGNOSIS — G89.3 CANCER ASSOCIATED PAIN: ICD-10-CM

## 2022-10-31 DIAGNOSIS — D69.6 THROMBOCYTOPENIA, UNSPECIFIED (HCC): ICD-10-CM

## 2022-10-31 DIAGNOSIS — Z51.11 ENCOUNTER FOR CHEMOTHERAPY MANAGEMENT: ICD-10-CM

## 2022-10-31 DIAGNOSIS — C83.33 DIFFUSE LARGE B-CELL LYMPHOMA OF INTRA-ABDOMINAL LYMPH NODES (HCC): Primary | ICD-10-CM

## 2022-10-31 DIAGNOSIS — R63.4 WEIGHT LOSS: ICD-10-CM

## 2022-10-31 LAB
ABSOLUTE EOS #: 0.14 K/UL (ref 0–0.4)
ABSOLUTE IMMATURE GRANULOCYTE: 0 K/UL (ref 0–0.3)
ABSOLUTE LYMPH #: 0.46 K/UL (ref 1–4.8)
ABSOLUTE MONO #: 0.51 K/UL (ref 0.2–0.8)
ALBUMIN SERPL-MCNC: 3.6 G/DL (ref 3.5–5.2)
ALP BLD-CCNC: 65 U/L (ref 35–104)
ALT SERPL-CCNC: 9 U/L (ref 5–33)
ANION GAP SERPL CALCULATED.3IONS-SCNC: 8 MMOL/L (ref 9–17)
AST SERPL-CCNC: 15 U/L
BASOPHILS # BLD: 0 %
BASOPHILS ABSOLUTE: 0 K/UL (ref 0–0.2)
BILIRUB SERPL-MCNC: 0.3 MG/DL (ref 0.3–1.2)
BUN BLDV-MCNC: 8 MG/DL (ref 8–23)
BUN/CREAT BLD: 15 (ref 9–20)
CALCIUM SERPL-MCNC: 9.5 MG/DL (ref 8.6–10.4)
CHLORIDE BLD-SCNC: 106 MMOL/L (ref 98–107)
CO2: 28 MMOL/L (ref 20–31)
CREAT SERPL-MCNC: 0.53 MG/DL (ref 0.5–0.9)
EOSINOPHILS RELATIVE PERCENT: 5 % (ref 1–4)
GFR SERPL CREATININE-BSD FRML MDRD: >60 ML/MIN/1.73M2
GLUCOSE BLD-MCNC: 111 MG/DL (ref 70–99)
HCT VFR BLD CALC: 36.9 % (ref 36.3–47.1)
HEMOGLOBIN: 12 G/DL (ref 11.9–15.1)
IMMATURE GRANULOCYTES: 0 %
LYMPHOCYTES # BLD: 17 % (ref 24–44)
MCH RBC QN AUTO: 32.3 PG (ref 25.2–33.5)
MCHC RBC AUTO-ENTMCNC: 32.5 G/DL (ref 28.4–34.8)
MCV RBC AUTO: 99.2 FL (ref 82.6–102.9)
MONOCYTES # BLD: 19 % (ref 1–7)
NRBC AUTOMATED: 0 PER 100 WBC
PDW BLD-RTO: 12.6 % (ref 11.8–14.4)
PLATELET # BLD: 124 K/UL (ref 138–453)
PMV BLD AUTO: 8.9 FL (ref 8.1–13.5)
POTASSIUM SERPL-SCNC: 4.1 MMOL/L (ref 3.7–5.3)
RBC # BLD: 3.72 M/UL (ref 3.95–5.11)
SEG NEUTROPHILS: 59 % (ref 36–66)
SEGMENTED NEUTROPHILS ABSOLUTE COUNT: 1.59 K/UL (ref 1.8–7.7)
SODIUM BLD-SCNC: 142 MMOL/L (ref 135–144)
TOTAL PROTEIN: 6.2 G/DL (ref 6.4–8.3)
WBC # BLD: 2.7 K/UL (ref 3.5–11.3)

## 2022-10-31 PROCEDURE — G8417 CALC BMI ABV UP PARAM F/U: HCPCS | Performed by: INTERNAL MEDICINE

## 2022-10-31 PROCEDURE — 36591 DRAW BLOOD OFF VENOUS DEVICE: CPT

## 2022-10-31 PROCEDURE — 99211 OFF/OP EST MAY X REQ PHY/QHP: CPT | Performed by: INTERNAL MEDICINE

## 2022-10-31 PROCEDURE — G8484 FLU IMMUNIZE NO ADMIN: HCPCS | Performed by: INTERNAL MEDICINE

## 2022-10-31 PROCEDURE — 1036F TOBACCO NON-USER: CPT | Performed by: INTERNAL MEDICINE

## 2022-10-31 PROCEDURE — 3017F COLORECTAL CA SCREEN DOC REV: CPT | Performed by: INTERNAL MEDICINE

## 2022-10-31 PROCEDURE — 85025 COMPLETE CBC W/AUTO DIFF WBC: CPT

## 2022-10-31 PROCEDURE — G8399 PT W/DXA RESULTS DOCUMENT: HCPCS | Performed by: INTERNAL MEDICINE

## 2022-10-31 PROCEDURE — 77014 PR CT GUIDANCE PLACEMENT RAD THERAPY FIELDS: CPT | Performed by: RADIOLOGY

## 2022-10-31 PROCEDURE — 6360000002 HC RX W HCPCS: Performed by: INTERNAL MEDICINE

## 2022-10-31 PROCEDURE — 99214 OFFICE O/P EST MOD 30 MIN: CPT | Performed by: INTERNAL MEDICINE

## 2022-10-31 PROCEDURE — 1123F ACP DISCUSS/DSCN MKR DOCD: CPT | Performed by: INTERNAL MEDICINE

## 2022-10-31 PROCEDURE — G8427 DOCREV CUR MEDS BY ELIG CLIN: HCPCS | Performed by: INTERNAL MEDICINE

## 2022-10-31 PROCEDURE — 77386 HC NTSTY MODUL RAD TX DLVR CPLX: CPT

## 2022-10-31 PROCEDURE — 80053 COMPREHEN METABOLIC PANEL: CPT

## 2022-10-31 PROCEDURE — 96523 IRRIG DRUG DELIVERY DEVICE: CPT

## 2022-10-31 PROCEDURE — 1090F PRES/ABSN URINE INCON ASSESS: CPT | Performed by: INTERNAL MEDICINE

## 2022-10-31 PROCEDURE — 2580000003 HC RX 258: Performed by: INTERNAL MEDICINE

## 2022-10-31 RX ORDER — HEPARIN SODIUM (PORCINE) LOCK FLUSH IV SOLN 100 UNIT/ML 100 UNIT/ML
500 SOLUTION INTRAVENOUS PRN
OUTPATIENT
Start: 2022-10-31

## 2022-10-31 RX ORDER — SODIUM CHLORIDE 0.9 % (FLUSH) 0.9 %
5-40 SYRINGE (ML) INJECTION PRN
Status: DISCONTINUED | OUTPATIENT
Start: 2022-10-31 | End: 2022-11-01 | Stop reason: HOSPADM

## 2022-10-31 RX ORDER — HEPARIN SODIUM (PORCINE) LOCK FLUSH IV SOLN 100 UNIT/ML 100 UNIT/ML
500 SOLUTION INTRAVENOUS PRN
Status: DISCONTINUED | OUTPATIENT
Start: 2022-10-31 | End: 2022-11-01 | Stop reason: HOSPADM

## 2022-10-31 RX ORDER — SODIUM CHLORIDE 9 MG/ML
25 INJECTION, SOLUTION INTRAVENOUS PRN
OUTPATIENT
Start: 2022-10-31

## 2022-10-31 RX ORDER — SODIUM CHLORIDE 0.9 % (FLUSH) 0.9 %
5-40 SYRINGE (ML) INJECTION PRN
OUTPATIENT
Start: 2022-10-31

## 2022-10-31 RX ORDER — SODIUM CHLORIDE 9 MG/ML
25 INJECTION, SOLUTION INTRAVENOUS PRN
Status: CANCELLED | OUTPATIENT
Start: 2022-10-31

## 2022-10-31 RX ADMIN — SODIUM CHLORIDE, PRESERVATIVE FREE 20 ML: 5 INJECTION INTRAVENOUS at 10:47

## 2022-10-31 RX ADMIN — SODIUM CHLORIDE, PRESERVATIVE FREE 20 ML: 5 INJECTION INTRAVENOUS at 12:04

## 2022-10-31 RX ADMIN — HEPARIN 500 UNITS: 100 SYRINGE at 12:04

## 2022-10-31 NOTE — TELEPHONE ENCOUNTER
Beba Koehler MD VISIT & TX  Rv in 3 months with labs and scans   CT C/A/P IS ON 1/17/23 @ 11:15AM AT [address] [place] [address] Drive ARRIVAL @ 10:15AM  MD VISIT 1/23/23 @ 10:15AM TX @ 9:30AM  AVS PRINTED W/ INSTRUCTIONS AND GIVEN TO PT ON EXIT
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
## 2022-10-31 NOTE — PROGRESS NOTES
[facility]            Radiation Oncology          [address]       O: [phone]       [email or web address]             RADIATION ONCOLOGY WEEKLY PROGRESS NOTE  Patient ID:   Nupur Batch  : 1950   MRN: [id number]    Location:  [facility] [address] [place]   [phone]    DIAGNOSIS:  Cancer Staging  No matching staging information was found for the patient. TREATMENT DETAILS:  Treatment Site: Intra-abdominal Nodes 3006  Actual Dose: 3600 cGy  Total Planned Dose: 4000 cGy  Treatment Technique: IMRT  Fraction Technique: Daily  Therapy imaging monitoring: CBCT daily  Concurrent Chemotherapy: AS directed    SUBJECTIVE:   Patient seen for their weekly on treatment evaluation today. OBJECTIVE:   CHAPERONE: Declined    ECO Asymptomatic    VITAL SIGNS: /77   Pulse 80   Temp 97.9 °F (36.6 °C) (Temporal)   Resp 16   Wt 158 lb 6.4 oz (71.8 kg)   SpO2 98%   BMI 28.97 kg/m²   Wt Readings from Last 5 Encounters:   10/27/22 158 lb 6.4 oz (71.8 kg)   10/18/22 157 lb 3.2 oz (71.3 kg)   10/11/22 156 lb 12.8 oz (71.1 kg)   22 157 lb 6.4 oz (71.4 kg)   22 157 lb 12.8 oz (71.6 kg)     GENERAL:  General appearance is that of a well-nourished, well-developed in no apparent distress. HEENT: Normocephalic, atraumatic, EOMI, moist mucosa, no erythema. Indirect exam .  NECK:  No adenopathy or a palpable thyroid mass, trachea is midline. LYMPHATICS: No cervical, supraclavicular, or axillary adenopathy. HEART:  Normal rate and regular rhythm, normal heart sounds. LUNGS:  Pulmonary effort normal. Normal breath sounds. ABDOMEN:  Soft, nontender, non distended, and no hepatosplenomegaly. EXTREMITIES:  No edema. No calf tenderness. MSK:  No spinal tenderness. Normal ROM. NEUROLOGICAL: Alert and oriented. Strength and sensation intact bilaterally. No focal deficits. PSYCH: Mood normal, behavior normal.  SKIN: No erythema, no desquamation. RECTAL: Deferred   GYN: Deferred   BREAST: Deferred     LABS:  WBC   Date Value Ref Range Status   08/22/2022 4.5 3.5 - 11.3 k/uL Final   08/08/2022 6.1 3.5 - 11.3 k/uL Final   07/18/2022 5.2 3.5 - 11.3 k/uL Final     Segs Absolute   Date Value Ref Range Status   08/22/2022 1.87 1.50 - 8.10 k/uL Final   08/08/2022 3.64 1.50 - 8.10 k/uL Final   07/18/2022 3.19 1.50 - 8.10 k/uL Final     Hemoglobin   Date Value Ref Range Status   08/22/2022 12.3 11.9 - 15.1 g/dL Final   08/08/2022 11.4 (L) 11.9 - 15.1 g/dL Final   07/18/2022 11.5 (L) 11.9 - 15.1 g/dL Final     Platelets   Date Value Ref Range Status   08/22/2022 189 138 - 453 k/uL Final   08/08/2022 406 138 - 453 k/uL Final   07/18/2022 255 138 - 453 k/uL Final     Creatinine   Date Value Ref Range Status   08/22/2022 0.60 0.50 - 0.90 mg/dL Final   08/08/2022 0.63 0.50 - 0.90 mg/dL Final   07/18/2022 0.61 0.50 - 0.90 mg/dL Final     No results found for: , CEA  No results found for: PSA    MEDICATIONS:    Current Outpatient Medications:     omeprazole (PRILOSEC) 20 MG delayed release capsule, take 1 capsule by mouth twice a day before meals, Disp: 60 capsule, Rfl: 2    gabapentin (NEURONTIN) 300 MG capsule, take 1 capsule by mouth every morning and at bedtime, Disp: 60 capsule, Rfl: 2    ondansetron (ZOFRAN ODT) 8 MG TBDP disintegrating tablet, Take 1 tablet by mouth every 8 hours as needed for Nausea or Vomiting, Disp: 30 tablet, Rfl: 3    atorvastatin (LIPITOR) 20 MG tablet, Take 1 tablet by mouth daily take 1 tablet by mouth once daily, Disp: 90 tablet, Rfl: 3    vitamin D (CHOLECALCIFEROL) 1000 UNIT TABS tablet, Take 1 tablet by mouth daily VITAMIN D3. OK to substitute, Disp: 90 tablet, Rfl: 3    Multiple Vitamin (MULTI VITAMIN PO), Take by mouth, Disp: , Rfl:   No current facility-administered medications for this encounter.     Facility-Administered Medications Ordered in Other Encounters:     sodium chloride flush 0.9 % injection 5-40 mL, 5-40 mL, IntraVENous, PRN, Pancho Cavanaugh MD    heparin flush 100 UNIT/ML injection 500 Units, 500 Units, IntraCATHeter, PRN, Pancho Cavanaugh MD      ASSESSMENT PLAN:   Treatment setup and plan reviewed. Port images/CBCT images reviewed. Appropriate laboratory work was reviewed. Treatment side effects and toxicities reviewed with the patient, and appropriate management was advised. Will continue radiation treatment as planned, and recommend patient contact us if they have any questions or concerns. Tolerating radiation therapy reasonably well. We will monitor her for further side effects and advise her on action for such. Electronically signed by Melvina Umaña MD on 10/31/2022 at 10:08 AM      Drugs Prescribed:  New Prescriptions    No medications on file       Other Orders Placed:  No orders of the defined types were placed in this encounter.

## 2022-10-31 NOTE — PROGRESS NOTES
Patient arrived ambulatory for port draw and MD visit. Denies complaint or concern. Port accessed;specimen sent. Labs reviewed. Physician at bedside. MD visit complete. Port flushed and heparinized with intact horne needle removed per protocol, Band-Aid applied to site.   Pt discharged instructed to stop at  for AVS.

## 2022-11-01 ENCOUNTER — TELEPHONE (OUTPATIENT)
Dept: ONCOLOGY | Age: 72
End: 2022-11-01

## 2022-11-01 NOTE — TELEPHONE ENCOUNTER
Name: Jyoti Qureshi  : 1950  MRN: [id number]    Oncology Navigation Follow-Up Note    Contact Type:  Telephone    Notes: Attempted to contact pt for ONN f/u. No answer, VM left encouraging pt to return writer's call with any concerns or barriers they may have. Provided writer's contact information in message. Will continue to follow.      Electronically signed by Arabella Ye RN on 2022 at 11:33 AM

## 2022-11-03 ENCOUNTER — TELEPHONE (OUTPATIENT)
Dept: ONCOLOGY | Age: 72
End: 2022-11-03

## 2022-11-03 NOTE — TELEPHONE ENCOUNTER
Eloisa Jones Radiation Oncology Nutrition Note:     Called pt at number in chart regarding PG-SGA scores. Call went directly to voicemail, left detailed message for call back.        YANET Vargas, RD, LD  Registered Dietitian   [facility]  [phone]

## 2022-11-07 ENCOUNTER — TELEPHONE (OUTPATIENT)
Dept: ONCOLOGY | Age: 72
End: 2022-11-07

## 2022-11-07 NOTE — TELEPHONE ENCOUNTER
Alanna Art Radiation Oncology Nutrition Note:     Called pt at number in chart regarding PG-SGA scores. Call went directly to voicemail, left detailed message for call back.        YANET Lock, RD, LD  Registered Dietitian      [facility]  [phone]

## 2022-11-14 ENCOUNTER — TELEPHONE (OUTPATIENT)
Dept: INFUSION THERAPY | Age: 72
End: 2022-11-14

## 2022-11-14 NOTE — TELEPHONE ENCOUNTER
Scotty Alu Radiation Oncology Nutrition Note:     Called pt at number in chart regarding PG-SGA scores. Call went directly to voicemail, left detailed message for call back.        YANET Sewell, RD, LD  Registered Dietitian      [facility]  [phone]

## 2022-12-01 ENCOUNTER — TELEPHONE (OUTPATIENT)
Dept: ONCOLOGY | Age: 72
End: 2022-12-01

## 2022-12-09 ENCOUNTER — HOSPITAL ENCOUNTER (OUTPATIENT)
Dept: RADIATION ONCOLOGY | Facility: MEDICAL CENTER | Age: 72
Discharge: HOME | End: 2022-12-09

## 2022-12-09 VITALS
SYSTOLIC BLOOD PRESSURE: 135 MMHG | BODY MASS INDEX: 28.61 KG/M2 | DIASTOLIC BLOOD PRESSURE: 68 MMHG | WEIGHT: 156.4 LBS | OXYGEN SATURATION: 99 %

## 2022-12-09 NOTE — PROGRESS NOTES
[facility]            Radiation Oncology          [place] [facility], [address]       O: [phone]       [email or web address]           Date of Service: 2022     Location:  [facility] Florentin Ware,   [address] [facility], [place]   [phone]       RADIATION ONCOLOGY FOLLOW UP NOTE    Patient ID:   Shayla Gómez  : 1950   MRN: [id number]    DIAGNOSIS:  Cancer Staging  No matching staging information was found for the patient. INTERVAL HISTORY:   Shayla Gómez is a 67 y.o. 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 She is status post radiation therapy and chemo concluding radiation therapy 6 weeks ago on 10/31/2022'    Currently, she is doing well and denies any fevers, chills, night sweats. She did have a upper respiratory infection and is wearing a mask. She is gradually recovering from it. Activity level is improving. She denies any nausea, vomiting, or other side effects attributable to her radiation therapy.       AUA Score:     MEDICATIONS:    Current Outpatient Medications:     omeprazole (PRILOSEC) 20 MG delayed release capsule, take 1 capsule by mouth twice a day before meals, Disp: 60 capsule, Rfl: 2    gabapentin (NEURONTIN) 300 MG capsule, take 1 capsule by mouth every morning and at bedtime, Disp: 60 capsule, Rfl: 2    ondansetron (ZOFRAN ODT) 8 MG TBDP disintegrating tablet, Take 1 tablet by mouth every 8 hours as needed for Nausea or Vomiting, Disp: 30 tablet, Rfl: 3    atorvastatin (LIPITOR) 20 MG tablet, Take 1 tablet by mouth daily take 1 tablet by mouth once daily, Disp: 90 tablet, Rfl: 3    vitamin D (CHOLECALCIFEROL) 1000 UNIT TABS tablet, Take 1 tablet by mouth daily VITAMIN D3. OK to substitute, Disp: 90 tablet, Rfl: 3    Multiple Vitamin (MULTI VITAMIN PO), Take by mouth, Disp: , Rfl:     ALLERGIES:  No Known Allergies      REVIEW OF SYSTEMS:    A full 14 point review of systems was performed and assessed and found to be negative except as noted above. PHYSICAL EXAMINATION:    CHAPERONE: Not Required    ECO Asymptomatic    VITAL SIGNS: There were no vitals taken for this visit. GENERAL:  General appearance is that of a well-nourished, well-developed in no apparent distress. HEENT: Normocephalic, atraumatic, EOMI, moist mucosa, no erythema. Indirect exam .  NECK:  No adenopathy or a palpable thyroid mass, trachea is midline. LYMPHATICS: No cervical, supraclavicular, or axillary adenopathy. HEART:  Normal rate and regular rhythm, normal heart sounds. LUNGS:  Pulmonary effort normal. Normal breath sounds. ABDOMEN:  Soft, nontender, non distended, and no hepatosplenomegaly. EXTREMITIES:  No edema. No calf tenderness. MSK:  No spinal tenderness. Normal ROM. NEUROLOGICAL: Alert and oriented. Strength and sensation intact bilaterally. No focal deficits. PSYCH: Mood normal, behavior normal.  SKIN: No erythema, no desquamation. RECTAL: Deferred   GYN: Deferred   BREAST: Deferred       LABS:  WBC   Date Value Ref Range Status   10/31/2022 2.7 (L) 3.5 - 11.3 k/uL Final     Segs Absolute   Date Value Ref Range Status   10/31/2022 1.59 (L) 1.8 - 7.7 k/uL Final     Hemoglobin   Date Value Ref Range Status   10/31/2022 12.0 11.9 - 15.1 g/dL Final     Platelets   Date Value Ref Range Status   10/31/2022 124 (L) 138 - 453 k/uL Final     No results found for: , CEA  No results found for: PSA    IMAGING:   Pending      ASSESSMENT AND PLAN:  Az Henriquez is a 67 y.o. female with a Cancer Staging  No matching staging information was found for the patient. Nora Sanches He is doing well presently. She is scheduled for a CAT scan to follow her progress that is due around the middle of January. She wishes to follow with her medical oncologist and does not want to go to [facility] presently. She states that she will keep her options open.   We advised this lady that she is more than welcome at our center if indeed the need arises in the future. Patient is recommended to come back in prn months for another follow up visit and exam, or can call to come see us sooner if symptoms change. Patient was in agreement with my recommendations. All questions were answered to their satisfaction. Patient was advised to contact us anytime should they have any questions or concerns. Electronically signed by Naomi Mast MD on 12/9/2022 at 10:36 AM        Medications Prescribed:   New Prescriptions    No medications on file       Orders: No orders of the defined types were placed in this encounter. CC:  Patient Care Team:  Myles Goldberg MD as PCP - General (Family Medicine)  Shauna Farley MD as PCP - [facility]  Angelika Burgos RN as Nurse Navigator (Oncology)  Barry Dwyer MD as Consulting Physician (Radiation Oncology)  Yvette Meléndez RD as Dietitian (Dietitian Registered)  Sharlet Bamberger, RD, FARHAN as Dietitian (Dietitian Registered)     Total time spent on this case on the day of encounter is 30 minutes. This time includes combination of one or more of the following - review of necessary tests, review of pertinent medical records from the EMR, performing medically appropriate examination and evaluation, counseling and educating the patient/family/caregiver, ordering necessary medical tests, procedures etc., documenting the clinical information in the electronic medical record, care coordination, referring and communicating with other health care providers and interpretation of results independently. This note is created with the assistance of a speech recognition program.  While intending to generate a document that actually reflects the content of the visit, the document can still have some errors including those of syntax and sound a like substitutions which may escape proof reading.   It such instances, actual meaning can be extrapolated by contextual

## 2022-12-16 ENCOUNTER — E-VISIT (OUTPATIENT)
Dept: FAMILY MEDICINE CLINIC | Age: 72
End: 2022-12-16

## 2022-12-16 DIAGNOSIS — J20.9 ACUTE BRONCHITIS DUE TO INFECTION: ICD-10-CM

## 2022-12-16 DIAGNOSIS — J01.80 ACUTE NON-RECURRENT SINUSITIS OF OTHER SINUS: Primary | ICD-10-CM

## 2022-12-16 DIAGNOSIS — H92.01 RIGHT EAR PAIN: ICD-10-CM

## 2022-12-16 RX ORDER — AZITHROMYCIN 250 MG/1
TABLET, FILM COATED ORAL
Qty: 6 TABLET | Refills: 0 | Status: SHIPPED | OUTPATIENT
Start: 2022-12-16 | End: 2022-12-21

## 2022-12-16 RX ORDER — NEOMYCIN SULFATE, POLYMYXIN B SULFATE, HYDROCORTISONE 3.5; 10000; 1 MG/ML; [USP'U]/ML; MG/ML
2 SOLUTION/ DROPS AURICULAR (OTIC) EVERY 8 HOURS SCHEDULED
Qty: 10 ML | Refills: 1 | Status: SHIPPED | OUTPATIENT
Start: 2022-12-16

## 2022-12-16 RX ORDER — BENZONATATE 100 MG/1
100 CAPSULE ORAL 3 TIMES DAILY PRN
Qty: 21 CAPSULE | Refills: 0 | Status: SHIPPED | OUTPATIENT
Start: 2022-12-16 | End: 2022-12-23

## 2022-12-16 ASSESSMENT — LIFESTYLE VARIABLES: SMOKING_STATUS: NO, I'VE NEVER SMOKED

## 2022-12-16 NOTE — PROGRESS NOTES
Nallely Mcdermott (1950) initiated an asynchronous digital communication through [employer]. Date of service: 12/16/2022     HPI: per patient's questionnaire    EXAM: not applicable    Diagnoses and all orders for this visit:    1. Acute non-recurrent sinusitis of other sinus  Worsening  - azithromycin (ZITHROMAX) 250 MG tablet; 500 mg orally on day one followed by 250 mg daily on days two through five  Dispense: 6 tablet; Refill: 0  - benzonatate (TESSALON PERLES) 100 MG capsule; Take 1 capsule by mouth 3 times daily as needed for Cough  Dispense: 21 capsule; Refill: 0  - neomycin-polymyxin-hydrocortisone 1 % SOLN otic solution; Place 2 drops into the right ear every 8 hours For 10 days. OK to substitute. Dispense: 10 mL; Refill: 1    2. Acute bronchitis due to infection  Worsening  - azithromycin (ZITHROMAX) 250 MG tablet; 500 mg orally on day one followed by 250 mg daily on days two through five  Dispense: 6 tablet; Refill: 0  - benzonatate (TESSALON PERLES) 100 MG capsule; Take 1 capsule by mouth 3 times daily as needed for Cough  Dispense: 21 capsule; Refill: 0    3. Right ear pain  Worsening- azithromycin (ZITHROMAX) 250 MG tablet; 500 mg orally on day one followed by 250 mg daily on days two through five  Dispense: 6 tablet; Refill: 0  - neomycin-polymyxin-hydrocortisone 1 % SOLN otic solution; Place 2 drops into the right ear every 8 hours For 10 days. OK to substitute. Dispense: 10 mL; Refill: 1      No orders of the defined types were placed in this encounter.     Future Appointments   Date Time Provider Aleida Desir   1/17/2023 11:15 AM [facility] CT RM 1 [facility] CT SCAN [facility] Radiolog   1/23/2023  9:30 AM ST[facility] STA CHAIR 09 [facility] STA MED [place]   1/23/2023 10:15 AM Suellen Man MD [facility]   1/30/2023  8:00 [facility]   7/3/2023  7:30 AM [facility]         Patient was advised to contact PCP if symptoms worsen or failing to change as expected        More than 21 minutes were spent on the digital evaluation and management of this patient.   Electronically signed by Suki Epps MD on 12/16/22 at  10:07 AM

## 2023-01-03 ENCOUNTER — TELEPHONE (OUTPATIENT)
Dept: ONCOLOGY | Age: 73
End: 2023-01-03

## 2023-01-03 NOTE — TELEPHONE ENCOUNTER
Name: Carmel Ortiz  : 1950  MRN: [id number]    Oncology Navigation Follow-Up Note    Contact Type:  Telephone    Notes: Attempted to contact pt for ONN f/u. No answer. Multiple attempts have been made to contact pt with no return call. Writer left VM notifying pt that navigation will be discontinued but encouraged pt to reach out if she has any future concerns/barriers. Provided writer's contact information in message. Letter will also be sent to pt notifying her that navigation will be discontinued.          Electronically signed by Essie Jaeger RN on 1/3/2023 at 11:33 AM

## 2023-01-03 NOTE — LETTER
[address] [place] Harada      1/3/2023      [facility] [address]    Dear Haris Barclay:    As your Oncology Nurse Navigator, it is my responsibility to assist you in navigating your way through your oncology treatment. Unfortunately, I have not been able to reach you to provide you this assistance. Because I have not received any response from you, I will no longer be making attempts to contact you. I do encourage you to reach out to me at anytime that I can be of assistance in the coordination of your cancer care.     [address] Ja Francisco, RN   [phone]

## 2023-01-17 ENCOUNTER — HOSPITAL ENCOUNTER (OUTPATIENT)
Dept: CT IMAGING | Age: 73
Discharge: HOME | End: 2023-01-19
Payer: MEDICARE

## 2023-01-17 DIAGNOSIS — D69.6 THROMBOCYTOPENIA, UNSPECIFIED (HCC): ICD-10-CM

## 2023-01-17 DIAGNOSIS — R63.4 WEIGHT LOSS: ICD-10-CM

## 2023-01-17 DIAGNOSIS — C83.33 DIFFUSE LARGE B-CELL LYMPHOMA OF INTRA-ABDOMINAL LYMPH NODES (HCC): ICD-10-CM

## 2023-01-17 LAB
EGFR, POC: >60 ML/MIN/1.73M2
POC CREATININE: 0.4 MG/DL (ref 0.51–1.19)

## 2023-01-17 PROCEDURE — 82565 ASSAY OF CREATININE: CPT

## 2023-01-17 PROCEDURE — 6360000004 HC RX CONTRAST MEDICATION: Performed by: INTERNAL MEDICINE

## 2023-01-17 PROCEDURE — 2580000003 HC RX 258: Performed by: INTERNAL MEDICINE

## 2023-01-17 PROCEDURE — 71260 CT THORAX DX C+: CPT

## 2023-01-17 RX ORDER — SODIUM CHLORIDE 0.9 % (FLUSH) 0.9 %
10 SYRINGE (ML) INJECTION PRN
Status: DISCONTINUED | OUTPATIENT
Start: 2023-01-17 | End: 2023-01-20 | Stop reason: HOSPADM

## 2023-01-17 RX ORDER — 0.9 % SODIUM CHLORIDE 0.9 %
100 INTRAVENOUS SOLUTION INTRAVENOUS ONCE
Status: COMPLETED | OUTPATIENT
Start: 2023-01-17 | End: 2023-01-17

## 2023-01-17 RX ADMIN — IOPAMIDOL 100 ML: 755 INJECTION, SOLUTION INTRAVENOUS at 11:19

## 2023-01-17 RX ADMIN — SODIUM CHLORIDE 100 ML: 9 INJECTION, SOLUTION INTRAVENOUS at 11:20

## 2023-01-17 RX ADMIN — SODIUM CHLORIDE, PRESERVATIVE FREE 10 ML: 5 INJECTION INTRAVENOUS at 11:20

## 2023-01-20 ENCOUNTER — HOSPITAL ENCOUNTER (OUTPATIENT)
Facility: MEDICAL CENTER | Age: 73
End: 2023-01-20
Payer: MEDICARE

## 2023-01-23 ENCOUNTER — TELEPHONE (OUTPATIENT)
Dept: ONCOLOGY | Age: 73
End: 2023-01-23

## 2023-01-23 ENCOUNTER — OFFICE VISIT (OUTPATIENT)
Dept: ONCOLOGY | Age: 73
End: 2023-01-23
Payer: MEDICARE

## 2023-01-23 ENCOUNTER — HOSPITAL ENCOUNTER (OUTPATIENT)
Dept: INFUSION THERAPY | Facility: MEDICAL CENTER | Age: 73
Discharge: HOME | End: 2023-01-23
Payer: MEDICARE

## 2023-01-23 VITALS
DIASTOLIC BLOOD PRESSURE: 77 MMHG | HEART RATE: 95 BPM | TEMPERATURE: 98.1 F | RESPIRATION RATE: 16 BRPM | BODY MASS INDEX: 28.5 KG/M2 | SYSTOLIC BLOOD PRESSURE: 139 MMHG | WEIGHT: 155.8 LBS

## 2023-01-23 DIAGNOSIS — Z51.11 ENCOUNTER FOR CHEMOTHERAPY MANAGEMENT: ICD-10-CM

## 2023-01-23 DIAGNOSIS — G89.3 CANCER ASSOCIATED PAIN: ICD-10-CM

## 2023-01-23 DIAGNOSIS — R63.4 WEIGHT LOSS: ICD-10-CM

## 2023-01-23 DIAGNOSIS — D69.6 THROMBOCYTOPENIA, UNSPECIFIED (HCC): ICD-10-CM

## 2023-01-23 DIAGNOSIS — C83.33 DIFFUSE LARGE B-CELL LYMPHOMA OF INTRA-ABDOMINAL LYMPH NODES (HCC): Primary | ICD-10-CM

## 2023-01-23 LAB
ABSOLUTE EOS #: 0.18 K/UL (ref 0–0.44)
ABSOLUTE IMMATURE GRANULOCYTE: 0.01 K/UL (ref 0–0.3)
ABSOLUTE LYMPH #: 0.79 K/UL (ref 1.1–3.7)
ABSOLUTE MONO #: 0.47 K/UL (ref 0.1–1.2)
ALBUMIN SERPL-MCNC: 3.8 G/DL (ref 3.5–5.2)
ALP BLD-CCNC: 92 U/L (ref 35–104)
ALT SERPL-CCNC: 14 U/L (ref 5–33)
ANION GAP SERPL CALCULATED.3IONS-SCNC: 10 MMOL/L (ref 9–17)
AST SERPL-CCNC: 17 U/L
BASOPHILS # BLD: 0 % (ref 0–2)
BASOPHILS ABSOLUTE: <0.03 K/UL (ref 0–0.2)
BILIRUB SERPL-MCNC: 0.4 MG/DL (ref 0.3–1.2)
BUN BLDV-MCNC: 14 MG/DL (ref 8–23)
BUN/CREAT BLD: 29 (ref 9–20)
CALCIUM SERPL-MCNC: 9.5 MG/DL (ref 8.6–10.4)
CHLORIDE BLD-SCNC: 104 MMOL/L (ref 98–107)
CO2: 25 MMOL/L (ref 20–31)
CREAT SERPL-MCNC: 0.48 MG/DL (ref 0.5–0.9)
EOSINOPHILS RELATIVE PERCENT: 6 % (ref 1–4)
GFR SERPL CREATININE-BSD FRML MDRD: >60 ML/MIN/1.73M2
GLUCOSE BLD-MCNC: 90 MG/DL (ref 70–99)
HCT VFR BLD CALC: 39.2 % (ref 36.3–47.1)
HEMOGLOBIN: 12.5 G/DL (ref 11.9–15.1)
IMMATURE GRANULOCYTES: 0 %
LACTATE DEHYDROGENASE: 139 U/L (ref 135–214)
LYMPHOCYTES # BLD: 25 % (ref 24–43)
MCH RBC QN AUTO: 32.2 PG (ref 25.2–33.5)
MCHC RBC AUTO-ENTMCNC: 31.9 G/DL (ref 28.4–34.8)
MCV RBC AUTO: 101 FL (ref 82.6–102.9)
MONOCYTES # BLD: 15 % (ref 3–12)
PDW BLD-RTO: 13.1 % (ref 11.8–14.4)
PLATELET # BLD: 167 K/UL (ref 138–453)
PMV BLD AUTO: 9.7 FL (ref 8.1–13.5)
POTASSIUM SERPL-SCNC: 4.1 MMOL/L (ref 3.7–5.3)
RBC # BLD: 3.88 M/UL (ref 3.95–5.11)
SEG NEUTROPHILS: 55 % (ref 36–65)
SEGMENTED NEUTROPHILS ABSOLUTE COUNT: 1.77 K/UL (ref 1.5–8.1)
SODIUM BLD-SCNC: 139 MMOL/L (ref 135–144)
TOTAL PROTEIN: 6.8 G/DL (ref 6.4–8.3)
WBC # BLD: 3.2 K/UL (ref 3.5–11.3)

## 2023-01-23 PROCEDURE — G8427 DOCREV CUR MEDS BY ELIG CLIN: HCPCS | Performed by: INTERNAL MEDICINE

## 2023-01-23 PROCEDURE — 99214 OFFICE O/P EST MOD 30 MIN: CPT | Performed by: INTERNAL MEDICINE

## 2023-01-23 PROCEDURE — G8399 PT W/DXA RESULTS DOCUMENT: HCPCS | Performed by: INTERNAL MEDICINE

## 2023-01-23 PROCEDURE — 36591 DRAW BLOOD OFF VENOUS DEVICE: CPT

## 2023-01-23 PROCEDURE — 6360000002 HC RX W HCPCS: Performed by: INTERNAL MEDICINE

## 2023-01-23 PROCEDURE — 2580000003 HC RX 258: Performed by: INTERNAL MEDICINE

## 2023-01-23 PROCEDURE — 1036F TOBACCO NON-USER: CPT | Performed by: INTERNAL MEDICINE

## 2023-01-23 PROCEDURE — 3017F COLORECTAL CA SCREEN DOC REV: CPT | Performed by: INTERNAL MEDICINE

## 2023-01-23 PROCEDURE — 85025 COMPLETE CBC W/AUTO DIFF WBC: CPT

## 2023-01-23 PROCEDURE — 1123F ACP DISCUSS/DSCN MKR DOCD: CPT | Performed by: INTERNAL MEDICINE

## 2023-01-23 PROCEDURE — 83615 LACTATE (LD) (LDH) ENZYME: CPT

## 2023-01-23 PROCEDURE — G8484 FLU IMMUNIZE NO ADMIN: HCPCS | Performed by: INTERNAL MEDICINE

## 2023-01-23 PROCEDURE — 80053 COMPREHEN METABOLIC PANEL: CPT

## 2023-01-23 PROCEDURE — G8417 CALC BMI ABV UP PARAM F/U: HCPCS | Performed by: INTERNAL MEDICINE

## 2023-01-23 PROCEDURE — 1090F PRES/ABSN URINE INCON ASSESS: CPT | Performed by: INTERNAL MEDICINE

## 2023-01-23 RX ORDER — MELATONIN
1000 DAILY
Qty: 90 TABLET | Refills: 1 | Status: SHIPPED | OUTPATIENT
Start: 2023-01-23

## 2023-01-23 RX ORDER — SODIUM CHLORIDE 0.9 % (FLUSH) 0.9 %
5-40 SYRINGE (ML) INJECTION PRN
OUTPATIENT
Start: 2023-01-23

## 2023-01-23 RX ORDER — OMEPRAZOLE 20 MG/1
CAPSULE, DELAYED RELEASE ORAL
Qty: 60 CAPSULE | Refills: 2 | Status: SHIPPED | OUTPATIENT
Start: 2023-01-23

## 2023-01-23 RX ORDER — HYDROCODONE BITARTRATE AND ACETAMINOPHEN 5; 325 MG/1; MG/1
1 TABLET ORAL EVERY 8 HOURS PRN
Qty: 90 TABLET | Refills: 0 | Status: SHIPPED | OUTPATIENT
Start: 2023-01-23 | End: 2023-02-22

## 2023-01-23 RX ORDER — HEPARIN SODIUM (PORCINE) LOCK FLUSH IV SOLN 100 UNIT/ML 100 UNIT/ML
500 SOLUTION INTRAVENOUS PRN
OUTPATIENT
Start: 2023-01-23

## 2023-01-23 RX ORDER — SODIUM CHLORIDE 9 MG/ML
25 INJECTION, SOLUTION INTRAVENOUS PRN
OUTPATIENT
Start: 2023-01-23

## 2023-01-23 RX ORDER — HEPARIN SODIUM (PORCINE) LOCK FLUSH IV SOLN 100 UNIT/ML 100 UNIT/ML
500 SOLUTION INTRAVENOUS PRN
Status: DISCONTINUED | OUTPATIENT
Start: 2023-01-23 | End: 2023-01-24 | Stop reason: HOSPADM

## 2023-01-23 RX ORDER — ONDANSETRON 8 MG/1
8 TABLET, ORALLY DISINTEGRATING ORAL EVERY 8 HOURS PRN
Qty: 30 TABLET | Refills: 3 | Status: SHIPPED | OUTPATIENT
Start: 2023-01-23

## 2023-01-23 RX ORDER — SODIUM CHLORIDE 0.9 % (FLUSH) 0.9 %
5-40 SYRINGE (ML) INJECTION PRN
Status: DISCONTINUED | OUTPATIENT
Start: 2023-01-23 | End: 2023-01-24 | Stop reason: HOSPADM

## 2023-01-23 RX ADMIN — Medication 500 UNITS: at 11:03

## 2023-01-23 RX ADMIN — SODIUM CHLORIDE, PRESERVATIVE FREE 10 ML: 5 INJECTION INTRAVENOUS at 11:03
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
## 2023-01-23 NOTE — PROGRESS NOTES
Patient arrive ambulatory for port draw and MD visit. Patient denies complaint or concern. Port accessed; specimen sent. Labs reviewed. MD in room, ok to discharge patient. Port flushed and heparinized with intact horne needle removed per protocol.   Patient discharged with instructions to stop up front for AVS.

## 2023-01-23 NOTE — TELEPHONE ENCOUNTER
Edgar Daniels MD VISIT & TX  Rv in 3 months with labs   MD VISIT 4/17/23 @ 10:15AM [address] @ 9:30AM  SCRIPTS SENT TO PT PHARMACY  AVS PRINTED W/ INSTRUCTIONS AND GIVEN TO PT ON EXIT

## 2023-02-13 DIAGNOSIS — E78.2 MIXED HYPERLIPIDEMIA: ICD-10-CM

## 2023-02-13 RX ORDER — ATORVASTATIN CALCIUM 20 MG/1
TABLET, FILM COATED ORAL
Qty: 90 TABLET | Refills: 3 | Status: SHIPPED | OUTPATIENT
Start: 2023-02-13

## 2023-02-13 NOTE — TELEPHONE ENCOUNTER
Please Approve or Refuse.   Send to Pharmacy per Pt's Request:      Next Visit Date:  2/28/2023   Last Visit Date: 12/16/2022    No results found for: LABA1C          ( goal A1C is < 7)   BP Readings from Last 3 Encounters:   01/23/23 139/77   12/09/22 135/68   10/31/22 116/74          (goal 120/80)  BUN   Date Value Ref Range Status   01/23/2023 14 8 - 23 mg/dL Final     Creatinine   Date Value Ref Range Status   01/23/2023 0.48 (L) 0.50 - 0.90 mg/dL Final     Potassium   Date Value Ref Range Status   01/23/2023 4.1 3.7 - 5.3 mmol/L Final

## 2023-02-27 ENCOUNTER — HOSPITAL ENCOUNTER (OUTPATIENT)
Age: 73
Setting detail: SPECIMEN
Discharge: HOME | End: 2023-02-27
Payer: MEDICARE

## 2023-02-27 DIAGNOSIS — E78.2 MIXED HYPERLIPIDEMIA: ICD-10-CM

## 2023-02-27 LAB
CHOLEST SERPL-MCNC: 197 MG/DL
CHOLESTEROL/HDL RATIO: 3
HDLC SERPL-MCNC: 66 MG/DL
LDLC SERPL CALC-MCNC: 115 MG/DL (ref 0–130)
TRIGL SERPL-MCNC: 80 MG/DL

## 2023-02-27 PROCEDURE — 36415 COLL VENOUS BLD VENIPUNCTURE: CPT

## 2023-02-27 PROCEDURE — 80061 LIPID PANEL: CPT

## 2023-02-28 ENCOUNTER — OFFICE VISIT (OUTPATIENT)
Dept: FAMILY MEDICINE CLINIC | Age: 73
End: 2023-02-28
Payer: MEDICARE

## 2023-02-28 VITALS
TEMPERATURE: 98.3 F | OXYGEN SATURATION: 99 % | WEIGHT: 157 LBS | DIASTOLIC BLOOD PRESSURE: 70 MMHG | BODY MASS INDEX: 28.89 KG/M2 | HEIGHT: 62 IN | HEART RATE: 81 BPM | SYSTOLIC BLOOD PRESSURE: 110 MMHG

## 2023-02-28 DIAGNOSIS — E78.2 MIXED HYPERLIPIDEMIA: Primary | ICD-10-CM

## 2023-02-28 DIAGNOSIS — E44.0 MODERATE MALNUTRITION (HCC): ICD-10-CM

## 2023-02-28 DIAGNOSIS — C83.33 DIFFUSE LARGE B-CELL LYMPHOMA OF INTRA-ABDOMINAL LYMPH NODES (HCC): ICD-10-CM

## 2023-02-28 DIAGNOSIS — I72.8 SPLENIC ARTERY ANEURYSM (HCC): ICD-10-CM

## 2023-02-28 DIAGNOSIS — D69.6 THROMBOCYTOPENIA, UNSPECIFIED (HCC): ICD-10-CM

## 2023-02-28 DIAGNOSIS — K80.20 CALCULUS OF GALLBLADDER WITHOUT CHOLECYSTITIS WITHOUT OBSTRUCTION: ICD-10-CM

## 2023-02-28 PROBLEM — N18.30 CHRONIC RENAL DISEASE, STAGE III (HCC): Status: RESOLVED | Noted: 2022-04-27 | Resolved: 2023-02-28

## 2023-02-28 PROBLEM — E66.9 OBESITY, CLASS II, BMI 35-39.9, NO COMORBIDITY: Status: RESOLVED | Noted: 2019-01-16 | Resolved: 2023-02-28

## 2023-02-28 PROBLEM — E66.812 OBESITY, CLASS II, BMI 35-39.9, NO COMORBIDITY: Status: RESOLVED | Noted: 2019-01-16 | Resolved: 2023-02-28

## 2023-02-28 PROBLEM — R19.00 ABDOMINAL MASS: Status: RESOLVED | Noted: 2022-03-30 | Resolved: 2023-02-28

## 2023-02-28 PROCEDURE — G8399 PT W/DXA RESULTS DOCUMENT: HCPCS | Performed by: FAMILY MEDICINE

## 2023-02-28 PROCEDURE — 1123F ACP DISCUSS/DSCN MKR DOCD: CPT | Performed by: FAMILY MEDICINE

## 2023-02-28 PROCEDURE — G8427 DOCREV CUR MEDS BY ELIG CLIN: HCPCS | Performed by: FAMILY MEDICINE

## 2023-02-28 PROCEDURE — 3017F COLORECTAL CA SCREEN DOC REV: CPT | Performed by: FAMILY MEDICINE

## 2023-02-28 PROCEDURE — G8417 CALC BMI ABV UP PARAM F/U: HCPCS | Performed by: FAMILY MEDICINE

## 2023-02-28 PROCEDURE — 1090F PRES/ABSN URINE INCON ASSESS: CPT | Performed by: FAMILY MEDICINE

## 2023-02-28 PROCEDURE — 1036F TOBACCO NON-USER: CPT | Performed by: FAMILY MEDICINE

## 2023-02-28 PROCEDURE — 99214 OFFICE O/P EST MOD 30 MIN: CPT | Performed by: FAMILY MEDICINE

## 2023-02-28 PROCEDURE — G8484 FLU IMMUNIZE NO ADMIN: HCPCS | Performed by: FAMILY MEDICINE

## 2023-02-28 RX ORDER — HYDROCODONE BITARTRATE AND ACETAMINOPHEN 5; 325 MG/1; MG/1
1 TABLET ORAL 2 TIMES DAILY PRN
COMMUNITY

## 2023-02-28 SDOH — ECONOMIC STABILITY: FOOD INSECURITY: WITHIN THE PAST 12 MONTHS, THE FOOD YOU BOUGHT JUST DIDN'T LAST AND YOU DIDN'T HAVE MONEY TO GET MORE.: NEVER TRUE

## 2023-02-28 SDOH — ECONOMIC STABILITY: FOOD INSECURITY: WITHIN THE PAST 12 MONTHS, YOU WORRIED THAT YOUR FOOD WOULD RUN OUT BEFORE YOU GOT MONEY TO BUY MORE.: NEVER TRUE

## 2023-02-28 SDOH — ECONOMIC STABILITY: INCOME INSECURITY: HOW HARD IS IT FOR YOU TO PAY FOR THE VERY BASICS LIKE FOOD, HOUSING, MEDICAL CARE, AND HEATING?: NOT HARD AT ALL

## 2023-02-28 ASSESSMENT — ENCOUNTER SYMPTOMS
EYE REDNESS: 0
DIARRHEA: 0
STRIDOR: 0
RHINORRHEA: 0
ABDOMINAL DISTENTION: 0
SORE THROAT: 0
TROUBLE SWALLOWING: 0
VOMITING: 0
WHEEZING: 0
RECTAL PAIN: 0
COLOR CHANGE: 0
BLOOD IN STOOL: 0
CHEST TIGHTNESS: 0
NAUSEA: 0
CONSTIPATION: 0
SINUS PRESSURE: 0
COUGH: 0
SHORTNESS OF BREATH: 0
BACK PAIN: 1
ABDOMINAL PAIN: 0

## 2023-02-28 ASSESSMENT — PATIENT HEALTH QUESTIONNAIRE - PHQ9
1. LITTLE INTEREST OR PLEASURE IN DOING THINGS: 0
SUM OF ALL RESPONSES TO PHQ QUESTIONS 1-9: 0
SUM OF ALL RESPONSES TO PHQ9 QUESTIONS 1 & 2: 0
SUM OF ALL RESPONSES TO PHQ QUESTIONS 1-9: 0
2. FEELING DOWN, DEPRESSED OR HOPELESS: 0
SUM OF ALL RESPONSES TO PHQ QUESTIONS 1-9: 0
SUM OF ALL RESPONSES TO PHQ QUESTIONS 1-9: 0

## 2023-02-28 NOTE — PATIENT INSTRUCTIONS
Thank you for choosing [facility]) as your healthcare provider as your care is important to us. Please arrive at your appointment on time. If you are unable to make your appointment, please call our office as soon as possible so that we may reschedule your appointment. Missing 3 appointments in a calendar year without notifying the office may lead to dismissal from the practice. We appreciate you calling at least 24 hours in advance, when possible. Thank you. New Updates for [facility]    Thank you for choosing [facility] to give you the best care! [facility]) is always trying to think of new ways to help their patients. We are asking all patients to try out the new digital registration that is now available through your [facility] account Via the tsering you're now able to update your personal and registration information prior to your upcoming appointment. This will save you time once you arrive at the office to check-in, not to mention your information remains safe!! Many other perks come from signing up for an account, such as:  Requesting refills  Scheduling an appointment  Completing an E-Visit  Sending a message to the office/provider  Having access to your medication list  Paying your bill/copay prior to your appointment  Scheduling your yearly mammogram  Review your test results    If you are not familiar with [facility] please ask one of us and we will be happy to answer any questions or help you set-up your account.       Your [employer],

## 2023-02-28 NOTE — PROGRESS NOTES
Visit Information    Have you changed or started any medications since your last visit including any over-the-counter medicines, vitamins, or herbal medicines? no   Are you having any side effects from any of your medications? -  no  Have you stopped taking any of your medications? Is so, why? -  no    Have you seen any other physician or provider since your last visit? Yes - Records Obtained  Have you had any other diagnostic tests since your last visit? Yes - Records Obtained  Have you been seen in the emergency room and/or had an admission to a hospital since we last saw you? Yes - Records Obtained  Have you had your routine dental cleaning in the past 6 months? yes     Have you activated your [employer] account? If not, what are your barriers?  Yes     Patient Care Team:  Goldie Mueller MD as PCP - General (Family Medicine)  Goldie Mueller MD as PCP - Empaneled Provider  Myrtle Perla MD as Consulting Physician (Radiation Oncology)    Medical History Review  Past Medical, Family, and Social History reviewed and does contribute to the patient presenting condition    Health Maintenance   Topic Date Due    Shingles vaccine (1 of 2) 04/13/2003    Annual Wellness Visit (AWV)  06/30/2023    Depression Screen  09/29/2023    Colorectal Cancer Screen  10/28/2023    GFR test (Diabetes, CKD 3-4, OR last GFR 15-59)  01/23/2024    Breast cancer screen  02/21/2024    Lipids  02/27/2024    DTaP/Tdap/Td vaccine (3 - Td or Tdap) 02/08/2030    DEXA (modify frequency per FRAX score)  Completed    Flu vaccine  Completed    Pneumococcal 65+ years Vaccine  Completed    COVID-19 Vaccine  Completed    Hepatitis C screen  Completed    Hepatitis A vaccine  Aged Out    Hib vaccine  Aged Out    Meningococcal (ACWY) vaccine  Aged Out

## 2023-02-28 NOTE — PROGRESS NOTES
Chief Complaint   Patient presents with    Follow-up Chronic Condition         Nallley Sharron  here today for follow up on chronic medical problems, go over labs and/or diagnostic studies, and medication refills. Follow-up Chronic Condition      HPI: Patient is scheduled for follow-up on chronic medical conditions. History of hyperlipidemia stable on statins, recent blood work is normal.  Patient denies any side effects from medication. The 10-year CVD risk score (LEISA'Magnus, et al., 2008) is: 5.9%    Values used to calculate the score:      Age: 67 years      Sex: Female      Diabetic: No      Tobacco smoker: No      Systolic Blood Pressure: 650 mmHg      Is BP treated: No      HDL Cholesterol: 66 mg/dL      Total Cholesterol: 197 mg/dL    Patient has history of large B-cell lymphoma, completed chemoradiation therapy. Recent CT chest is showing improvement and stable on lymphoma. Patient has recovered, her recent blood counts has improved. White blood cell count has improved. Patient is currently on Norco as needed basis, provided by oncologist.    CT chest abdomen  There is ill-defined soft tissue density in the   para-aortic retroperitoneum, extending from origin of celiac artery to the   level of EVAN origin. The soft tissue density measures approximately 3.5 cm   in AP dimension and 3.9 cm in transverse dimension just below the origin of   the SMA, which is similar in size to the previous PET/CT dated 09/12/2022,   when measured at the same level, given differences in imaging technique. Size of the mass is significantly decreased when compared to 03/29/2022. Abdominal aorta is normal in caliber. Unchanged 1.3 cm splenic artery   aneurysm. No new retroperitoneal lymphadenopathy or mesenteric     Moderate malnutrition which has improved, patient's weight is stable, she has gained few pounds back. She has not lost any weight in the last few months. Patient's appetite has improved.       Patient has history of splenic aortic aneurysm small incidental finding, which is stable on recent CAT scan. Thrombocytopenia has improved on recent blood work. Cholelithiasis, incidental finding on CAT scan which is stable patient is asymptomatic continue to monitor. /70   Pulse 81   Temp 98.3 °F (36.8 °C)   Ht 5' 2\" (1.575 m)   Wt 157 lb (71.2 kg)   SpO2 99%   BMI 28.72 kg/m²    Body mass index is 28.72 kg/m². Wt Readings from Last 3 Encounters:   02/28/23 157 lb (71.2 kg)   01/23/23 155 lb 12.8 oz (70.7 kg)   12/09/22 156 lb 6.4 oz (70.9 kg)        [x]Negative depression screening. PHQ Scores 2/28/2023 9/29/2022 6/22/2022 4/27/2022 2/15/2022 12/23/2021 12/29/2020   PHQ2 Score 0 0 0 0 0 0 0   PHQ9 Score 0 0 0 0 0 0 0      []1-4 = Minimal depression   []5-9 = Milddepression   []10-14 = Moderate depression   []15-19 = Moderately severe depression   []20-27 = Severe depression    Discussed testing with the patient and all questions fully answered. Hospital Outpatient Visit on 02/27/2023   Component Date Value Ref Range Status    Cholesterol 02/27/2023 197  <200 mg/dL Final    Comment:    Cholesterol Guidelines:      <200  Desirable   200-240  Borderline      >240  Undesirable         HDL 02/27/2023 66  >40 mg/dL Final    Comment:    HDL Guidelines:    <40     Undesirable   40-59    Borderline    >59     Desirable         LDL Cholesterol 02/27/2023 115  0 - 130 mg/dL Final    Comment:    LDL Guidelines:     <100    Desirable   100-129   Near to/above Desirable   130-159   Borderline      >159   Undesirable     Direct (measured) LDL and calculated LDL are not interchangeable tests. Chol/HDL Ratio 02/27/2023 3.0  <5 Final            Triglycerides 02/27/2023 80  <150 mg/dL Final    Comment:    Triglyceride Guidelines:     <150   Desirable   150-199  Borderline   200-499  High     >499   Very high   Based on AHA Guidelines for fasting triglyceride, October 2012.               Most recent labs reviewed:     Lab Results   Component Value Date    WBC 3.2 (L) 01/23/2023    HGB 12.5 01/23/2023    HCT 39.2 01/23/2023    .0 01/23/2023     01/23/2023       @BRIEFLAB(NA,K,CL,CO2,BUN,CREATININE,GLUCOSE,CALCIUM)@     Lab Results   Component Value Date    ALT 14 01/23/2023    AST 17 01/23/2023    ALKPHOS 92 01/23/2023    BILITOT 0.4 01/23/2023       Lab Results   Component Value Date    TSH 2.42 07/20/2020       Lab Results   Component Value Date    CHOL 197 02/27/2023    CHOL 194 07/20/2020    CHOL 270 (H) 01/22/2019     Lab Results   Component Value Date    TRIG 80 02/27/2023    TRIG 75 07/20/2020    TRIG 97 01/22/2019     Lab Results   Component Value Date    HDL 66 02/27/2023    HDL 64 02/17/2022    HDL 64 07/20/2020     Lab Results   Component Value Date    LDLCHOLESTEROL 115 02/27/2023    LDLCHOLESTEROL 186 (H) 02/17/2022    LDLCHOLESTEROL 115 07/20/2020     Lab Results   Component Value Date    VLDL NOT REPORTED 02/17/2022    VLDL NOT REPORTED 07/20/2020    VLDL NOT REPORTED 01/22/2019     Lab Results   Component Value Date    CHOLHDLRATIO 3.0 02/27/2023    CHOLHDLRATIO 4.2 02/17/2022    CHOLHDLRATIO 3.0 07/20/2020       No results found for: LABA1C    No results found for: TNFACGGT38    No results found for: FOLATE    No results found for: IRON, TIBC, FERRITIN    No results found for: VITD25          Current Outpatient Medications   Medication Sig Dispense Refill    Handicap Placard MISC by Does not apply route Expires on 12/27 1 each 0    HYDROcodone-acetaminophen (NORCO) 5-325 MG per tablet Take 1 tablet by mouth 2 times daily as needed for Pain.       atorvastatin (LIPITOR) 20 MG tablet take 1 tablet by mouth once daily 90 tablet 3    vitamin D3 (CHOLECALCIFEROL) 25 MCG (1000 UT) TABS tablet Take 1 tablet by mouth daily 90 tablet 1    ondansetron (ZOFRAN ODT) 8 MG TBDP disintegrating tablet Take 1 tablet by mouth every 8 hours as needed for Nausea or Vomiting 30 tablet 3    omeprazole (PRILOSEC) 20 MG delayed release capsule take 1 capsule by mouth once  a day before meals 60 capsule 2    Multiple Vitamin (MULTI VITAMIN PO) Take by mouth       No current facility-administered medications for this visit. Social History     Socioeconomic History    Marital status: Single     Spouse name: Not on file    Number of children: 3    Years of education: 12    Highest education level: Associate degree: occupational, technical, or vocational program   Occupational History     Employer: UNEMPLOYED   Tobacco Use    Smoking status: Former     Packs/day: 0.25     Years: 50.00     Pack years: 12.50     Types: Cigarettes     Start date: 3/1/1966     Quit date: 2015     Years since quittin.1    Smokeless tobacco: Never    Tobacco comments:     quit  ; age start    Substance and Sexual Activity    Alcohol use: Not Currently     Comment: Have nor drank alcohol for 40 years . Do nor like it . Drug use: No    Sexual activity: Not Currently   Other Topics Concern    Not on file   Social History Narrative    Not on file     Social Determinants of Health     Financial Resource Strain: Low Risk     Difficulty of Paying Living Expenses: Not hard at all   Food Insecurity: No Food Insecurity    Worried About [facility] in the Last Year: Never true    [address] in the Last Year: Never true   Transportation Needs: Unknown    Lack of Transportation (Medical): Not on file    Lack of Transportation (Non-Medical):  No   Physical Activity: Unknown    Days of Exercise per Week: 0 days    Minutes of Exercise per Session: Not on file   Stress: Not on file   Social Connections: Not on file   Intimate Partner Violence: Not on file   Housing Stability: Unknown    Unable to Pay for Housing in the Last Year: Not on file    Number of [place] in the Last Year: Not on file    Unstable Housing in the Last Year: No     Counseling given: Not Answered  Tobacco comments: quit  ; age start Family History   Problem Relation Age of Onset    Heart Attack Mother     Heart Disease Mother     Coronary Art Dis Mother          from complication [facility] age 67    COPD Father         death age 66 pulm disease     High Blood Pressure Sister     Mult Sclerosis Sister     Rheum Arthritis Sister     Cancer Sister 27        Geoblastoma    Diabetes Brother     Cancer Sister 27        leukemia    Lung Cancer Brother     Alcohol Abuse Brother              -rest of complaints with corresponding details per ROS    The patient's past medical, surgical, social, and family history as well as her current medications and allergies were reviewed as documented intoday's encounter. Review of Systems   Constitutional:  Positive for activity change and unexpected weight change. Negative for appetite change, fatigue and fever. HENT:  Negative for congestion, ear pain, postnasal drip, rhinorrhea, sinus pressure, sore throat and trouble swallowing. Eyes:  Negative for redness and visual disturbance. Respiratory:  Negative for cough, chest tightness, shortness of breath, wheezing and stridor. Cardiovascular:  Negative for chest pain, palpitations and leg swelling. Gastrointestinal:  Negative for abdominal distention, abdominal pain, blood in stool, constipation, diarrhea, nausea, rectal pain and vomiting. Endocrine: Negative for polydipsia, polyphagia and polyuria. Genitourinary:  Negative for difficulty urinating, flank pain, frequency and urgency. Musculoskeletal:  Positive for arthralgias, back pain and myalgias. Negative for gait problem and neck pain. Skin:  Negative for color change, rash and wound. Allergic/Immunologic: Negative for food allergies and immunocompromised state. Neurological:  Positive for numbness. Negative for dizziness, speech difficulty, weakness, light-headedness and headaches.    Psychiatric/Behavioral:  Negative for agitation, behavioral problems, decreased concentration, dysphoric mood, hallucinations, sleep disturbance and suicidal ideas. The patient is nervous/anxious.          Physical Exam  Vitals and nursing note reviewed.   Constitutional:       General: She is not in acute distress.     Appearance: Normal appearance. She is well-developed. She is not diaphoretic.   HENT:      Head: Normocephalic and atraumatic.      Nose: Nose normal.   Eyes:      General:         Right eye: No discharge.         Left eye: No discharge.      Extraocular Movements: Extraocular movements intact.      Conjunctiva/sclera: Conjunctivae normal.      Pupils: Pupils are equal, round, and reactive to light.   Neck:      Thyroid: No thyromegaly.   Cardiovascular:      Rate and Rhythm: Normal rate and regular rhythm.      Heart sounds: Normal heart sounds. No murmur heard.  Pulmonary:      Effort: Pulmonary effort is normal. No respiratory distress.      Breath sounds: Normal breath sounds. No wheezing or rhonchi.   Abdominal:      General: Bowel sounds are normal. There is no distension.      Palpations: Abdomen is soft. There is no mass.      Tenderness: There is no abdominal tenderness. There is no right CVA tenderness, left CVA tenderness, guarding or rebound.   Musculoskeletal:         General: No tenderness.      Cervical back: Normal range of motion and neck supple. No rigidity or spasms.      Thoracic back: No spasms. Normal range of motion.      Lumbar back: Spasms present. Decreased range of motion.      Right lower leg: No edema.      Left lower leg: No edema.   Lymphadenopathy:      Cervical: No cervical adenopathy.   Skin:     Coloration: Skin is not jaundiced or pale.      Findings: No bruising, erythema or rash.   Neurological:      General: No focal deficit present.      Mental Status: She is alert and oriented to person, place, and time.      Cranial Nerves: No cranial nerve deficit.      Sensory: No sensory deficit.      Motor: No weakness or tremor.      Coordination:  Coordination normal.      Gait: Gait and tandem walk normal.      Deep Tendon Reflexes: Reflexes are normal and symmetric. Psychiatric:         Attention and Perception: Attention and perception normal. She is attentive. Mood and Affect: Mood is anxious. Mood is not depressed. Affect is not tearful. Speech: She is communicative. Speech is not rapid and pressured, delayed or slurred. Behavior: Behavior normal. Behavior is not agitated or slowed. Thought Content: Thought content normal. Thought content is not paranoid. Judgment: Judgment normal.           ASSESSMENT AND PLAN      1. Mixed hyperlipidemia  Stable on recent blood work continue statins  Continue low-fat diet    2. Diffuse large B-cell lymphoma of intra-abdominal lymph nodes (HCC)  Stable on recent CAT scan. Continue to follow with hematologist and oncologist.  Completed treatment  - Handicap [place]ard [facility] by Does not apply route Expires on 12/27  Dispense: 1 each; Refill: 0    3. Moderate malnutrition ([address]  Improving, patient has gained weight. Continue to monitor. Increase dietary intake    4. Splenic artery aneurysm ([address]  Incidental finding stable on recent CAT scan continue to monitor    5. Thrombocytopenia, unspecified  Improved on recent blood work    6. Calculus of gallbladder without cholecystitis without obstruction  Incidental finding patient is asymptomatic continue to monitor. No orders of the defined types were placed in this encounter.         Medications Discontinued During This Encounter   Medication Reason    neomycin-polymyxin-hydrocortisone 1 % SOLN otic solution Therapy completed    vitamin D (CHOLECALCIFEROL) 1000 UNIT TABS tablet DUPLICATE    gabapentin (NEURONTIN) 300 MG capsule Therapy completed       Nallely received counseling on the following healthy behaviors: nutrition, exercise, and medication adherence  Reviewed prior labs and health maintenance  Continue current medications, diet and exercise. Discussed use, benefit, and side effects of prescribed medications. Barriers to medication compliance addressed. Patient given educational materials - see patient instructions  Was a self-tracking handout given in paper form or via [employer]? Yes    Requested Prescriptions     Signed Prescriptions Disp Refills    Handicap Placard MISC 1 each 0     Sig: by Does not apply route Expires on 12/27       All patient questions answered. Patient voiced understanding. Quality Measures    Body mass index is 28.72 kg/m². Elevated. Weight control planned discussed Healthy diet and regular exercise. BP: 110/70. Blood pressure is Normal. Treatment plan consists of No treatment change needed. Fall Risk 9/29/2022 6/22/2022 12/23/2021 12/29/2020 10/22/2020 9/24/2019 1/16/2019   2 or more falls in past year? no no no no no no no   Fall with injury in past year? no no no no no no yes     The patient does not have a history of falls. I did , complete a risk assessment for falls. A plan of care for falls in-office gait and balance testing performed using The Timed Up and Go Test was negative for increased falls risk- no further intervention is currently indicated, home safety tips provided, No Treatment plan indicated    Lab Results   Component Value Date    LDLCHOLESTEROL 115 02/27/2023    (goal LDL reduction with dx if diabetes is 50% LDL reduction)    PHQ Scores 2/28/2023 9/29/2022 6/22/2022 4/27/2022 2/15/2022 12/23/2021 12/29/2020   PHQ2 Score 0 0 0 0 0 0 0   PHQ9 Score 0 0 0 0 0 0 0     Interpretation of Total Score Depression Severity: 1-4 = Minimal depression, 5-9 = Mild depression, 10-14 = Moderate depression, 15-19 = Moderately severe depression, 20-27 = Severe depression    The patient'spast medical, surgical, social, and family history as well as her   current medications and allergies were reviewed as documented in today's encounter.       Medications, labs, diagnostic studies, consultations andfollow-up as documented in this encounter. Return for keep tsering in june . Patient wasseen with total face to face time of 35 minutes. More than 50% of this visit was counseling and education. Future Appointments   Date Time Provider Aleida Rubioi   4/17/2023  9:30 AM [facility] CHA[facility]  Theresa Mullen   4/17/2023 10:15 AM Ita Dia MD  [facility]     This note was completed by using the assistance of a speech-recognition program. However, inadvertent computerized transcription errors may be present. Althoughevery effort was made to ensure accuracy, no guarantees can be provided that every mistake has been identified and corrected by editing.   Electronically signed by Kathy Gamble MD on 2/28/2023  1:43 PM

## 2023-03-28 ENCOUNTER — E-VISIT (OUTPATIENT)
Dept: FAMILY MEDICINE CLINIC | Age: 73
End: 2023-03-28
Payer: MEDICARE

## 2023-03-28 DIAGNOSIS — B96.89 ACUTE BACTERIAL SINUSITIS: Primary | ICD-10-CM

## 2023-03-28 DIAGNOSIS — J01.90 ACUTE BACTERIAL SINUSITIS: Primary | ICD-10-CM

## 2023-03-28 PROCEDURE — 99423 OL DIG E/M SVC 21+ MIN: CPT | Performed by: FAMILY MEDICINE

## 2023-03-28 RX ORDER — AZITHROMYCIN 250 MG/1
TABLET, FILM COATED ORAL
Qty: 6 TABLET | Refills: 0 | Status: SHIPPED | OUTPATIENT
Start: 2023-03-28 | End: 2023-04-02

## 2023-03-28 RX ORDER — CETIRIZINE HYDROCHLORIDE 10 MG/1
10 TABLET ORAL DAILY
Qty: 30 TABLET | Refills: 0 | Status: SHIPPED | OUTPATIENT
Start: 2023-03-28

## 2023-03-28 RX ORDER — FLUTICASONE PROPIONATE 50 MCG
1 SPRAY, SUSPENSION (ML) NASAL DAILY
Qty: 32 G | Refills: 1 | Status: SHIPPED | OUTPATIENT
Start: 2023-03-28

## 2023-03-28 ASSESSMENT — LIFESTYLE VARIABLES
PACKS_PER_DAY: .25
SMOKING_YEARS: 50
SMOKING_STATUS: NO, I'M A FORMER SMOKER

## 2023-03-28 NOTE — PROGRESS NOTES
Nalelly Mcdermott (1950) initiated an asynchronous digital communication through [employer]. Date of service: 3/28/2023     HPI: per patient's questionnaire    EXAM: not applicable    Diagnoses and all orders for this visit:    1. Acute bacterial sinusitis    - azithromycin (ZITHROMAX) 250 MG tablet; 500 mg orally on day one followed by 250 mg daily on days two through five  Dispense: 6 tablet; Refill: 0  - fluticasone (FLONASE) 50 MCG/ACT nasal spray; 1 spray by Each Nostril route daily  Dispense: 32 g; Refill: 1  - cetirizine (ZYRTEC ALLERGY) 10 MG tablet; Take 1 tablet by mouth daily  Dispense: 30 tablet; Refill: 0    No orders of the defined types were placed in this encounter. Patient was advised to contact PCP if symptoms worsen or failing to change as expected      Time: EV3 - 21 or more minutes were spent on the digital evaluation and management of this patient.     Electronically signed by Mary Rogers MD on 3/28/23 at 7:38 AM.

## 2023-04-13 ENCOUNTER — HOSPITAL ENCOUNTER (OUTPATIENT)
Facility: MEDICAL CENTER | Age: 73
End: 2023-04-13
Payer: MEDICARE

## 2023-04-17 ENCOUNTER — TELEPHONE (OUTPATIENT)
Dept: ONCOLOGY | Age: 73
End: 2023-04-17

## 2023-04-17 ENCOUNTER — HOSPITAL ENCOUNTER (OUTPATIENT)
Dept: INFUSION THERAPY | Facility: MEDICAL CENTER | Age: 73
Discharge: HOME | End: 2023-04-17
Payer: MEDICARE

## 2023-04-17 ENCOUNTER — OFFICE VISIT (OUTPATIENT)
Dept: ONCOLOGY | Age: 73
End: 2023-04-17
Payer: MEDICARE

## 2023-04-17 VITALS
BODY MASS INDEX: 30.12 KG/M2 | WEIGHT: 164.7 LBS | HEART RATE: 68 BPM | TEMPERATURE: 98 F | DIASTOLIC BLOOD PRESSURE: 77 MMHG | SYSTOLIC BLOOD PRESSURE: 121 MMHG

## 2023-04-17 DIAGNOSIS — G89.3 CANCER ASSOCIATED PAIN: ICD-10-CM

## 2023-04-17 DIAGNOSIS — C83.33 DIFFUSE LARGE B-CELL LYMPHOMA OF INTRA-ABDOMINAL LYMPH NODES (HCC): ICD-10-CM

## 2023-04-17 DIAGNOSIS — D69.6 THROMBOCYTOPENIA, UNSPECIFIED (HCC): ICD-10-CM

## 2023-04-17 DIAGNOSIS — C83.33 DIFFUSE LARGE B-CELL LYMPHOMA OF INTRA-ABDOMINAL LYMPH NODES (HCC): Primary | ICD-10-CM

## 2023-04-17 DIAGNOSIS — Z51.11 ENCOUNTER FOR CHEMOTHERAPY MANAGEMENT: ICD-10-CM

## 2023-04-17 LAB
ABSOLUTE EOS #: 0.16 K/UL (ref 0–0.44)
ABSOLUTE IMMATURE GRANULOCYTE: 0 K/UL (ref 0–0.3)
ABSOLUTE LYMPH #: 0.81 K/UL (ref 1.1–3.7)
ABSOLUTE MONO #: 0.43 K/UL (ref 0.1–1.2)
ALBUMIN SERPL-MCNC: 4.1 G/DL (ref 3.5–5.2)
ALP SERPL-CCNC: 99 U/L (ref 35–104)
ALT SERPL-CCNC: 15 U/L (ref 5–33)
ANION GAP SERPL CALCULATED.3IONS-SCNC: 10 MMOL/L (ref 9–17)
AST SERPL-CCNC: 18 U/L
BASOPHILS # BLD: 0 % (ref 0–2)
BASOPHILS ABSOLUTE: <0.03 K/UL (ref 0–0.2)
BILIRUB SERPL-MCNC: 0.5 MG/DL (ref 0.3–1.2)
BUN SERPL-MCNC: 13 MG/DL (ref 8–23)
BUN/CREAT BLD: 25 (ref 9–20)
CALCIUM SERPL-MCNC: 9.3 MG/DL (ref 8.6–10.4)
CHLORIDE SERPL-SCNC: 106 MMOL/L (ref 98–107)
CO2 SERPL-SCNC: 26 MMOL/L (ref 20–31)
CREAT SERPL-MCNC: 0.52 MG/DL (ref 0.5–0.9)
EOSINOPHILS RELATIVE PERCENT: 4 % (ref 1–4)
GFR SERPL CREATININE-BSD FRML MDRD: >60 ML/MIN/1.73M2
GLUCOSE SERPL-MCNC: 91 MG/DL (ref 70–99)
HCT VFR BLD AUTO: 39.2 % (ref 36.3–47.1)
HGB BLD-MCNC: 12.6 G/DL (ref 11.9–15.1)
IMMATURE GRANULOCYTES: 0 %
LDLC SERPL-MCNC: 169 U/L (ref 135–214)
LYMPHOCYTES # BLD: 19 % (ref 24–43)
MCH RBC QN AUTO: 32 PG (ref 25.2–33.5)
MCHC RBC AUTO-ENTMCNC: 32.1 G/DL (ref 28.4–34.8)
MCV RBC AUTO: 99.5 FL (ref 82.6–102.9)
MONOCYTES # BLD: 10 % (ref 3–12)
NRBC AUTOMATED: 0 PER 100 WBC
PDW BLD-RTO: 12.6 % (ref 11.8–14.4)
PLATELET # BLD AUTO: 132 K/UL (ref 138–453)
PMV BLD AUTO: 9.9 FL (ref 8.1–13.5)
POTASSIUM SERPL-SCNC: 3.9 MMOL/L (ref 3.7–5.3)
PROT SERPL-MCNC: 6.7 G/DL (ref 6.4–8.3)
RBC # BLD: 3.94 M/UL (ref 3.95–5.11)
SEG NEUTROPHILS: 67 % (ref 36–65)
SEGMENTED NEUTROPHILS ABSOLUTE COUNT: 2.81 K/UL (ref 1.5–8.1)
SODIUM SERPL-SCNC: 142 MMOL/L (ref 135–144)
WBC # BLD AUTO: 4.2 K/UL (ref 3.5–11.3)

## 2023-04-17 PROCEDURE — 3017F COLORECTAL CA SCREEN DOC REV: CPT | Performed by: INTERNAL MEDICINE

## 2023-04-17 PROCEDURE — G8399 PT W/DXA RESULTS DOCUMENT: HCPCS | Performed by: INTERNAL MEDICINE

## 2023-04-17 PROCEDURE — 1123F ACP DISCUSS/DSCN MKR DOCD: CPT | Performed by: INTERNAL MEDICINE

## 2023-04-17 PROCEDURE — 6360000002 HC RX W HCPCS: Performed by: INTERNAL MEDICINE

## 2023-04-17 PROCEDURE — G8428 CUR MEDS NOT DOCUMENT: HCPCS | Performed by: INTERNAL MEDICINE

## 2023-04-17 PROCEDURE — 36591 DRAW BLOOD OFF VENOUS DEVICE: CPT

## 2023-04-17 PROCEDURE — 99211 OFF/OP EST MAY X REQ PHY/QHP: CPT | Performed by: INTERNAL MEDICINE

## 2023-04-17 PROCEDURE — 83615 LACTATE (LD) (LDH) ENZYME: CPT

## 2023-04-17 PROCEDURE — 1036F TOBACCO NON-USER: CPT | Performed by: INTERNAL MEDICINE

## 2023-04-17 PROCEDURE — G8417 CALC BMI ABV UP PARAM F/U: HCPCS | Performed by: INTERNAL MEDICINE

## 2023-04-17 PROCEDURE — 80053 COMPREHEN METABOLIC PANEL: CPT

## 2023-04-17 PROCEDURE — 85025 COMPLETE CBC W/AUTO DIFF WBC: CPT

## 2023-04-17 PROCEDURE — 1090F PRES/ABSN URINE INCON ASSESS: CPT | Performed by: INTERNAL MEDICINE

## 2023-04-17 PROCEDURE — 2580000003 HC RX 258: Performed by: INTERNAL MEDICINE

## 2023-04-17 PROCEDURE — 99214 OFFICE O/P EST MOD 30 MIN: CPT | Performed by: INTERNAL MEDICINE

## 2023-04-17 RX ORDER — HEPARIN SODIUM (PORCINE) LOCK FLUSH IV SOLN 100 UNIT/ML 100 UNIT/ML
500 SOLUTION INTRAVENOUS PRN
Status: DISPENSED | OUTPATIENT
Start: 2023-04-17 | End: 2023-04-17

## 2023-04-17 RX ORDER — SODIUM CHLORIDE 0.9 % (FLUSH) 0.9 %
5-40 SYRINGE (ML) INJECTION PRN
Status: ACTIVE | OUTPATIENT
Start: 2023-04-17 | End: 2023-04-17

## 2023-04-17 RX ADMIN — SODIUM CHLORIDE, PRESERVATIVE FREE 10 ML: 5 INJECTION INTRAVENOUS at 09:31

## 2023-04-17 RX ADMIN — SODIUM CHLORIDE, PRESERVATIVE FREE 10 ML: 5 INJECTION INTRAVENOUS at 10:36

## 2023-04-17 RX ADMIN — HEPARIN 500 UNITS: 100 SYRINGE at 10:36

## 2023-04-17 NOTE — TELEPHONE ENCOUNTER
Bebe Ramirez MD VISIT & TX  Rv in 3 months with labs abd scans just prio   CT C/A/P IS ON 6/26/23 @ 10AM AT [facility] ARRIVAL @ 9AM  MD VISIT 7/3/23 @ 10AM TX @ 9:30AM  AVS PRINTED W/ INSTRUCTIONS AND GIVEN TO PT ON EXIT

## 2023-04-17 NOTE — PROGRESS NOTES
Patient arrived ambulatory per self for port draw and physician visit. Patient denies complaints or concerns. Port accessed;specimen sent. Labs reviewed. Physician at bedside. Port flushed and heparinized with intact horne needle removed per protocol. Patient ambulated off unit per self at discharge.  Instructed to stop at  for AVS.

## 2023-06-27 ENCOUNTER — HOSPITAL ENCOUNTER (OUTPATIENT)
Dept: CT IMAGING | Age: 73
Discharge: HOME | End: 2023-06-29
Attending: INTERNAL MEDICINE
Payer: MEDICARE

## 2023-06-27 DIAGNOSIS — D69.6 THROMBOCYTOPENIA, UNSPECIFIED (HCC): ICD-10-CM

## 2023-06-27 DIAGNOSIS — C83.33 DIFFUSE LARGE B-CELL LYMPHOMA OF INTRA-ABDOMINAL LYMPH NODES (HCC): ICD-10-CM

## 2023-06-27 LAB
EGFR, POC: >60 ML/MIN/1.73M2
POC CREATININE: 0.67 MG/DL (ref 0.51–1.19)

## 2023-06-27 PROCEDURE — 82565 ASSAY OF CREATININE: CPT

## 2023-06-27 PROCEDURE — 74177 CT ABD & PELVIS W/CONTRAST: CPT

## 2023-06-27 PROCEDURE — 2580000003 HC RX 258: Performed by: INTERNAL MEDICINE

## 2023-06-27 PROCEDURE — 6360000004 HC RX CONTRAST MEDICATION: Performed by: INTERNAL MEDICINE

## 2023-06-27 RX ORDER — SODIUM CHLORIDE 0.9 % (FLUSH) 0.9 %
10 SYRINGE (ML) INJECTION PRN
Status: DISCONTINUED | OUTPATIENT
Start: 2023-06-27 | End: 2023-06-30 | Stop reason: HOSPADM

## 2023-06-27 RX ORDER — 0.9 % SODIUM CHLORIDE 0.9 %
100 INTRAVENOUS SOLUTION INTRAVENOUS ONCE
Status: COMPLETED | OUTPATIENT
Start: 2023-06-27 | End: 2023-06-27

## 2023-06-27 RX ADMIN — IOPAMIDOL 100 ML: 755 INJECTION, SOLUTION INTRAVENOUS at 09:48

## 2023-06-27 RX ADMIN — SODIUM CHLORIDE, PRESERVATIVE FREE 10 ML: 5 INJECTION INTRAVENOUS at 09:48

## 2023-06-27 RX ADMIN — SODIUM CHLORIDE 100 ML: 9 INJECTION, SOLUTION INTRAVENOUS at 09:48

## 2023-06-29 ENCOUNTER — HOSPITAL ENCOUNTER (OUTPATIENT)
Facility: MEDICAL CENTER | Age: 73
End: 2023-06-29
Payer: MEDICARE

## 2023-07-03 ENCOUNTER — HOSPITAL ENCOUNTER (OUTPATIENT)
Dept: INFUSION THERAPY | Facility: MEDICAL CENTER | Age: 73
Discharge: HOME | End: 2023-07-03
Payer: MEDICARE

## 2023-07-03 ENCOUNTER — TELEPHONE (OUTPATIENT)
Dept: ONCOLOGY | Age: 73
End: 2023-07-03

## 2023-07-03 ENCOUNTER — OFFICE VISIT (OUTPATIENT)
Dept: ONCOLOGY | Age: 73
End: 2023-07-03
Payer: MEDICARE

## 2023-07-03 VITALS
HEART RATE: 57 BPM | TEMPERATURE: 97.8 F | DIASTOLIC BLOOD PRESSURE: 77 MMHG | SYSTOLIC BLOOD PRESSURE: 169 MMHG | RESPIRATION RATE: 18 BRPM

## 2023-07-03 DIAGNOSIS — C83.33 DIFFUSE LARGE B-CELL LYMPHOMA OF INTRA-ABDOMINAL LYMPH NODES (HCC): Primary | ICD-10-CM

## 2023-07-03 DIAGNOSIS — D69.6 THROMBOCYTOPENIA, UNSPECIFIED (HCC): ICD-10-CM

## 2023-07-03 LAB
ALBUMIN SERPL-MCNC: 3.9 G/DL (ref 3.5–5.2)
ALP SERPL-CCNC: 102 U/L (ref 35–104)
ALT SERPL-CCNC: 27 U/L (ref 5–33)
ANION GAP SERPL CALCULATED.3IONS-SCNC: 9 MMOL/L (ref 9–17)
AST SERPL-CCNC: 26 U/L
BASOPHILS # BLD: <0.03 K/UL (ref 0–0.2)
BASOPHILS NFR BLD: 0 % (ref 0–2)
BILIRUB SERPL-MCNC: 0.4 MG/DL (ref 0.3–1.2)
BUN SERPL-MCNC: 13 MG/DL (ref 8–23)
BUN/CREAT SERPL: 23 (ref 9–20)
CALCIUM SERPL-MCNC: 9.4 MG/DL (ref 8.6–10.4)
CHLORIDE SERPL-SCNC: 104 MMOL/L (ref 98–107)
CO2 SERPL-SCNC: 27 MMOL/L (ref 20–31)
CREAT SERPL-MCNC: 0.56 MG/DL (ref 0.5–0.9)
EOSINOPHIL # BLD: 0.12 K/UL (ref 0–0.44)
EOSINOPHILS RELATIVE PERCENT: 4 % (ref 1–4)
ERYTHROCYTE [DISTWIDTH] IN BLOOD BY AUTOMATED COUNT: 12.2 % (ref 11.8–14.4)
GFR SERPL CREATININE-BSD FRML MDRD: >60 ML/MIN/1.73M2
GLUCOSE SERPL-MCNC: 88 MG/DL (ref 70–99)
HCT VFR BLD AUTO: 39 % (ref 36.3–47.1)
HGB BLD-MCNC: 13 G/DL (ref 11.9–15.1)
IMM GRANULOCYTES # BLD AUTO: 0.02 K/UL (ref 0–0.3)
IMM GRANULOCYTES NFR BLD: 1 %
LDH SERPL-CCNC: 182 U/L (ref 135–214)
LYMPHOCYTES # BLD: 29 % (ref 24–43)
LYMPHOCYTES NFR BLD: 0.92 K/UL (ref 1.1–3.7)
MCH RBC QN AUTO: 32.9 PG (ref 25.2–33.5)
MCHC RBC AUTO-ENTMCNC: 33.3 G/DL (ref 28.4–34.8)
MCV RBC AUTO: 98.7 FL (ref 82.6–102.9)
MONOCYTES NFR BLD: 0.41 K/UL (ref 0.1–1.2)
MONOCYTES NFR BLD: 13 % (ref 3–12)
NEUTROPHILS NFR BLD: 53 % (ref 36–65)
NEUTS SEG NFR BLD: 1.74 K/UL (ref 1.5–8.1)
NRBC BLD-RTO: 0 PER 100 WBC
PLATELET # BLD AUTO: 132 K/UL (ref 138–453)
PMV BLD AUTO: 8.8 FL (ref 8.1–13.5)
POTASSIUM SERPL-SCNC: 4.2 MMOL/L (ref 3.7–5.3)
PROT SERPL-MCNC: 6.7 G/DL (ref 6.4–8.3)
RBC # BLD AUTO: 3.95 M/UL (ref 3.95–5.11)
SODIUM SERPL-SCNC: 140 MMOL/L (ref 135–144)
WBC OTHER # BLD: 3.2 K/UL (ref 3.5–11.3)

## 2023-07-03 PROCEDURE — G8417 CALC BMI ABV UP PARAM F/U: HCPCS | Performed by: INTERNAL MEDICINE

## 2023-07-03 PROCEDURE — 1036F TOBACCO NON-USER: CPT | Performed by: INTERNAL MEDICINE

## 2023-07-03 PROCEDURE — G8399 PT W/DXA RESULTS DOCUMENT: HCPCS | Performed by: INTERNAL MEDICINE

## 2023-07-03 PROCEDURE — 6360000002 HC RX W HCPCS: Performed by: INTERNAL MEDICINE

## 2023-07-03 PROCEDURE — 1090F PRES/ABSN URINE INCON ASSESS: CPT | Performed by: INTERNAL MEDICINE

## 2023-07-03 PROCEDURE — 83615 LACTATE (LD) (LDH) ENZYME: CPT

## 2023-07-03 PROCEDURE — 2580000003 HC RX 258: Performed by: INTERNAL MEDICINE

## 2023-07-03 PROCEDURE — 99214 OFFICE O/P EST MOD 30 MIN: CPT | Performed by: INTERNAL MEDICINE

## 2023-07-03 PROCEDURE — 3017F COLORECTAL CA SCREEN DOC REV: CPT | Performed by: INTERNAL MEDICINE

## 2023-07-03 PROCEDURE — 1123F ACP DISCUSS/DSCN MKR DOCD: CPT | Performed by: INTERNAL MEDICINE

## 2023-07-03 PROCEDURE — G8428 CUR MEDS NOT DOCUMENT: HCPCS | Performed by: INTERNAL MEDICINE

## 2023-07-03 PROCEDURE — 85027 COMPLETE CBC AUTOMATED: CPT

## 2023-07-03 PROCEDURE — 80053 COMPREHEN METABOLIC PANEL: CPT

## 2023-07-03 PROCEDURE — 36591 DRAW BLOOD OFF VENOUS DEVICE: CPT

## 2023-07-03 RX ORDER — HYDROCODONE BITARTRATE AND ACETAMINOPHEN 10; 325 MG/1; MG/1
1 TABLET ORAL EVERY 8 HOURS PRN
Qty: 90 TABLET | Refills: 0 | Status: SHIPPED | OUTPATIENT
Start: 2023-07-03 | End: 2023-08-02

## 2023-07-03 RX ORDER — SODIUM CHLORIDE 0.9 % (FLUSH) 0.9 %
5-40 SYRINGE (ML) INJECTION PRN
OUTPATIENT
Start: 2023-07-03

## 2023-07-03 RX ORDER — SODIUM CHLORIDE 0.9 % (FLUSH) 0.9 %
5-40 SYRINGE (ML) INJECTION PRN
Status: DISCONTINUED | OUTPATIENT
Start: 2023-07-03 | End: 2023-07-04 | Stop reason: HOSPADM

## 2023-07-03 RX ORDER — SODIUM CHLORIDE 9 MG/ML
25 INJECTION, SOLUTION INTRAVENOUS PRN
OUTPATIENT
Start: 2023-07-03

## 2023-07-03 RX ORDER — HEPARIN SODIUM 100 [USP'U]/ML
500 INJECTION, SOLUTION INTRAVENOUS PRN
OUTPATIENT
Start: 2023-07-03

## 2023-07-03 RX ORDER — HEPARIN SODIUM 100 [USP'U]/ML
500 INJECTION, SOLUTION INTRAVENOUS PRN
Status: DISCONTINUED | OUTPATIENT
Start: 2023-07-03 | End: 2023-07-04 | Stop reason: HOSPADM

## 2023-07-03 RX ADMIN — SODIUM CHLORIDE, PRESERVATIVE FREE 10 ML: 5 INJECTION INTRAVENOUS at 11:58

## 2023-07-03 RX ADMIN — HEPARIN 500 UNITS: 100 SYRINGE at 11:58

## 2023-07-03 RX ADMIN — SODIUM CHLORIDE, PRESERVATIVE FREE 30 ML: 5 INJECTION INTRAVENOUS at 11:00
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
## 2023-07-03 NOTE — TELEPHONE ENCOUNTER
[facility] ARRIVES AMBULATORY FOR MD VISIT  DR Javier  INTO SEE PATIENT  ORDERS RECEIVED  RV 3 MONTHS WITH LABS  PORT FLUSH 08/30/23 @10:30AM  RN DRAW CDP CMP [facility] 10/02/23 @9:30AM, MD VISIT @10:15AM  SCRIPT SENT TO PATIENT'S PHARMACY  AVS PRINTED AND GIVEN TO PATIENT WITH INSTRUCTIONS  PATIENT REMAINS IN [facility]

## 2023-07-03 NOTE — PLAN OF CARE
Problem: Safety - Adult  Goal: Free from fall injury  Outcome: Completed     Problem: Safety - Adult  Goal: Free from fall injury  Outcome: Completed

## 2023-07-03 NOTE — PROGRESS NOTES
Pt arrives per amb per self and labs and orders reviewed and pt seen per md and tolerated port draw/flush well and no reactions or complaints and blood return present and pt given avs with next appts from  and pt discharged per amb per self.

## 2023-07-19 DIAGNOSIS — D69.6 THROMBOCYTOPENIA, UNSPECIFIED (HCC): ICD-10-CM

## 2023-07-19 DIAGNOSIS — Z51.11 ENCOUNTER FOR CHEMOTHERAPY MANAGEMENT: ICD-10-CM

## 2023-07-19 DIAGNOSIS — G89.3 CANCER ASSOCIATED PAIN: ICD-10-CM

## 2023-07-19 DIAGNOSIS — C83.33 DIFFUSE LARGE B-CELL LYMPHOMA OF INTRA-ABDOMINAL LYMPH NODES (HCC): ICD-10-CM

## 2023-07-19 RX ORDER — OMEPRAZOLE 20 MG/1
CAPSULE, DELAYED RELEASE ORAL
Qty: 60 CAPSULE | Refills: 2 | Status: SHIPPED | OUTPATIENT
Start: 2023-07-19

## 2023-08-30 ENCOUNTER — HOSPITAL ENCOUNTER (OUTPATIENT)
Dept: INFUSION THERAPY | Facility: MEDICAL CENTER | Age: 73
Discharge: HOME | End: 2023-08-30
Payer: MEDICARE

## 2023-08-30 VITALS
TEMPERATURE: 97.9 F | SYSTOLIC BLOOD PRESSURE: 140 MMHG | DIASTOLIC BLOOD PRESSURE: 81 MMHG | RESPIRATION RATE: 16 BRPM | HEART RATE: 82 BPM

## 2023-08-30 PROCEDURE — 2580000003 HC RX 258: Performed by: INTERNAL MEDICINE

## 2023-08-30 PROCEDURE — 6360000002 HC RX W HCPCS: Performed by: INTERNAL MEDICINE

## 2023-08-30 PROCEDURE — 96523 IRRIG DRUG DELIVERY DEVICE: CPT

## 2023-08-30 RX ORDER — SODIUM CHLORIDE 0.9 % (FLUSH) 0.9 %
5-40 SYRINGE (ML) INJECTION PRN
Status: ACTIVE | OUTPATIENT
Start: 2023-08-30 | End: 2023-08-30

## 2023-08-30 RX ORDER — HEPARIN 100 UNIT/ML
500 SYRINGE INTRAVENOUS PRN
Status: DISPENSED | OUTPATIENT
Start: 2023-08-30 | End: 2023-08-30

## 2023-08-30 RX ADMIN — Medication 500 UNITS: at 10:51

## 2023-08-30 RX ADMIN — SODIUM CHLORIDE, PRESERVATIVE FREE 10 ML: 5 INJECTION INTRAVENOUS at 10:50

## 2023-08-30 NOTE — PROGRESS NOTES
Patient arrived ambulatory per self for port flush. Patient denies complaints or concerns. Port accessed with brisk blood return. Port flushed and heparinized with intact horne needle removed per protocol. Patient ambulated off unit per self at discharge.

## 2023-09-08 SDOH — HEALTH STABILITY: PHYSICAL HEALTH: ON AVERAGE, HOW MANY MINUTES DO YOU ENGAGE IN EXERCISE AT THIS LEVEL?: 20 MIN

## 2023-09-08 SDOH — HEALTH STABILITY: PHYSICAL HEALTH: ON AVERAGE, HOW MANY DAYS PER WEEK DO YOU ENGAGE IN MODERATE TO STRENUOUS EXERCISE (LIKE A BRISK WALK)?: 5 DAYS

## 2023-09-08 ASSESSMENT — LIFESTYLE VARIABLES
HOW OFTEN DO YOU HAVE A DRINK CONTAINING ALCOHOL: 1
HOW MANY STANDARD DRINKS CONTAINING ALCOHOL DO YOU HAVE ON A TYPICAL DAY: 0
HOW MANY STANDARD DRINKS CONTAINING ALCOHOL DO YOU HAVE ON A TYPICAL DAY: PATIENT DOES NOT DRINK
HOW OFTEN DO YOU HAVE A DRINK CONTAINING ALCOHOL: NEVER
HOW OFTEN DO YOU HAVE SIX OR MORE DRINKS ON ONE OCCASION: 1

## 2023-09-11 ENCOUNTER — OFFICE VISIT (OUTPATIENT)
Dept: FAMILY MEDICINE CLINIC | Age: 73
End: 2023-09-11
Payer: MEDICARE

## 2023-09-11 VITALS
SYSTOLIC BLOOD PRESSURE: 110 MMHG | BODY MASS INDEX: 31.28 KG/M2 | HEIGHT: 62 IN | HEART RATE: 87 BPM | WEIGHT: 170 LBS | TEMPERATURE: 97.2 F | DIASTOLIC BLOOD PRESSURE: 70 MMHG | OXYGEN SATURATION: 97 %

## 2023-09-11 DIAGNOSIS — E55.9 VITAMIN D DEFICIENCY: ICD-10-CM

## 2023-09-11 DIAGNOSIS — E78.2 MIXED HYPERLIPIDEMIA: ICD-10-CM

## 2023-09-11 DIAGNOSIS — Z12.31 SCREENING MAMMOGRAM FOR HIGH-RISK PATIENT: ICD-10-CM

## 2023-09-11 DIAGNOSIS — C83.33 DIFFUSE LARGE B-CELL LYMPHOMA OF INTRA-ABDOMINAL LYMPH NODES (HCC): ICD-10-CM

## 2023-09-11 DIAGNOSIS — J40 BRONCHITIS: ICD-10-CM

## 2023-09-11 DIAGNOSIS — Z13.6 SCREENING FOR CARDIOVASCULAR CONDITION: ICD-10-CM

## 2023-09-11 DIAGNOSIS — Z12.11 COLON CANCER SCREENING: ICD-10-CM

## 2023-09-11 DIAGNOSIS — D69.6 THROMBOCYTOPENIA, UNSPECIFIED (HCC): ICD-10-CM

## 2023-09-11 DIAGNOSIS — Z00.00 MEDICARE ANNUAL WELLNESS VISIT, SUBSEQUENT: Primary | ICD-10-CM

## 2023-09-11 PROBLEM — E44.0 MODERATE MALNUTRITION (HCC): Chronic | Status: RESOLVED | Noted: 2022-03-31 | Resolved: 2023-09-11

## 2023-09-11 PROCEDURE — G0439 PPPS, SUBSEQ VISIT: HCPCS | Performed by: FAMILY MEDICINE

## 2023-09-11 PROCEDURE — G0446 INTENS BEHAVE THER CARDIO DX: HCPCS | Performed by: FAMILY MEDICINE

## 2023-09-11 PROCEDURE — 1123F ACP DISCUSS/DSCN MKR DOCD: CPT | Performed by: FAMILY MEDICINE

## 2023-09-11 PROCEDURE — 3017F COLORECTAL CA SCREEN DOC REV: CPT | Performed by: FAMILY MEDICINE

## 2023-09-11 RX ORDER — GUAIFENESIN 600 MG/1
600 TABLET, EXTENDED RELEASE ORAL 2 TIMES DAILY
Qty: 30 TABLET | Refills: 0 | Status: SHIPPED | OUTPATIENT
Start: 2023-09-11

## 2023-09-11 RX ORDER — AZITHROMYCIN 250 MG/1
TABLET, FILM COATED ORAL
Qty: 6 TABLET | Refills: 0 | Status: SHIPPED | OUTPATIENT
Start: 2023-09-11 | End: 2023-09-16

## 2023-09-11 ASSESSMENT — VISUAL ACUITY
OS_CC: 20/50
OD_CC: 20/25

## 2023-09-11 NOTE — PROGRESS NOTES
Medicare Annual Wellness Visit    Misty Kelly is here for Medicare AWV, Cough (White phlegm ), Congestion (White phlegm ), Fever, and Shortness of Breath (Symptoms started Wednesday went to a wedding -)    Assessment & Plan   Medicare annual wellness visit, subsequent  Screening for cardiovascular condition  -     ND Intensive Behavior Counseling for Cardiovascular Diseases, 8-15 minutes []  Screening mammogram for high-risk patient  -     MELLY DIGITAL SCREEN W OR WO CAD BILATERAL; Future  Mixed hyperlipidemia  -     Lipid Panel; Future  Colon cancer screening  -     Fecal DNA Colorectal cancer screening (Cologuard)  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  Recommended screening schedule for the next 5-10 years is provided to the patient in written form: see Patient Instructions/AVS.     Return in about 4 months (around 1/11/2024) for lab work, 15 MIN BURAK, chronic conditions. Subjective   The following acute and/or chronic problems were also addressed today:    Patient is scheduled for Medicare wellness visit. Patient is complaining of mild sore throat, phlegm production , congestion. Patient reports she had a mild fever which has improved. Patient did COVID test which was negative at home. B-cell lymphoma which is in remission. Patient has recent blood counts checked which are stable.
Visit Information    Have you changed or started any medications since your last visit including any over-the-counter medicines, vitamins, or herbal medicines? no   Are you having any side effects from any of your medications? -  no  Have you stopped taking any of your medications? Is so, why? -  no    Have you seen any other physician or provider since your last visit? No  Have you had any other diagnostic tests since your last visit? No  Have you been seen in the emergency room and/or had an admission to a hospital since we last saw you? No  Have you had your routine dental cleaning in the past 6 months? yes     Have you activated your [employer] account? If not, what are your barriers?  Yes     Patient Care Team:  April Basurto MD as PCP - General (Family Medicine)  April Basurto MD as PCP - Empaneled Provider  Lizzette Summers MD as Consulting Physician (Radiation Oncology)    Medical History Review  Past Medical, Family, and Social History reviewed and does contribute to the patient presenting condition    Health Maintenance   Topic Date Due    Shingles vaccine (1 of 2) 04/13/2003    COVID-19 Vaccine (5 - Pfizer risk series) 12/31/2022    Annual Wellness Visit (AWV)  06/30/2023    Flu vaccine (1) 08/01/2023    Colorectal Cancer Screen  10/28/2023    Breast cancer screen  02/21/2024    Lipids  02/27/2024    Depression Screen  09/08/2024    DTaP/Tdap/Td vaccine (3 - Td or Tdap) 02/08/2030    DEXA (modify frequency per FRAX score)  Completed    Pneumococcal 65+ years Vaccine  Completed    Hepatitis C screen  Completed    Hepatitis A vaccine  Aged Out    Hepatitis B vaccine  Aged Out    Hib vaccine  Aged Out    Meningococcal (ACWY) vaccine  Aged Out    GFR test (Diabetes, CKD 3-4, OR last GFR 15-59)  Discontinued
2 seconds or less

## 2023-09-11 NOTE — PATIENT INSTRUCTIONS

## 2023-09-27 ENCOUNTER — HOSPITAL ENCOUNTER (OUTPATIENT)
Facility: MEDICAL CENTER | Age: 73
End: 2023-09-27
Payer: MEDICARE

## 2023-10-02 ENCOUNTER — OFFICE VISIT (OUTPATIENT)
Dept: ONCOLOGY | Age: 73
End: 2023-10-02
Payer: MEDICARE

## 2023-10-02 ENCOUNTER — TELEPHONE (OUTPATIENT)
Dept: ONCOLOGY | Age: 73
End: 2023-10-02

## 2023-10-02 ENCOUNTER — HOSPITAL ENCOUNTER (OUTPATIENT)
Dept: INFUSION THERAPY | Facility: MEDICAL CENTER | Age: 73
Discharge: HOME | End: 2023-10-02
Payer: MEDICARE

## 2023-10-02 VITALS
RESPIRATION RATE: 16 BRPM | SYSTOLIC BLOOD PRESSURE: 144 MMHG | HEART RATE: 74 BPM | DIASTOLIC BLOOD PRESSURE: 77 MMHG | TEMPERATURE: 97.9 F | WEIGHT: 173.8 LBS | BODY MASS INDEX: 31.79 KG/M2

## 2023-10-02 DIAGNOSIS — D69.6 THROMBOCYTOPENIA, UNSPECIFIED (HCC): ICD-10-CM

## 2023-10-02 DIAGNOSIS — Z92.21 HISTORY OF ANTINEOPLASTIC CHEMOTHERAPY: ICD-10-CM

## 2023-10-02 DIAGNOSIS — C83.33 DIFFUSE LARGE B-CELL LYMPHOMA OF INTRA-ABDOMINAL LYMPH NODES (HCC): Primary | ICD-10-CM

## 2023-10-02 DIAGNOSIS — G89.3 CANCER ASSOCIATED PAIN: ICD-10-CM

## 2023-10-02 LAB
ALBUMIN SERPL-MCNC: 3.9 G/DL (ref 3.5–5.2)
ALP SERPL-CCNC: 89 U/L (ref 35–104)
ALT SERPL-CCNC: 17 U/L (ref 5–33)
ANION GAP SERPL CALCULATED.3IONS-SCNC: 9 MMOL/L (ref 9–17)
AST SERPL-CCNC: 20 U/L
BASOPHILS # BLD: <0.03 K/UL (ref 0–0.2)
BASOPHILS NFR BLD: 0 % (ref 0–2)
BILIRUB SERPL-MCNC: 0.4 MG/DL (ref 0.3–1.2)
BUN SERPL-MCNC: 12 MG/DL (ref 8–23)
BUN/CREAT SERPL: 20 (ref 9–20)
CALCIUM SERPL-MCNC: 9.3 MG/DL (ref 8.6–10.4)
CHLORIDE SERPL-SCNC: 106 MMOL/L (ref 98–107)
CO2 SERPL-SCNC: 27 MMOL/L (ref 20–31)
CREAT SERPL-MCNC: 0.6 MG/DL (ref 0.5–0.9)
EOSINOPHIL # BLD: 0.13 K/UL (ref 0–0.44)
EOSINOPHILS RELATIVE PERCENT: 3 % (ref 1–4)
ERYTHROCYTE [DISTWIDTH] IN BLOOD BY AUTOMATED COUNT: 12.7 % (ref 11.8–14.4)
GFR SERPL CREATININE-BSD FRML MDRD: >60 ML/MIN/1.73M2
GLUCOSE SERPL-MCNC: 104 MG/DL (ref 70–99)
HCT VFR BLD AUTO: 40 % (ref 36.3–47.1)
HGB BLD-MCNC: 13.2 G/DL (ref 11.9–15.1)
IMM GRANULOCYTES # BLD AUTO: 0.01 K/UL (ref 0–0.3)
IMM GRANULOCYTES NFR BLD: 0 %
LDH SERPL-CCNC: 179 U/L (ref 135–214)
LYMPHOCYTES NFR BLD: 1.18 K/UL (ref 1.1–3.7)
LYMPHOCYTES RELATIVE PERCENT: 31 % (ref 24–43)
MCH RBC QN AUTO: 32.6 PG (ref 25.2–33.5)
MCHC RBC AUTO-ENTMCNC: 33 G/DL (ref 28.4–34.8)
MCV RBC AUTO: 98.8 FL (ref 82.6–102.9)
MONOCYTES NFR BLD: 0.45 K/UL (ref 0.1–1.2)
MONOCYTES NFR BLD: 12 % (ref 3–12)
NEUTROPHILS NFR BLD: 54 % (ref 36–65)
NEUTS SEG NFR BLD: 2.01 K/UL (ref 1.5–8.1)
NRBC BLD-RTO: 0 PER 100 WBC
PLATELET # BLD AUTO: 148 K/UL (ref 138–453)
PMV BLD AUTO: 9.2 FL (ref 8.1–13.5)
POTASSIUM SERPL-SCNC: 4 MMOL/L (ref 3.7–5.3)
PROT SERPL-MCNC: 6.5 G/DL (ref 6.4–8.3)
RBC # BLD AUTO: 4.05 M/UL (ref 3.95–5.11)
SODIUM SERPL-SCNC: 142 MMOL/L (ref 135–144)
WBC OTHER # BLD: 3.8 K/UL (ref 3.5–11.3)

## 2023-10-02 PROCEDURE — 2580000003 HC RX 258: Performed by: INTERNAL MEDICINE

## 2023-10-02 PROCEDURE — 80053 COMPREHEN METABOLIC PANEL: CPT

## 2023-10-02 PROCEDURE — 99211 OFF/OP EST MAY X REQ PHY/QHP: CPT

## 2023-10-02 PROCEDURE — 1036F TOBACCO NON-USER: CPT | Performed by: INTERNAL MEDICINE

## 2023-10-02 PROCEDURE — G8484 FLU IMMUNIZE NO ADMIN: HCPCS | Performed by: INTERNAL MEDICINE

## 2023-10-02 PROCEDURE — 36591 DRAW BLOOD OFF VENOUS DEVICE: CPT

## 2023-10-02 PROCEDURE — 99214 OFFICE O/P EST MOD 30 MIN: CPT | Performed by: INTERNAL MEDICINE

## 2023-10-02 PROCEDURE — 3017F COLORECTAL CA SCREEN DOC REV: CPT | Performed by: INTERNAL MEDICINE

## 2023-10-02 PROCEDURE — G8417 CALC BMI ABV UP PARAM F/U: HCPCS | Performed by: INTERNAL MEDICINE

## 2023-10-02 PROCEDURE — 1090F PRES/ABSN URINE INCON ASSESS: CPT | Performed by: INTERNAL MEDICINE

## 2023-10-02 PROCEDURE — G8427 DOCREV CUR MEDS BY ELIG CLIN: HCPCS | Performed by: INTERNAL MEDICINE

## 2023-10-02 PROCEDURE — 1123F ACP DISCUSS/DSCN MKR DOCD: CPT | Performed by: INTERNAL MEDICINE

## 2023-10-02 PROCEDURE — 85025 COMPLETE CBC W/AUTO DIFF WBC: CPT

## 2023-10-02 PROCEDURE — G8399 PT W/DXA RESULTS DOCUMENT: HCPCS | Performed by: INTERNAL MEDICINE

## 2023-10-02 PROCEDURE — 6360000002 HC RX W HCPCS: Performed by: INTERNAL MEDICINE

## 2023-10-02 PROCEDURE — 83615 LACTATE (LD) (LDH) ENZYME: CPT

## 2023-10-02 RX ORDER — HEPARIN 100 UNIT/ML
500 SYRINGE INTRAVENOUS PRN
OUTPATIENT
Start: 2023-10-02

## 2023-10-02 RX ORDER — HEPARIN 100 UNIT/ML
500 SYRINGE INTRAVENOUS PRN
Status: DISCONTINUED | OUTPATIENT
Start: 2023-10-02 | End: 2023-10-03 | Stop reason: HOSPADM

## 2023-10-02 RX ORDER — SODIUM CHLORIDE 0.9 % (FLUSH) 0.9 %
5-40 SYRINGE (ML) INJECTION PRN
OUTPATIENT
Start: 2023-10-02

## 2023-10-02 RX ORDER — SODIUM CHLORIDE 9 MG/ML
25 INJECTION, SOLUTION INTRAVENOUS PRN
OUTPATIENT
Start: 2023-10-02

## 2023-10-02 RX ORDER — SODIUM CHLORIDE 0.9 % (FLUSH) 0.9 %
5-40 SYRINGE (ML) INJECTION PRN
Status: DISCONTINUED | OUTPATIENT
Start: 2023-10-02 | End: 2023-10-03 | Stop reason: HOSPADM

## 2023-10-02 RX ADMIN — SODIUM CHLORIDE, PRESERVATIVE FREE 10 ML: 5 INJECTION INTRAVENOUS at 11:05

## 2023-10-02 RX ADMIN — Medication 500 UNITS: at 11:05

## 2023-10-02 RX ADMIN — SODIUM CHLORIDE, PRESERVATIVE FREE 10 ML: 5 INJECTION INTRAVENOUS at 09:58

## 2023-10-02 NOTE — PROGRESS NOTES
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 No bulky mediastinal adenopathy. Central airways are patent. Esophagus is normal course and caliber. Patent nonaneurysmal thoracic aorta. Cardiac size within normal limits without pericardial effusion. Lungs/pleura: Lungs are clear without focal opacification or consolidation. No dominant nodule or mass lesion. No pleural effusion or pleural process. Soft Tissues/Bones: No acute osseous or soft tissue findings. No aggressive osseous lesion. Right chest wall port in place Abdomen/Pelvis: Organs: Liver without suspicious lesion containing a subcentimeter low-attenuation focus segment 2 of a small hepatic cyst gallbladder unremarkable. Pancreas and spleen unremarkable. Adrenals without nodule. Kidneys without suspicious renal lesion and no hydronephrosis. GI/Bowel: No focal thickening or disproportion dilatation of bowel. No inflammatory findings. Sigmoid diverticulosis without acute diverticulitis in the redundant rectosigmoid colon. Pelvis: No suspicious pelvic lesion or bulky pelvic adenopathy/free fluid. Calcified fibroid uterus. Peritoneum/Retroperitoneum: Patent nonaneurysmal abdominal aorta. Decreased soft tissue density within measured similar to prior previously 3.5 x 3.9 cm now measures 3.1 x 2.3 cm adjacent to the SMA origin left lateral margin and left periaortic region. No progressive adenopathy. Bones/Soft Tissues: No acute osseous or soft tissue findings. Chest: No evidence for active disease or metastatic involvement.  Abdomen and pelvis: Decreased soft tissue density periaortic region associated with the

## 2023-10-02 NOTE — PROGRESS NOTES
Patient arrived ambulatory per self for port draw and MD visit. Denies complaint/concern  Port accessed;specimen sent. Labs reviewed. Physician at bedside. MD visit complete. Port flushed and heparinized with intact horne needle removed per protocol, Band-Aid applied to site.   Pt discharged instructed to stop at  for AVS.

## 2023-10-02 NOTE — TELEPHONE ENCOUNTER
[facility] ARRIVES AMBULATORY FOR MD VISIT  DR TAYLOR IN TO SEE PATIENT  ORDERS RECEIVED  RV 3 MONTHS WITH LABS & SCANS PRIOR  PORT FLUSH 11/29/23 @11:30AM  CT C/A/P WITH CONTRAST SCHEDULED WITH  SHE FOR  01/03/24 @10AM  PORT ACCESS & RN DRAW CDP CMP [facility] @9AM (SCHEDULED WITH KAREN)  MD VISIT 01/08/24 @11:15AM  AVS PRINTED AND GIVEN TO PATIENT WITH INSTRUCTIONS  PATIENT DISCHARGED AMBULATORY

## 2023-11-24 ENCOUNTER — CLINICAL DOCUMENTATION (OUTPATIENT)
Facility: HOSPITAL | Age: 73
End: 2023-11-24

## 2023-11-29 ENCOUNTER — HOSPITAL ENCOUNTER (OUTPATIENT)
Dept: INFUSION THERAPY | Facility: MEDICAL CENTER | Age: 73
Discharge: HOME | End: 2023-11-29
Payer: MEDICARE

## 2023-11-29 VITALS
DIASTOLIC BLOOD PRESSURE: 83 MMHG | RESPIRATION RATE: 16 BRPM | TEMPERATURE: 97.8 F | SYSTOLIC BLOOD PRESSURE: 140 MMHG | HEART RATE: 82 BPM

## 2023-11-29 PROCEDURE — 6360000002 HC RX W HCPCS: Performed by: INTERNAL MEDICINE

## 2023-11-29 PROCEDURE — 96523 IRRIG DRUG DELIVERY DEVICE: CPT

## 2023-11-29 PROCEDURE — 2580000003 HC RX 258: Performed by: INTERNAL MEDICINE

## 2023-11-29 RX ORDER — SODIUM CHLORIDE 0.9 % (FLUSH) 0.9 %
5-40 SYRINGE (ML) INJECTION PRN
Status: ACTIVE | OUTPATIENT
Start: 2023-11-29 | End: 2023-11-29

## 2023-11-29 RX ORDER — HEPARIN 100 UNIT/ML
500 SYRINGE INTRAVENOUS PRN
Status: DISPENSED | OUTPATIENT
Start: 2023-11-29 | End: 2023-11-29

## 2023-11-29 RX ADMIN — SODIUM CHLORIDE, PRESERVATIVE FREE 10 ML: 5 INJECTION INTRAVENOUS at 11:50

## 2023-11-29 RX ADMIN — HEPARIN 500 UNITS: 100 SYRINGE at 11:51

## 2023-11-29 RX ADMIN — SODIUM CHLORIDE, PRESERVATIVE FREE 10 ML: 5 INJECTION INTRAVENOUS at 11:51

## 2023-12-14 LAB — NONINV COLON CA DNA+OCC BLD SCRN STL QL: NEGATIVE

## 2024-01-03 ENCOUNTER — HOSPITAL ENCOUNTER (OUTPATIENT)
Dept: INFUSION THERAPY | Age: 74
Setting detail: INFUSION SERIES
Discharge: HOME | End: 2024-01-03
Payer: MEDICARE

## 2024-01-03 ENCOUNTER — HOSPITAL ENCOUNTER (OUTPATIENT)
Dept: CT IMAGING | Age: 74
Discharge: HOME | End: 2024-01-05
Attending: INTERNAL MEDICINE
Payer: MEDICARE

## 2024-01-03 DIAGNOSIS — C83.33 DIFFUSE LARGE B-CELL LYMPHOMA OF INTRA-ABDOMINAL LYMPH NODES (HCC): Primary | ICD-10-CM

## 2024-01-03 DIAGNOSIS — C83.33 DIFFUSE LARGE B-CELL LYMPHOMA OF INTRA-ABDOMINAL LYMPH NODES (HCC): ICD-10-CM

## 2024-01-03 DIAGNOSIS — D69.6 THROMBOCYTOPENIA, UNSPECIFIED (HCC): ICD-10-CM

## 2024-01-03 DIAGNOSIS — G89.3 CANCER ASSOCIATED PAIN: ICD-10-CM

## 2024-01-03 LAB
ALBUMIN SERPL-MCNC: 3.8 G/DL (ref 3.5–5.2)
ALP SERPL-CCNC: 85 U/L (ref 35–104)
ALT SERPL-CCNC: 17 U/L (ref 5–33)
ANION GAP SERPL CALCULATED.3IONS-SCNC: 6 MMOL/L (ref 9–17)
AST SERPL-CCNC: 19 U/L
BASOPHILS # BLD: 0 K/UL (ref 0–0.2)
BASOPHILS NFR BLD: 0 % (ref 0–2)
BILIRUB SERPL-MCNC: 0.4 MG/DL (ref 0.3–1.2)
BUN SERPL-MCNC: 19 MG/DL (ref 8–23)
CALCIUM SERPL-MCNC: 9.1 MG/DL (ref 8.6–10.4)
CHLORIDE SERPL-SCNC: 105 MMOL/L (ref 98–107)
CO2 SERPL-SCNC: 28 MMOL/L (ref 20–31)
CREAT SERPL-MCNC: 0.5 MG/DL (ref 0.5–0.9)
EGFR, POC: ABNORMAL ML/MIN/1.73M2
EOSINOPHIL # BLD: 0.17 K/UL (ref 0–0.4)
EOSINOPHILS RELATIVE PERCENT: 5 % (ref 0–4)
ERYTHROCYTE [DISTWIDTH] IN BLOOD BY AUTOMATED COUNT: 12.9 % (ref 11.5–14.9)
GFR SERPL CREATININE-BSD FRML MDRD: >60 ML/MIN/1.73M2
GLUCOSE SERPL-MCNC: 94 MG/DL (ref 70–99)
HCT VFR BLD AUTO: 38.3 % (ref 36–46)
HGB BLD-MCNC: 13.3 G/DL (ref 12–16)
LDH SERPL-CCNC: 190 U/L (ref 135–214)
LYMPHOCYTES NFR BLD: 1.12 K/UL (ref 1–4.8)
LYMPHOCYTES RELATIVE PERCENT: 34 % (ref 24–44)
MCH RBC QN AUTO: 33.9 PG (ref 26–34)
MCHC RBC AUTO-ENTMCNC: 34.7 G/DL (ref 31–37)
MCV RBC AUTO: 97.8 FL (ref 80–100)
MONOCYTES NFR BLD: 0.4 K/UL (ref 0.1–1.3)
MONOCYTES NFR BLD: 12 % (ref 1–7)
MORPHOLOGY: NORMAL
NEUTROPHILS NFR BLD: 49 % (ref 36–66)
NEUTS SEG NFR BLD: 1.61 K/UL (ref 1.3–9.1)
PLATELET # BLD AUTO: 190 K/UL (ref 150–450)
PMV BLD AUTO: 6.4 FL (ref 6–12)
POC CREATININE: <0.3 MG/DL (ref 0.51–1.19)
POTASSIUM SERPL-SCNC: 4.4 MMOL/L (ref 3.7–5.3)
PROT SERPL-MCNC: 6.7 G/DL (ref 6.4–8.3)
RBC # BLD AUTO: 3.91 M/UL (ref 4–5.2)
SODIUM SERPL-SCNC: 139 MMOL/L (ref 135–144)
WBC OTHER # BLD: 3.3 K/UL (ref 3.5–11)

## 2024-01-03 PROCEDURE — 80053 COMPREHEN METABOLIC PANEL: CPT

## 2024-01-03 PROCEDURE — 96523 IRRIG DRUG DELIVERY DEVICE: CPT

## 2024-01-03 PROCEDURE — 74177 CT ABD & PELVIS W/CONTRAST: CPT

## 2024-01-03 PROCEDURE — 6360000002 HC RX W HCPCS: Performed by: INTERNAL MEDICINE

## 2024-01-03 PROCEDURE — 6360000004 HC RX CONTRAST MEDICATION: Performed by: INTERNAL MEDICINE

## 2024-01-03 PROCEDURE — 71260 CT THORAX DX C+: CPT

## 2024-01-03 PROCEDURE — 36415 COLL VENOUS BLD VENIPUNCTURE: CPT

## 2024-01-03 PROCEDURE — 2580000003 HC RX 258: Performed by: INTERNAL MEDICINE

## 2024-01-03 PROCEDURE — 85025 COMPLETE CBC W/AUTO DIFF WBC: CPT

## 2024-01-03 PROCEDURE — 82565 ASSAY OF CREATININE: CPT

## 2024-01-03 PROCEDURE — 83615 LACTATE (LD) (LDH) ENZYME: CPT

## 2024-01-03 RX ORDER — HEPARIN 100 UNIT/ML
500 SYRINGE INTRAVENOUS PRN
Status: DISCONTINUED | OUTPATIENT
Start: 2024-01-03 | End: 2024-01-04 | Stop reason: HOSPADM

## 2024-01-03 RX ORDER — SODIUM CHLORIDE 0.9 % (FLUSH) 0.9 %
10 SYRINGE (ML) INJECTION PRN
Status: DISCONTINUED | OUTPATIENT
Start: 2024-01-03 | End: 2024-01-06 | Stop reason: HOSPADM

## 2024-01-03 RX ORDER — 0.9 % SODIUM CHLORIDE 0.9 %
100 INTRAVENOUS SOLUTION INTRAVENOUS ONCE
Status: COMPLETED | OUTPATIENT
Start: 2024-01-03 | End: 2024-01-03

## 2024-01-03 RX ADMIN — SODIUM CHLORIDE 100 ML: 9 INJECTION, SOLUTION INTRAVENOUS at 10:15

## 2024-01-03 RX ADMIN — HEPARIN 500 UNITS: 100 SYRINGE at 10:17

## 2024-01-03 RX ADMIN — IOPAMIDOL 100 ML: 755 INJECTION, SOLUTION INTRAVENOUS at 10:15

## 2024-01-03 RX ADMIN — SODIUM CHLORIDE, PRESERVATIVE FREE 10 ML: 5 INJECTION INTRAVENOUS at 10:15

## 2024-01-03 NOTE — PROGRESS NOTES
Pt arrived for port access prior to radiology testing and lab draw.  R chest port accessed without complications.  Labs easily drawn from port.  Pt sent to radiology.  Upon return to [facility], port heparin locked and deaccessed.  Pt tolerated well.  No s/s adverse reaction noted. Pt discharged home, ambulatory per self.

## 2024-01-08 ENCOUNTER — OFFICE VISIT (OUTPATIENT)
Dept: ONCOLOGY | Age: 74
End: 2024-01-08
Payer: MEDICARE

## 2024-01-08 ENCOUNTER — TELEPHONE (OUTPATIENT)
Dept: ONCOLOGY | Age: 74
End: 2024-01-08

## 2024-01-08 VITALS
HEART RATE: 83 BPM | BODY MASS INDEX: 28.64 KG/M2 | TEMPERATURE: 97.6 F | WEIGHT: 156.6 LBS | DIASTOLIC BLOOD PRESSURE: 92 MMHG | SYSTOLIC BLOOD PRESSURE: 165 MMHG | RESPIRATION RATE: 16 BRPM

## 2024-01-08 DIAGNOSIS — G89.3 CANCER ASSOCIATED PAIN: ICD-10-CM

## 2024-01-08 DIAGNOSIS — Z92.21 HISTORY OF ANTINEOPLASTIC CHEMOTHERAPY: ICD-10-CM

## 2024-01-08 DIAGNOSIS — C83.33 DIFFUSE LARGE B-CELL LYMPHOMA OF INTRA-ABDOMINAL LYMPH NODES (HCC): Primary | ICD-10-CM

## 2024-01-08 PROCEDURE — 3017F COLORECTAL CA SCREEN DOC REV: CPT | Performed by: INTERNAL MEDICINE

## 2024-01-08 PROCEDURE — 1036F TOBACCO NON-USER: CPT | Performed by: INTERNAL MEDICINE

## 2024-01-08 PROCEDURE — 1123F ACP DISCUSS/DSCN MKR DOCD: CPT | Performed by: INTERNAL MEDICINE

## 2024-01-08 PROCEDURE — G8399 PT W/DXA RESULTS DOCUMENT: HCPCS | Performed by: INTERNAL MEDICINE

## 2024-01-08 PROCEDURE — G8484 FLU IMMUNIZE NO ADMIN: HCPCS | Performed by: INTERNAL MEDICINE

## 2024-01-08 PROCEDURE — G8417 CALC BMI ABV UP PARAM F/U: HCPCS | Performed by: INTERNAL MEDICINE

## 2024-01-08 PROCEDURE — 99214 OFFICE O/P EST MOD 30 MIN: CPT | Performed by: INTERNAL MEDICINE

## 2024-01-08 PROCEDURE — 99211 OFF/OP EST MAY X REQ PHY/QHP: CPT | Performed by: INTERNAL MEDICINE

## 2024-01-08 PROCEDURE — 1090F PRES/ABSN URINE INCON ASSESS: CPT | Performed by: INTERNAL MEDICINE

## 2024-01-08 PROCEDURE — G8427 DOCREV CUR MEDS BY ELIG CLIN: HCPCS | Performed by: INTERNAL MEDICINE

## 2024-01-08 NOTE — TELEPHONE ENCOUNTER
[facility] HERE FOR MD VISIT  Rv in 3 months with labs   LAB ORDERS GIVEN TO PT ON EXIT  TO BE DONE 4/1/24  MD VISIT 4/8/24 @ 10AM  AVS PRINTED W/ INSTRUCTIONS AND GIVEN  TO PT ON EXIT

## 2024-01-11 ENCOUNTER — OFFICE VISIT (OUTPATIENT)
Dept: FAMILY MEDICINE CLINIC | Age: 74
End: 2024-01-11
Payer: MEDICARE

## 2024-01-11 VITALS
HEART RATE: 74 BPM | DIASTOLIC BLOOD PRESSURE: 86 MMHG | SYSTOLIC BLOOD PRESSURE: 130 MMHG | WEIGHT: 184 LBS | HEIGHT: 62 IN | BODY MASS INDEX: 33.86 KG/M2 | OXYGEN SATURATION: 97 %

## 2024-01-11 DIAGNOSIS — Z23 ENCOUNTER FOR IMMUNIZATION: ICD-10-CM

## 2024-01-11 DIAGNOSIS — Z78.0 POST-MENOPAUSAL: ICD-10-CM

## 2024-01-11 DIAGNOSIS — M81.0 AGE-RELATED OSTEOPOROSIS WITHOUT CURRENT PATHOLOGICAL FRACTURE: ICD-10-CM

## 2024-01-11 DIAGNOSIS — K80.20 CALCULUS OF GALLBLADDER WITHOUT CHOLECYSTITIS WITHOUT OBSTRUCTION: ICD-10-CM

## 2024-01-11 DIAGNOSIS — C83.33 DIFFUSE LARGE B-CELL LYMPHOMA OF INTRA-ABDOMINAL LYMPH NODES (HCC): ICD-10-CM

## 2024-01-11 DIAGNOSIS — E66.01 MORBIDLY OBESE (HCC): ICD-10-CM

## 2024-01-11 DIAGNOSIS — E55.9 VITAMIN D DEFICIENCY: ICD-10-CM

## 2024-01-11 DIAGNOSIS — E78.2 MIXED HYPERLIPIDEMIA: Primary | ICD-10-CM

## 2024-01-11 PROBLEM — D69.6 THROMBOCYTOPENIA, UNSPECIFIED (HCC): Status: RESOLVED | Noted: 2022-10-31 | Resolved: 2024-01-11

## 2024-01-11 PROCEDURE — 90694 VACC AIIV4 NO PRSRV 0.5ML IM: CPT | Performed by: FAMILY MEDICINE

## 2024-01-11 PROCEDURE — G8399 PT W/DXA RESULTS DOCUMENT: HCPCS | Performed by: FAMILY MEDICINE

## 2024-01-11 PROCEDURE — G8417 CALC BMI ABV UP PARAM F/U: HCPCS | Performed by: FAMILY MEDICINE

## 2024-01-11 PROCEDURE — 1123F ACP DISCUSS/DSCN MKR DOCD: CPT | Performed by: FAMILY MEDICINE

## 2024-01-11 PROCEDURE — G8427 DOCREV CUR MEDS BY ELIG CLIN: HCPCS | Performed by: FAMILY MEDICINE

## 2024-01-11 PROCEDURE — 1090F PRES/ABSN URINE INCON ASSESS: CPT | Performed by: FAMILY MEDICINE

## 2024-01-11 PROCEDURE — G0008 ADMIN INFLUENZA VIRUS VAC: HCPCS | Performed by: FAMILY MEDICINE

## 2024-01-11 PROCEDURE — 99214 OFFICE O/P EST MOD 30 MIN: CPT | Performed by: FAMILY MEDICINE

## 2024-01-11 PROCEDURE — 3017F COLORECTAL CA SCREEN DOC REV: CPT | Performed by: FAMILY MEDICINE

## 2024-01-11 PROCEDURE — G8484 FLU IMMUNIZE NO ADMIN: HCPCS | Performed by: FAMILY MEDICINE

## 2024-01-11 PROCEDURE — 1036F TOBACCO NON-USER: CPT | Performed by: FAMILY MEDICINE

## 2024-01-11 RX ORDER — IBANDRONATE SODIUM 150 MG/1
150 TABLET, FILM COATED ORAL
Qty: 3 TABLET | Refills: 3 | Status: SHIPPED | OUTPATIENT
Start: 2024-01-11

## 2024-01-11 SDOH — ECONOMIC STABILITY: INCOME INSECURITY: HOW HARD IS IT FOR YOU TO PAY FOR THE VERY BASICS LIKE FOOD, HOUSING, MEDICAL CARE, AND HEATING?: NOT HARD AT ALL

## 2024-01-11 SDOH — ECONOMIC STABILITY: FOOD INSECURITY: WITHIN THE PAST 12 MONTHS, YOU WORRIED THAT YOUR FOOD WOULD RUN OUT BEFORE YOU GOT MONEY TO BUY MORE.: NEVER TRUE

## 2024-01-11 SDOH — ECONOMIC STABILITY: FOOD INSECURITY: WITHIN THE PAST 12 MONTHS, THE FOOD YOU BOUGHT JUST DIDN'T LAST AND YOU DIDN'T HAVE MONEY TO GET MORE.: NEVER TRUE

## 2024-01-11 ASSESSMENT — ENCOUNTER SYMPTOMS
ABDOMINAL DISTENTION: 0
NAUSEA: 0
SORE THROAT: 0
SHORTNESS OF BREATH: 0
BLOOD IN STOOL: 0
SINUS PRESSURE: 0
STRIDOR: 0
TROUBLE SWALLOWING: 0
RHINORRHEA: 0
ABDOMINAL PAIN: 0
CHEST TIGHTNESS: 0
EYE REDNESS: 0
COUGH: 0
BACK PAIN: 0
CONSTIPATION: 0
RECTAL PAIN: 0
WHEEZING: 0
COLOR CHANGE: 0
VOMITING: 0
DIARRHEA: 0

## 2024-01-11 ASSESSMENT — PATIENT HEALTH QUESTIONNAIRE - PHQ9
SUM OF ALL RESPONSES TO PHQ QUESTIONS 1-9: 0
SUM OF ALL RESPONSES TO PHQ9 QUESTIONS 1 & 2: 0
2. FEELING DOWN, DEPRESSED OR HOPELESS: 0
SUM OF ALL RESPONSES TO PHQ QUESTIONS 1-9: 0
1. LITTLE INTEREST OR PLEASURE IN DOING THINGS: 0
SUM OF ALL RESPONSES TO PHQ QUESTIONS 1-9: 0
SUM OF ALL RESPONSES TO PHQ QUESTIONS 1-9: 0

## 2024-01-11 NOTE — PATIENT INSTRUCTIONS
New Updates for [employer]    Thank you for choosing [facility] to give you the best care! [employer] is always trying to think of new ways to help their patients. We are asking all patients to try out the new digital registration that is now available through your [employer] account Via the tsering you're now able to update your personal and registration information prior to your upcoming appointment. This will save you time once you arrive at the office to check-in, not to mention your information remains safe!! Many other perks come from signing up for an account, such as:  Requesting refills  Scheduling an appointment  Completing an E-Visit  Sending a message to the office/provider  Having access to your medication list  Paying your bill/copay prior to your appointment  Scheduling your yearly mammogram  Review your test results    If you are not familiar with [facility] My[facility] please ask one of us and we will be happy to answer any questions or help you set-up your account.      Your [facility] office,

## 2024-01-11 NOTE — PROGRESS NOTES
Visit Information    Have you changed or started any medications since your last visit including any over-the-counter medicines, vitamins, or herbal medicines? no   Are you having any side effects from any of your medications? -  no  Have you stopped taking any of your medications? Is so, why? -  no    Have you seen any other physician or provider since your last visit? No  Have you had any other diagnostic tests since your last visit? No  Have you been seen in the emergency room and/or had an admission to a hospital since we last saw you? No  Have you had your routine dental cleaning in the past 6 months? yes -    Have you activated your [employer] account? If not, what are your barriers? Yes     Patient Care Team:  Anjum Marcos MD as PCP - General (Family Medicine)  Anjum Marcos MD as PCP - Empaneled Provider  Walter Mann MD as Consulting Physician (Radiation Oncology)    Medical History Review  Past Medical, Family, and Social History reviewed and does contribute to the patient presenting condition    Health Maintenance   Topic Date Due    Shingles vaccine (1 of 2) 04/13/2003    Respiratory Syncytial Virus (RSV) Pregnant or age 60 yrs+ (1 - 1-dose 60+ series) Never done    Flu vaccine (1) 08/01/2023    COVID-19 Vaccine (5 - 2023-24 season) 09/01/2023    Annual Wellness Visit (Medicare Advantage)  01/01/2024    Breast cancer screen  02/21/2024    Lipids  02/27/2024    Depression Screen  09/08/2024    Colorectal Cancer Screen  12/04/2026    DTaP/Tdap/Td vaccine (3 - Td or Tdap) 02/08/2030    DEXA (modify frequency per FRAX score)  Completed    Pneumococcal 65+ years Vaccine  Completed    Hepatitis C screen  Completed    Hepatitis A vaccine  Aged Out    Hepatitis B vaccine  Aged Out    Hib vaccine  Aged Out    Polio vaccine  Aged Out    Meningococcal (ACWY) vaccine  Aged Out    GFR test (Diabetes, CKD 3-4, OR last GFR 15-59)  Discontinued     
LDLCHOLESTEROL 115 07/20/2020     Lab Results   Component Value Date    VLDL NOT REPORTED 02/17/2022    VLDL NOT REPORTED 07/20/2020    VLDL NOT REPORTED 01/22/2019     Lab Results   Component Value Date    CHOLHDLRATIO 3.0 02/27/2023    CHOLHDLRATIO 4.2 02/17/2022    CHOLHDLRATIO 3.0 07/20/2020       No results found for: \"LABA1C\"    No results found for: \"ZULUUWNZ97\"    No results found for: \"FOLATE\"    No results found for: \"IRON\", \"TIBC\", \"FERRITIN\"    No results found for: \"VITD25\"          Current Outpatient Medications   Medication Sig Dispense Refill    Handicap Placard MISC by Does not apply route Expires on 12/31/27 1 each 0    ibandronate (BONIVA) 150 MG tablet Take 1 tablet by mouth every 30 days Take one (1) tablet once per month in the morning with a full glass of water, on an empty stomach, and do not take anything else by mouth or lie down for the next 60 minutes. 3 tablet 3    omeprazole (PRILOSEC) 20 MG delayed release capsule take 1 capsule by mouth once daily BEFORE A MEAL 60 capsule 2    Handicap Placard MISC by Does not apply route Expires on 12/27 1 each 0    HYDROcodone-acetaminophen (NORCO) 5-325 MG per tablet Take 1 tablet by mouth 2 times daily as needed for Pain.      atorvastatin (LIPITOR) 20 MG tablet take 1 tablet by mouth once daily 90 tablet 3    vitamin D3 (CHOLECALCIFEROL) 25 MCG (1000 UT) TABS tablet Take 1 tablet by mouth daily 90 tablet 1    ondansetron (ZOFRAN ODT) 8 MG TBDP disintegrating tablet Take 1 tablet by mouth every 8 hours as needed for Nausea or Vomiting 30 tablet 3    Multiple Vitamin (MULTI VITAMIN PO) Take by mouth       No current facility-administered medications for this visit.             Social History     Socioeconomic History    Marital status: Single     Spouse name: Not on file    Number of children: 3    Years of education: 12    Highest education level: Associate degree: occupational, technical, or vocational program   Occupational History     Employer:

## 2024-01-24 DIAGNOSIS — C83.33 DIFFUSE LARGE B-CELL LYMPHOMA OF INTRA-ABDOMINAL LYMPH NODES (HCC): ICD-10-CM

## 2024-01-24 DIAGNOSIS — G89.3 CANCER ASSOCIATED PAIN: ICD-10-CM

## 2024-01-24 DIAGNOSIS — Z51.11 ENCOUNTER FOR CHEMOTHERAPY MANAGEMENT: ICD-10-CM

## 2024-01-24 DIAGNOSIS — D69.6 THROMBOCYTOPENIA, UNSPECIFIED (HCC): ICD-10-CM

## 2024-01-24 RX ORDER — OMEPRAZOLE 20 MG/1
CAPSULE, DELAYED RELEASE ORAL
Qty: 60 CAPSULE | Refills: 2 | Status: SHIPPED | OUTPATIENT
Start: 2024-01-24

## 2024-01-29 ENCOUNTER — HOSPITAL ENCOUNTER (OUTPATIENT)
Dept: WOMENS IMAGING | Age: 74
Discharge: HOME | End: 2024-01-31
Payer: MEDICARE

## 2024-01-29 DIAGNOSIS — Z78.0 POST-MENOPAUSAL: ICD-10-CM

## 2024-01-29 PROCEDURE — 77080 DXA BONE DENSITY AXIAL: CPT

## 2024-02-08 NOTE — TELEPHONE ENCOUNTER
Name: Derrick Acosta  : 1950  MRN: [id number]    Oncology Navigation Follow-Up Note    Contact Type:  Telephone    Notes: Attempted to contact pt for ONN f/u. No answer, VM left encouraging pt to return writer's call with any concerns or barriers they may have. Attempted to contact pts emergency contact, no answer. Will continue to follow.      Electronically signed by Madelaine Street RN on 2022 at 10:01 AM
spontaneous/unlabored

## 2024-02-17 DIAGNOSIS — E78.2 MIXED HYPERLIPIDEMIA: ICD-10-CM

## 2024-02-19 RX ORDER — ATORVASTATIN CALCIUM 20 MG/1
TABLET, FILM COATED ORAL
Qty: 90 TABLET | Refills: 3 | Status: SHIPPED | OUTPATIENT
Start: 2024-02-19

## 2024-02-19 NOTE — TELEPHONE ENCOUNTER
Please Approve or Refuse.  Send to Pharmacy per Pt's Request:      Next Visit Date:  5/13/2024   Last Visit Date: 1/11/2024    No results found for: \"LABA1C\"          ( goal A1C is < 7)   BP Readings from Last 3 Encounters:   01/11/24 130/86   01/08/24 (!) 165/92   11/29/23 (!) 140/83          (goal 120/80)  BUN   Date Value Ref Range Status   01/03/2024 19 8 - 23 mg/dL Final     Creatinine   Date Value Ref Range Status   01/03/2024 0.5 0.5 - 0.9 mg/dL Final     POC Creatinine   Date Value Ref Range Status   01/03/2024 <0.3 (L) 0.51 - 1.19 mg/dL Final     Potassium   Date Value Ref Range Status   01/03/2024 4.4 3.7 - 5.3 mmol/L Final

## 2024-02-26 ENCOUNTER — OFFICE VISIT (OUTPATIENT)
Dept: FAMILY MEDICINE CLINIC | Age: 74
End: 2024-02-26
Payer: MEDICARE

## 2024-02-26 VITALS
HEART RATE: 84 BPM | OXYGEN SATURATION: 95 % | TEMPERATURE: 98.1 F | SYSTOLIC BLOOD PRESSURE: 168 MMHG | DIASTOLIC BLOOD PRESSURE: 84 MMHG

## 2024-02-26 DIAGNOSIS — G89.29 CHRONIC LEFT-SIDED LOW BACK PAIN WITH LEFT-SIDED SCIATICA: Primary | ICD-10-CM

## 2024-02-26 DIAGNOSIS — M25.552 LEFT HIP PAIN: ICD-10-CM

## 2024-02-26 DIAGNOSIS — M54.42 CHRONIC LEFT-SIDED LOW BACK PAIN WITH LEFT-SIDED SCIATICA: Primary | ICD-10-CM

## 2024-02-26 PROCEDURE — 3017F COLORECTAL CA SCREEN DOC REV: CPT

## 2024-02-26 PROCEDURE — 1090F PRES/ABSN URINE INCON ASSESS: CPT

## 2024-02-26 PROCEDURE — 1123F ACP DISCUSS/DSCN MKR DOCD: CPT

## 2024-02-26 PROCEDURE — G8484 FLU IMMUNIZE NO ADMIN: HCPCS

## 2024-02-26 PROCEDURE — G8427 DOCREV CUR MEDS BY ELIG CLIN: HCPCS

## 2024-02-26 PROCEDURE — G8399 PT W/DXA RESULTS DOCUMENT: HCPCS

## 2024-02-26 PROCEDURE — G8417 CALC BMI ABV UP PARAM F/U: HCPCS

## 2024-02-26 PROCEDURE — 1036F TOBACCO NON-USER: CPT

## 2024-02-26 PROCEDURE — 99213 OFFICE O/P EST LOW 20 MIN: CPT

## 2024-02-26 ASSESSMENT — ENCOUNTER SYMPTOMS
COLOR CHANGE: 0
BOWEL INCONTINENCE: 0
BACK PAIN: 1
ABDOMINAL PAIN: 0

## 2024-02-26 NOTE — PROGRESS NOTES
daily 90 tablet 3    omeprazole (PRILOSEC) 20 MG delayed release capsule take 1 capsule by mouth once daily BEFORE A MEAL 60 capsule 2    Handicap Placard MISC by Does not apply route Expires on 12/31/27 1 each 0    ibandronate (BONIVA) 150 MG tablet Take 1 tablet by mouth every 30 days Take one (1) tablet once per month in the morning with a full glass of water, on an empty stomach, and do not take anything else by mouth or lie down for the next 60 minutes. 3 tablet 3    Handicap Placard MISC by Does not apply route Expires on 12/27 1 each 0    HYDROcodone-acetaminophen (NORCO) 5-325 MG per tablet Take 1 tablet by mouth 2 times daily as needed for Pain.      vitamin D3 (CHOLECALCIFEROL) 25 MCG (1000 UT) TABS tablet Take 1 tablet by mouth daily 90 tablet 1    ondansetron (ZOFRAN ODT) 8 MG TBDP disintegrating tablet Take 1 tablet by mouth every 8 hours as needed for Nausea or Vomiting 30 tablet 3    Multiple Vitamin (MULTI VITAMIN PO) Take by mouth       No current facility-administered medications for this visit.     No Known Allergies    Subjective:      Review of Systems   Constitutional:  Negative for activity change, appetite change, fever and weight loss.   Cardiovascular:  Negative for chest pain.   Gastrointestinal:  Negative for abdominal pain and bowel incontinence.   Genitourinary:  Positive for bladder incontinence (baseline). Negative for dysuria and pelvic pain.   Musculoskeletal:  Positive for arthralgias, back pain, gait problem and myalgias. Negative for joint swelling.   Skin:  Negative for color change, rash and wound.   Neurological:  Negative for tingling, weakness, numbness, headaches and paresthesias.   All other systems reviewed and are negative.    14 systems reviewed and negative except as listed in HPI.    Objective:     Physical Exam  Vitals reviewed.   Constitutional:       General: She is not in acute distress.     Appearance: Normal appearance. She is obese. She is not ill-appearing,

## 2024-03-05 ENCOUNTER — OFFICE VISIT (OUTPATIENT)
Dept: ORTHOPEDIC SURGERY | Age: 74
End: 2024-03-05
Payer: MEDICARE

## 2024-03-05 VITALS — BODY MASS INDEX: 33.86 KG/M2 | HEIGHT: 62 IN | RESPIRATION RATE: 14 BRPM | WEIGHT: 184 LBS

## 2024-03-05 DIAGNOSIS — M43.10 ACQUIRED SPONDYLOLISTHESIS: ICD-10-CM

## 2024-03-05 DIAGNOSIS — S32.010A CLOSED COMPRESSION FRACTURE OF BODY OF L1 VERTEBRA (HCC): ICD-10-CM

## 2024-03-05 DIAGNOSIS — M54.42 ACUTE BILATERAL LOW BACK PAIN WITH LEFT-SIDED SCIATICA: Primary | ICD-10-CM

## 2024-03-05 PROCEDURE — 99203 OFFICE O/P NEW LOW 30 MIN: CPT | Performed by: ORTHOPAEDIC SURGERY

## 2024-03-05 PROCEDURE — G8399 PT W/DXA RESULTS DOCUMENT: HCPCS | Performed by: ORTHOPAEDIC SURGERY

## 2024-03-05 PROCEDURE — G8427 DOCREV CUR MEDS BY ELIG CLIN: HCPCS | Performed by: ORTHOPAEDIC SURGERY

## 2024-03-05 PROCEDURE — 3017F COLORECTAL CA SCREEN DOC REV: CPT | Performed by: ORTHOPAEDIC SURGERY

## 2024-03-05 PROCEDURE — 1090F PRES/ABSN URINE INCON ASSESS: CPT | Performed by: ORTHOPAEDIC SURGERY

## 2024-03-05 PROCEDURE — G8417 CALC BMI ABV UP PARAM F/U: HCPCS | Performed by: ORTHOPAEDIC SURGERY

## 2024-03-05 PROCEDURE — 1036F TOBACCO NON-USER: CPT | Performed by: ORTHOPAEDIC SURGERY

## 2024-03-05 PROCEDURE — G8484 FLU IMMUNIZE NO ADMIN: HCPCS | Performed by: ORTHOPAEDIC SURGERY

## 2024-03-05 PROCEDURE — 1123F ACP DISCUSS/DSCN MKR DOCD: CPT | Performed by: ORTHOPAEDIC SURGERY

## 2024-03-05 SDOH — HEALTH STABILITY: PHYSICAL HEALTH
ON AVERAGE, HOW MANY DAYS PER WEEK DO YOU ENGAGE IN MODERATE TO STRENUOUS EXERCISE (LIKE A BRISK WALK)?: PATIENT DECLINED

## 2024-03-05 NOTE — PROGRESS NOTES
Patient ID: Nallely Mcdermott is a 73 y.o. female    Chief Compliant:  No chief complaint on file.       Diagnostic imaging:  AP lateral lumbar spine grade 1 spondylolisthesis L4-5 diffuse osteopenia L1 compression deformity new since 2022      Assessment and Plan:  1. Acute bilateral low back pain with left-sided sciatica    2. Acquired spondylolisthesis    3. Closed compression fracture of body of L1 vertebra (HCC)      Predominantly acute low back pain 3-week duration requiring narcotics    Suspect insufficiency fracture L1    New onset belt line low back pain with radicular buttock pain    MRI lumbar spine    Follow up after imaging    HPI:  This is a 73 y.o. female who presents to the clinic today as a new patient for back evaluation.      She complains of new back pain.     Patient states that she has cancer and she thinks that she is cured from it.     Review of Systems   All other systems reviewed and are negative.      Past History:    Current Outpatient Medications:     atorvastatin (LIPITOR) 20 MG tablet, take 1 tablet by mouth once daily, Disp: 90 tablet, Rfl: 3    omeprazole (PRILOSEC) 20 MG delayed release capsule, take 1 capsule by mouth once daily BEFORE A MEAL, Disp: 60 capsule, Rfl: 2    Handicap Placard MISC, by Does not apply route Expires on 12/31/27, Disp: 1 each, Rfl: 0    ibandronate (BONIVA) 150 MG tablet, Take 1 tablet by mouth every 30 days Take one (1) tablet once per month in the morning with a full glass of water, on an empty stomach, and do not take anything else by mouth or lie down for the next 60 minutes., Disp: 3 tablet, Rfl: 3    Handicap Placard MISC, by Does not apply route Expires on 12/27, Disp: 1 each, Rfl: 0    HYDROcodone-acetaminophen (NORCO) 5-325 MG per tablet, Take 1 tablet by mouth 2 times daily as needed for Pain., Disp: , Rfl:     vitamin D3 (CHOLECALCIFEROL) 25 MCG (1000 UT) TABS tablet, Take 1 tablet by mouth daily, Disp: 90 tablet, Rfl: 1    ondansetron (ZOFRAN ODT)

## 2024-03-06 DIAGNOSIS — F41.9 ANXIETY: Primary | ICD-10-CM

## 2024-03-06 DIAGNOSIS — S32.010A CLOSED COMPRESSION FRACTURE OF BODY OF L1 VERTEBRA (HCC): ICD-10-CM

## 2024-03-06 DIAGNOSIS — M43.10 ACQUIRED SPONDYLOLISTHESIS: ICD-10-CM

## 2024-03-06 DIAGNOSIS — M54.42 ACUTE BILATERAL LOW BACK PAIN WITH LEFT-SIDED SCIATICA: ICD-10-CM

## 2024-03-06 RX ORDER — DIAZEPAM 5 MG/1
TABLET ORAL
Qty: 1 TABLET | Refills: 0 | Status: SHIPPED | OUTPATIENT
Start: 2024-03-06 | End: 2024-03-16

## 2024-03-06 NOTE — TELEPHONE ENCOUNTER
Patient called to schedule f/u with Dr. Roth for her lumbar MRI results. MRI scheduled for 3/11. Patient requested appt at [facility] [place]. Appt for 3/19 scheduled.

## 2024-03-06 NOTE — TELEPHONE ENCOUNTER
Dr. Roth,    Patient requesting a script to alleviate her anxiety during her lumbar MRI that is scheduled for 3/11.    Valium 5 mg 1# pended for patient    [facility] on [address] pharmacy confirmed with patient.

## 2024-03-11 ENCOUNTER — HOSPITAL ENCOUNTER (OUTPATIENT)
Dept: MRI IMAGING | Age: 74
Discharge: HOME | End: 2024-03-13
Attending: ORTHOPAEDIC SURGERY
Payer: MEDICARE

## 2024-03-11 DIAGNOSIS — M54.42 ACUTE BILATERAL LOW BACK PAIN WITH LEFT-SIDED SCIATICA: ICD-10-CM

## 2024-03-11 DIAGNOSIS — M43.10 ACQUIRED SPONDYLOLISTHESIS: ICD-10-CM

## 2024-03-11 DIAGNOSIS — S32.010A CLOSED COMPRESSION FRACTURE OF BODY OF L1 VERTEBRA (HCC): ICD-10-CM

## 2024-03-11 PROCEDURE — 72148 MRI LUMBAR SPINE W/O DYE: CPT

## 2024-03-13 ENCOUNTER — HOSPITAL ENCOUNTER (INPATIENT)
Age: 74
LOS: 1 days | Discharge: HOME | DRG: 478 | End: 2024-03-15
Attending: EMERGENCY MEDICINE | Admitting: INTERNAL MEDICINE
Payer: MEDICARE

## 2024-03-13 ENCOUNTER — TELEPHONE (OUTPATIENT)
Dept: ORTHOPEDIC SURGERY | Age: 74
End: 2024-03-13

## 2024-03-13 DIAGNOSIS — S32.020A COMPRESSION FRACTURE OF L2 VERTEBRA, INITIAL ENCOUNTER (HCC): ICD-10-CM

## 2024-03-13 DIAGNOSIS — S32.010A COMPRESSION FRACTURE OF L1 VERTEBRA, INITIAL ENCOUNTER (HCC): Primary | ICD-10-CM

## 2024-03-13 PROBLEM — R26.2 AMBULATORY DYSFUNCTION: Status: ACTIVE | Noted: 2024-03-13

## 2024-03-13 PROBLEM — C80.1 CANCER (HCC): Status: ACTIVE | Noted: 2024-03-13

## 2024-03-13 PROBLEM — S32.009A: Status: ACTIVE | Noted: 2024-03-13

## 2024-03-13 LAB
ANION GAP SERPL CALCULATED.3IONS-SCNC: 11 MMOL/L (ref 9–17)
BASOPHILS # BLD: <0.03 K/UL (ref 0–0.2)
BASOPHILS NFR BLD: 0 % (ref 0–2)
BUN SERPL-MCNC: 14 MG/DL (ref 8–23)
BUN/CREAT SERPL: 23 (ref 9–20)
CALCIUM SERPL-MCNC: 9.4 MG/DL (ref 8.6–10.4)
CHLORIDE SERPL-SCNC: 99 MMOL/L (ref 98–107)
CO2 SERPL-SCNC: 28 MMOL/L (ref 20–31)
CREAT SERPL-MCNC: 0.6 MG/DL (ref 0.5–0.9)
EOSINOPHIL # BLD: 0.04 K/UL (ref 0–0.44)
EOSINOPHILS RELATIVE PERCENT: 1 % (ref 1–4)
ERYTHROCYTE [DISTWIDTH] IN BLOOD BY AUTOMATED COUNT: 12.3 % (ref 11.8–14.4)
GFR SERPL CREATININE-BSD FRML MDRD: >60 ML/MIN/1.73M2
GLUCOSE SERPL-MCNC: 87 MG/DL (ref 70–99)
HCT VFR BLD AUTO: 44.1 % (ref 36.3–47.1)
HGB BLD-MCNC: 14.9 G/DL (ref 11.9–15.1)
IMM GRANULOCYTES # BLD AUTO: 0 K/UL (ref 0–0.3)
IMM GRANULOCYTES NFR BLD: 0 %
LYMPHOCYTES NFR BLD: 1.01 K/UL (ref 1.1–3.7)
LYMPHOCYTES RELATIVE PERCENT: 24 % (ref 24–43)
MCH RBC QN AUTO: 32.7 PG (ref 25.2–33.5)
MCHC RBC AUTO-ENTMCNC: 33.8 G/DL (ref 28.4–34.8)
MCV RBC AUTO: 96.9 FL (ref 82.6–102.9)
MONOCYTES NFR BLD: 0.28 K/UL (ref 0.1–1.2)
MONOCYTES NFR BLD: 7 % (ref 3–12)
NEUTROPHILS NFR BLD: 68 % (ref 36–65)
NEUTS SEG NFR BLD: 2.86 K/UL (ref 1.5–8.1)
NRBC BLD-RTO: 0 PER 100 WBC
PLATELET # BLD AUTO: 189 K/UL (ref 138–453)
PMV BLD AUTO: 8.7 FL (ref 8.1–13.5)
POTASSIUM SERPL-SCNC: 4.1 MMOL/L (ref 3.7–5.3)
RBC # BLD AUTO: 4.55 M/UL (ref 3.95–5.11)
SODIUM SERPL-SCNC: 138 MMOL/L (ref 135–144)
WBC OTHER # BLD: 4.2 K/UL (ref 3.5–11.3)

## 2024-03-13 PROCEDURE — 96375 TX/PRO/DX INJ NEW DRUG ADDON: CPT

## 2024-03-13 PROCEDURE — 82306 VITAMIN D 25 HYDROXY: CPT

## 2024-03-13 PROCEDURE — 6370000000 HC RX 637 (ALT 250 FOR IP): Performed by: NURSE PRACTITIONER

## 2024-03-13 PROCEDURE — 6360000002 HC RX W HCPCS: Performed by: NURSE PRACTITIONER

## 2024-03-13 PROCEDURE — 80048 BASIC METABOLIC PNL TOTAL CA: CPT

## 2024-03-13 PROCEDURE — 6370000000 HC RX 637 (ALT 250 FOR IP): Performed by: EMERGENCY MEDICINE

## 2024-03-13 PROCEDURE — 2580000003 HC RX 258: Performed by: NURSE PRACTITIONER

## 2024-03-13 PROCEDURE — 96372 THER/PROPH/DIAG INJ SC/IM: CPT

## 2024-03-13 PROCEDURE — 6360000002 HC RX W HCPCS: Performed by: EMERGENCY MEDICINE

## 2024-03-13 PROCEDURE — G0378 HOSPITAL OBSERVATION PER HR: HCPCS

## 2024-03-13 PROCEDURE — 99222 1ST HOSP IP/OBS MODERATE 55: CPT | Performed by: NURSE PRACTITIONER

## 2024-03-13 PROCEDURE — 85025 COMPLETE CBC W/AUTO DIFF WBC: CPT

## 2024-03-13 PROCEDURE — 96374 THER/PROPH/DIAG INJ IV PUSH: CPT

## 2024-03-13 PROCEDURE — 99285 EMERGENCY DEPT VISIT HI MDM: CPT

## 2024-03-13 RX ORDER — HYDROCODONE BITARTRATE AND ACETAMINOPHEN 5; 325 MG/1; MG/1
1 TABLET ORAL 2 TIMES DAILY PRN
Status: DISCONTINUED | OUTPATIENT
Start: 2024-03-13 | End: 2024-03-15

## 2024-03-13 RX ORDER — POTASSIUM CHLORIDE 20 MEQ/1
40 TABLET, EXTENDED RELEASE ORAL PRN
Status: DISCONTINUED | OUTPATIENT
Start: 2024-03-13 | End: 2024-03-15 | Stop reason: HOSPADM

## 2024-03-13 RX ORDER — ONDANSETRON 2 MG/ML
4 INJECTION INTRAMUSCULAR; INTRAVENOUS EVERY 6 HOURS PRN
Status: DISCONTINUED | OUTPATIENT
Start: 2024-03-13 | End: 2024-03-15 | Stop reason: HOSPADM

## 2024-03-13 RX ORDER — SODIUM CHLORIDE 0.9 % (FLUSH) 0.9 %
10 SYRINGE (ML) INJECTION PRN
Status: DISCONTINUED | OUTPATIENT
Start: 2024-03-13 | End: 2024-03-15 | Stop reason: HOSPADM

## 2024-03-13 RX ORDER — DIAZEPAM 5 MG/1
5 TABLET ORAL EVERY 6 HOURS PRN
Status: DISCONTINUED | OUTPATIENT
Start: 2024-03-13 | End: 2024-03-15 | Stop reason: HOSPADM

## 2024-03-13 RX ORDER — MORPHINE SULFATE 2 MG/ML
2 INJECTION, SOLUTION INTRAMUSCULAR; INTRAVENOUS EVERY 4 HOURS PRN
Status: DISCONTINUED | OUTPATIENT
Start: 2024-03-13 | End: 2024-03-14

## 2024-03-13 RX ORDER — ACETAMINOPHEN 325 MG/1
650 TABLET ORAL EVERY 6 HOURS PRN
Status: DISCONTINUED | OUTPATIENT
Start: 2024-03-13 | End: 2024-03-15

## 2024-03-13 RX ORDER — SODIUM CHLORIDE 0.9 % (FLUSH) 0.9 %
5-40 SYRINGE (ML) INJECTION EVERY 12 HOURS SCHEDULED
Status: DISCONTINUED | OUTPATIENT
Start: 2024-03-13 | End: 2024-03-15 | Stop reason: HOSPADM

## 2024-03-13 RX ORDER — KETOROLAC TROMETHAMINE 15 MG/ML
15 INJECTION, SOLUTION INTRAMUSCULAR; INTRAVENOUS ONCE
Status: COMPLETED | OUTPATIENT
Start: 2024-03-13 | End: 2024-03-13

## 2024-03-13 RX ORDER — HYDROCODONE BITARTRATE AND ACETAMINOPHEN 5; 325 MG/1; MG/1
1 TABLET ORAL ONCE
Status: COMPLETED | OUTPATIENT
Start: 2024-03-13 | End: 2024-03-13

## 2024-03-13 RX ORDER — POLYETHYLENE GLYCOL 3350 17 G/17G
17 POWDER, FOR SOLUTION ORAL DAILY PRN
Status: DISCONTINUED | OUTPATIENT
Start: 2024-03-13 | End: 2024-03-15 | Stop reason: HOSPADM

## 2024-03-13 RX ORDER — ONDANSETRON 4 MG/1
4 TABLET, ORALLY DISINTEGRATING ORAL EVERY 8 HOURS PRN
Status: DISCONTINUED | OUTPATIENT
Start: 2024-03-13 | End: 2024-03-15 | Stop reason: HOSPADM

## 2024-03-13 RX ORDER — MAGNESIUM SULFATE 1 G/100ML
1000 INJECTION INTRAVENOUS PRN
Status: DISCONTINUED | OUTPATIENT
Start: 2024-03-13 | End: 2024-03-15 | Stop reason: HOSPADM

## 2024-03-13 RX ORDER — SODIUM CHLORIDE 9 MG/ML
INJECTION, SOLUTION INTRAVENOUS PRN
Status: DISCONTINUED | OUTPATIENT
Start: 2024-03-13 | End: 2024-03-15 | Stop reason: HOSPADM

## 2024-03-13 RX ORDER — POTASSIUM CHLORIDE 7.45 MG/ML
10 INJECTION INTRAVENOUS PRN
Status: DISCONTINUED | OUTPATIENT
Start: 2024-03-13 | End: 2024-03-15 | Stop reason: HOSPADM

## 2024-03-13 RX ORDER — PANTOPRAZOLE SODIUM 40 MG/1
40 TABLET, DELAYED RELEASE ORAL
Status: DISCONTINUED | OUTPATIENT
Start: 2024-03-14 | End: 2024-03-15 | Stop reason: HOSPADM

## 2024-03-13 RX ORDER — DEXAMETHASONE SODIUM PHOSPHATE 10 MG/ML
8 INJECTION, SOLUTION INTRAMUSCULAR; INTRAVENOUS EVERY 24 HOURS
Status: DISCONTINUED | OUTPATIENT
Start: 2024-03-13 | End: 2024-03-15

## 2024-03-13 RX ORDER — ATORVASTATIN CALCIUM 20 MG/1
20 TABLET, FILM COATED ORAL NIGHTLY
Status: DISCONTINUED | OUTPATIENT
Start: 2024-03-13 | End: 2024-03-13 | Stop reason: CLARIF

## 2024-03-13 RX ORDER — LORAZEPAM 2 MG/ML
1 INJECTION INTRAMUSCULAR EVERY 6 HOURS PRN
Status: DISCONTINUED | OUTPATIENT
Start: 2024-03-13 | End: 2024-03-15 | Stop reason: HOSPADM

## 2024-03-13 RX ORDER — FENTANYL CITRATE 0.05 MG/ML
50 INJECTION, SOLUTION INTRAMUSCULAR; INTRAVENOUS ONCE
Status: COMPLETED | OUTPATIENT
Start: 2024-03-13 | End: 2024-03-13

## 2024-03-13 RX ORDER — ACETAMINOPHEN 650 MG/1
650 SUPPOSITORY RECTAL EVERY 6 HOURS PRN
Status: DISCONTINUED | OUTPATIENT
Start: 2024-03-13 | End: 2024-03-15

## 2024-03-13 RX ORDER — ATORVASTATIN CALCIUM 20 MG/1
20 TABLET, FILM COATED ORAL NIGHTLY
Status: DISCONTINUED | OUTPATIENT
Start: 2024-03-13 | End: 2024-03-15 | Stop reason: HOSPADM

## 2024-03-13 RX ORDER — ENOXAPARIN SODIUM 100 MG/ML
40 INJECTION SUBCUTANEOUS EVERY EVENING
Status: DISCONTINUED | OUTPATIENT
Start: 2024-03-13 | End: 2024-03-14

## 2024-03-13 RX ADMIN — SODIUM CHLORIDE, PRESERVATIVE FREE 10 ML: 5 INJECTION INTRAVENOUS at 20:42

## 2024-03-13 RX ADMIN — ENOXAPARIN SODIUM 40 MG: 100 INJECTION SUBCUTANEOUS at 20:42

## 2024-03-13 RX ADMIN — HYDROCODONE BITARTRATE AND ACETAMINOPHEN 1 TABLET: 5; 325 TABLET ORAL at 12:41

## 2024-03-13 RX ADMIN — HYDROCODONE BITARTRATE AND ACETAMINOPHEN 1 TABLET: 5; 325 TABLET ORAL at 23:54

## 2024-03-13 RX ADMIN — KETOROLAC TROMETHAMINE 15 MG: 15 INJECTION, SOLUTION INTRAMUSCULAR; INTRAVENOUS at 13:57

## 2024-03-13 RX ADMIN — DEXAMETHASONE SODIUM PHOSPHATE 8 MG: 10 INJECTION, SOLUTION INTRAMUSCULAR; INTRAVENOUS at 20:42

## 2024-03-13 RX ADMIN — FENTANYL CITRATE 50 MCG: 0.05 INJECTION, SOLUTION INTRAMUSCULAR; INTRAVENOUS at 16:33

## 2024-03-13 ASSESSMENT — ENCOUNTER SYMPTOMS
VOMITING: 0
EYE DISCHARGE: 0
WHEEZING: 0
NAUSEA: 0
SINUS PRESSURE: 0
SHORTNESS OF BREATH: 0
COUGH: 0
DIARRHEA: 0
BACK PAIN: 1
RHINORRHEA: 0
SORE THROAT: 0
ABDOMINAL PAIN: 0
SINUS PAIN: 0
PHOTOPHOBIA: 0
COLOR CHANGE: 0
CONSTIPATION: 0
EYE REDNESS: 0

## 2024-03-13 ASSESSMENT — PAIN SCALES - GENERAL
PAINLEVEL_OUTOF10: 10
PAINLEVEL_OUTOF10: 8
PAINLEVEL_OUTOF10: 8
PAINLEVEL_OUTOF10: 10
PAINLEVEL_OUTOF10: 5
PAINLEVEL_OUTOF10: 5

## 2024-03-13 ASSESSMENT — PAIN DESCRIPTION - PAIN TYPE
TYPE: ACUTE PAIN
TYPE: ACUTE PAIN

## 2024-03-13 ASSESSMENT — PAIN DESCRIPTION - DIRECTION: RADIATING_TOWARDS: DOWN LEFT LEG

## 2024-03-13 ASSESSMENT — PAIN DESCRIPTION - DESCRIPTORS
DESCRIPTORS: SHARP;SHOOTING
DESCRIPTORS: SHARP;SHOOTING
DESCRIPTORS: ACHING

## 2024-03-13 ASSESSMENT — PAIN DESCRIPTION - FREQUENCY
FREQUENCY: CONTINUOUS
FREQUENCY: CONTINUOUS

## 2024-03-13 ASSESSMENT — PAIN - FUNCTIONAL ASSESSMENT
PAIN_FUNCTIONAL_ASSESSMENT: 0-10
PAIN_FUNCTIONAL_ASSESSMENT: PREVENTS OR INTERFERES SOME ACTIVE ACTIVITIES AND ADLS

## 2024-03-13 ASSESSMENT — PAIN DESCRIPTION - ORIENTATION
ORIENTATION: LEFT;LOWER
ORIENTATION: MID

## 2024-03-13 ASSESSMENT — PAIN DESCRIPTION - LOCATION
LOCATION: BACK

## 2024-03-13 NOTE — ED NOTES
.ED to inpatient nurses report     Chief Complaint   Patient presents with    Back Pain     Had a MRI of back on Monday ordered by ortho seeing patient, pain in back has become intolerable, patient advised to present to ED.       Present to ED from Home   LOC: alert and orientated to name, place, date  Vital signs   Vitals:    03/13/24 1159 03/13/24 1230 03/13/24 1240   BP: (!) 157/96 (!) 143/64    Pulse: (!) 113  98   Resp: 16     Temp: 97.7 °F (36.5 °C)     SpO2: 96% 97%    Weight: 82.1 kg (181 lb)     Height: 1.575 m (5' 2\")        Oxygen Baseline 97    Current needs required Non    LDAs:    Mobility: Requires assistance * 2  Fall Risk:    Pending ED orders: None   Present condition: stable  Code Status: Full  Consults: IP CONSULT TO ORTHOPEDIC SURGERY  IP CONSULT TO ORTHOPEDIC SURGERY  []  Hospitalist  Completed  [] yes [] no Who:   []  Medicine  Completed  [] yes [] No Who:   []  Cardiology  Completed  [] yes [] No Who:   []  GI   Completed  [] yes [] No Who:   []  Neurology  Completed  [] yes [] No Who:   []  Nephrology Completed  [] yes [] No Who:    []  Vascular  Completed  [] yes [] No Who:   []  Ortho  Completed  [] yes [] No Who:     []  Surgery  Completed  [] yes [] No Who:    []  Urology  Completed  [] yes [] No Who:    []  CT Surgery Completed  [] yes [] No Who:   []  Podiatry  Completed  [] yes [] No Who:    []  Other    Completed  [] yes [] No Who:  Interventions: Toradol ,Norco  Important Events: None        Electronically signed by Pilar Ace RN on 3/13/2024 at 3:09 PM

## 2024-03-13 NOTE — PROGRESS NOTES
Patients daughter is her POA and requests a call tomorrow when we know if/when her surgery will be.     Her name is Khadijah and her phone number is [phone]

## 2024-03-13 NOTE — PLAN OF CARE
Patient independent but requests a standby when standing or walking due to pain limiting mobility. Incontinent and requesting a pure wick but would like to wait until later tonight. Talked to the NP about plans for surgery tomorrow or next week, NPO diet starting at midnight tonight, patient notified.     Problem: Discharge Planning  Goal: Discharge to home or other facility with appropriate resources  Outcome: Progressing  Flowsheets (Taken 3/13/2024 1912)  Discharge to home or other facility with appropriate resources:   Identify barriers to discharge with patient and caregiver   Arrange for needed discharge resources and transportation as appropriate   Identify discharge learning needs (meds, wound care, etc)   Refer to discharge planning if patient needs post-hospital services based on physician order or complex needs related to functional status, cognitive ability or social support system     Problem: Pain  Goal: Verbalizes/displays adequate comfort level or baseline comfort level  Outcome: Progressing  Flowsheets (Taken 3/13/2024 1912)  Verbalizes/displays adequate comfort level or baseline comfort level:   Encourage patient to monitor pain and request assistance   Assess pain using appropriate pain scale   Administer analgesics based on type and severity of pain and evaluate response   Implement non-pharmacological measures as appropriate and evaluate response   Notify Licensed Independent Practitioner if interventions unsuccessful or patient reports new pain     Problem: Safety - Adult  Goal: Free from fall injury  Outcome: Progressing  Flowsheets (Taken 3/13/2024 1912)  Free From Fall Injury:   Instruct family/caregiver on patient safety   Based on caregiver fall risk screen, instruct family/caregiver to ask for assistance with transferring infant if caregiver noted to have fall risk factors     Problem: ABCDS Injury Assessment  Goal: Absence of physical injury  Outcome: Progressing  Flowsheets (Taken

## 2024-03-13 NOTE — TELEPHONE ENCOUNTER
The patient's daughter, Khadijah, called in stating that the patient is steadily declining and is now having difficulty with walking. (Khadijah is on the communication list). Advised the daughter that it's difficult to say if the patient's symptoms are coming from her lower back/spine, but would recommend the patient go to the ER for evaluation. Khadijah verbalized understanding.

## 2024-03-13 NOTE — PROGRESS NOTES
Pt admitted to room 2011 per stretcher in stable condition from ER  Oriented to room and surroundings  Bed in lowest position, wheels locked, 2/4 side rails up  Call light in reach, room free of clutter, adequate lighting provided  Denies any further questions at this time  Instructed to call out with any questions/concerns/new onset of pain and/or n/v   White board updated  Continue to monitor with hourly rounding  STAY WITH ME protocol initiated   Bed alarm on/Fall Risk signs in place/Fall risk sticker to wrist band  Non-skid socks on/at bedside

## 2024-03-13 NOTE — ED NOTES
Pt presents to ER from home due to back pain. Pt denies injury or trauma. States back pain started x 3 weeks ago. Pt is A/O x 4, equal chest expansion with non labored breathing, wheels locked, bed in lowest position,call light in reach.pt daughter at bedside.

## 2024-03-13 NOTE — H&P
[facility]  Office: [phone]  Larry Salguero DO, Maurice Arcos DO, Timmy Blue DO, Evan Parrish DO, Sergei Fernandez MD, Paula Sahu MD, Burt Marcos MD, Bria Lopez MD,  Jim Alvarado MD, Ken Alberts MD, Laurel Yu MD,  Corrina Delacruz DO, Malka Pink MD, Maurice Enriquez MD, Jerry Salguero DO, Susan Nguyen MD,  Luciano Delgado DO, Antonella Gamino MD, Raiza Calabrese MD, Marilee Trinh MD, Marylin Mcclure MD,  Davey Javier MD, Aravind Claros MD, Gabriel Lara MD, Le Negro MD, Toni Marcelo MD, Cyn Saldaña MD, Mark Osborne DO, Saeid Posada DO, Mayra Torres MD,  Agustin Clay MD, Shirley Waterhouse, CNP,  Lynsey Ley, CNP, Tio Wilcox, CNP,  Irma Olivares, DNP, Clementina Martines, CNP, Shirley Chowdhury, CNP, Jenny Sykes CNP, Zainab Car, CNP, Kori Moore, CNP, Patt Pat, PA-C, Lu Heller, PA-C, Cherie Fermin, CNP, Shannan Hugo, CNP, Jaleesa Rowland, CNP, Edita Velasquez, CNS, Bernice Álvarez, CNP, Mulu Mccormick, CNP, Tracy Schwab, CNP         [facility]   IN-PATIENT SERVICE   [facility]    HISTORY AND PHYSICAL EXAMINATION            Date:   3/13/2024  Patient name:  Nallely Mcdermott  Date of admission:  3/13/2024 11:58 AM  MRN:   [id number]  Account:  [id number]  YOB: 1950  PCP:    Anjum Marcos MD  Room:   2011/2011-02  Code Status:    Full Code    Chief Complaint:     Chief Complaint   Patient presents with    Back Pain     Had a MRI of back on Monday ordered by ortho seeing patient, pain in back has become intolerable, patient advised to present to ED.        History Obtained From:     patient    History of Present Illness:     Nallely Mcdermott is a 73 y.o. Non- / non  female who presents with Back Pain (Had a MRI of back on Monday ordered by ortho seeing patient, pain in back has become intolerable, patient advised to present to ED. )   and is admitted to the hospital for the management

## 2024-03-13 NOTE — ED NOTES
Writer attempted to Ambulate pt, Pt was only able to make it 10 steps, Pt had to have 2 people assist her back to the bed.

## 2024-03-14 ENCOUNTER — APPOINTMENT (OUTPATIENT)
Dept: MRI IMAGING | Age: 74
DRG: 478 | End: 2024-03-14
Payer: MEDICARE

## 2024-03-14 ENCOUNTER — APPOINTMENT (OUTPATIENT)
Dept: CT IMAGING | Age: 74
DRG: 478 | End: 2024-03-14
Payer: MEDICARE

## 2024-03-14 PROBLEM — M48.56XA PATHOLOGICAL COMPRESSION FRACTURE OF LUMBAR VERTEBRA (HCC): Status: ACTIVE | Noted: 2024-03-14

## 2024-03-14 PROBLEM — M80.00XA AGE-RELATED OSTEOPOROSIS WITH CURRENT PATHOLOGICAL FRACTURE: Status: ACTIVE | Noted: 2019-01-23

## 2024-03-14 PROBLEM — M54.9 INTRACTABLE BACK PAIN: Status: ACTIVE | Noted: 2024-03-14

## 2024-03-14 PROBLEM — M43.10 DEGENERATIVE SPONDYLOLISTHESIS: Status: ACTIVE | Noted: 2024-03-14

## 2024-03-14 LAB
25(OH)D3 SERPL-MCNC: 32.2 NG/ML (ref 30–100)
25(OH)D3 SERPL-MCNC: 35.2 NG/ML (ref 30–100)
ANION GAP SERPL CALCULATED.3IONS-SCNC: 10 MMOL/L (ref 9–17)
BASOPHILS # BLD: 0 K/UL (ref 0–0.2)
BASOPHILS NFR BLD: 0 % (ref 0–2)
BUN SERPL-MCNC: 27 MG/DL (ref 8–23)
BUN/CREAT SERPL: 45 (ref 9–20)
CALCIUM SERPL-MCNC: 9.3 MG/DL (ref 8.6–10.4)
CHLORIDE SERPL-SCNC: 106 MMOL/L (ref 98–107)
CO2 SERPL-SCNC: 24 MMOL/L (ref 20–31)
CREAT SERPL-MCNC: 0.6 MG/DL (ref 0.5–0.9)
EOSINOPHIL # BLD: 0 K/UL (ref 0–0.44)
EOSINOPHILS RELATIVE PERCENT: 0 % (ref 1–4)
ERYTHROCYTE [DISTWIDTH] IN BLOOD BY AUTOMATED COUNT: 12.1 % (ref 11.8–14.4)
GFR SERPL CREATININE-BSD FRML MDRD: >60 ML/MIN/1.73M2
GLUCOSE SERPL-MCNC: 146 MG/DL (ref 70–99)
HCT VFR BLD AUTO: 41.9 % (ref 36.3–47.1)
HGB BLD-MCNC: 14 G/DL (ref 11.9–15.1)
IMM GRANULOCYTES # BLD AUTO: 0 K/UL (ref 0–0.3)
IMM GRANULOCYTES NFR BLD: 0 %
INR PPP: 1
LYMPHOCYTES NFR BLD: 0.6 K/UL (ref 1.1–3.7)
LYMPHOCYTES RELATIVE PERCENT: 17 % (ref 24–43)
MCH RBC QN AUTO: 32.7 PG (ref 25.2–33.5)
MCHC RBC AUTO-ENTMCNC: 33.4 G/DL (ref 28.4–34.8)
MCV RBC AUTO: 97.9 FL (ref 82.6–102.9)
MONOCYTES NFR BLD: 0.04 K/UL (ref 0.1–1.2)
MONOCYTES NFR BLD: 1 % (ref 3–12)
NEUTROPHILS NFR BLD: 82 % (ref 36–65)
NEUTS SEG NFR BLD: 2.86 K/UL (ref 1.5–8.1)
NRBC BLD-RTO: 0 PER 100 WBC
PLATELET # BLD AUTO: 177 K/UL (ref 138–453)
PMV BLD AUTO: 9.2 FL (ref 8.1–13.5)
POTASSIUM SERPL-SCNC: 4.7 MMOL/L (ref 3.7–5.3)
PROTHROMBIN TIME: 12.9 SEC (ref 11.5–14.2)
RBC # BLD AUTO: 4.28 M/UL (ref 3.95–5.11)
SODIUM SERPL-SCNC: 140 MMOL/L (ref 135–144)
WBC OTHER # BLD: 3.5 K/UL (ref 3.5–11.3)

## 2024-03-14 PROCEDURE — G0378 HOSPITAL OBSERVATION PER HR: HCPCS

## 2024-03-14 PROCEDURE — 99232 SBSQ HOSP IP/OBS MODERATE 35: CPT | Performed by: NURSE PRACTITIONER

## 2024-03-14 PROCEDURE — 73700 CT LOWER EXTREMITY W/O DYE: CPT

## 2024-03-14 PROCEDURE — 6370000000 HC RX 637 (ALT 250 FOR IP): Performed by: NURSE PRACTITIONER

## 2024-03-14 PROCEDURE — 72146 MRI CHEST SPINE W/O DYE: CPT

## 2024-03-14 PROCEDURE — 2580000003 HC RX 258: Performed by: NURSE PRACTITIONER

## 2024-03-14 PROCEDURE — 96375 TX/PRO/DX INJ NEW DRUG ADDON: CPT

## 2024-03-14 PROCEDURE — 85025 COMPLETE CBC W/AUTO DIFF WBC: CPT

## 2024-03-14 PROCEDURE — 96376 TX/PRO/DX INJ SAME DRUG ADON: CPT

## 2024-03-14 PROCEDURE — 80048 BASIC METABOLIC PNL TOTAL CA: CPT

## 2024-03-14 PROCEDURE — 96372 THER/PROPH/DIAG INJ SC/IM: CPT

## 2024-03-14 PROCEDURE — 82306 VITAMIN D 25 HYDROXY: CPT

## 2024-03-14 PROCEDURE — 36415 COLL VENOUS BLD VENIPUNCTURE: CPT

## 2024-03-14 PROCEDURE — 85610 PROTHROMBIN TIME: CPT

## 2024-03-14 PROCEDURE — 6360000002 HC RX W HCPCS: Performed by: NURSE PRACTITIONER

## 2024-03-14 RX ORDER — LORAZEPAM 2 MG/ML
2 INJECTION INTRAMUSCULAR
Status: COMPLETED | OUTPATIENT
Start: 2024-03-14 | End: 2024-03-14

## 2024-03-14 RX ADMIN — SODIUM CHLORIDE, PRESERVATIVE FREE 10 ML: 5 INJECTION INTRAVENOUS at 09:26

## 2024-03-14 RX ADMIN — SODIUM CHLORIDE, PRESERVATIVE FREE 10 ML: 5 INJECTION INTRAVENOUS at 16:59

## 2024-03-14 RX ADMIN — LORAZEPAM 2 MG: 2 INJECTION INTRAMUSCULAR; INTRAVENOUS at 09:26

## 2024-03-14 RX ADMIN — HYDROMORPHONE HYDROCHLORIDE 0.5 MG: 1 INJECTION, SOLUTION INTRAMUSCULAR; INTRAVENOUS; SUBCUTANEOUS at 10:25

## 2024-03-14 RX ADMIN — DEXAMETHASONE SODIUM PHOSPHATE 8 MG: 10 INJECTION, SOLUTION INTRAMUSCULAR; INTRAVENOUS at 16:57

## 2024-03-14 RX ADMIN — HYDROCODONE BITARTRATE AND ACETAMINOPHEN 1 TABLET: 5; 325 TABLET ORAL at 16:56

## 2024-03-14 RX ADMIN — SODIUM CHLORIDE, PRESERVATIVE FREE 10 ML: 5 INJECTION INTRAVENOUS at 20:22

## 2024-03-14 ASSESSMENT — PAIN DESCRIPTION - ORIENTATION
ORIENTATION: LOWER
ORIENTATION: LOWER

## 2024-03-14 ASSESSMENT — PAIN DESCRIPTION - DESCRIPTORS
DESCRIPTORS: ACHING
DESCRIPTORS: ACHING;DISCOMFORT

## 2024-03-14 ASSESSMENT — PAIN - FUNCTIONAL ASSESSMENT
PAIN_FUNCTIONAL_ASSESSMENT: PREVENTS OR INTERFERES SOME ACTIVE ACTIVITIES AND ADLS
PAIN_FUNCTIONAL_ASSESSMENT: PREVENTS OR INTERFERES SOME ACTIVE ACTIVITIES AND ADLS

## 2024-03-14 ASSESSMENT — PAIN SCALES - GENERAL
PAINLEVEL_OUTOF10: 4
PAINLEVEL_OUTOF10: 5
PAINLEVEL_OUTOF10: 4
PAINLEVEL_OUTOF10: 6
PAINLEVEL_OUTOF10: 7

## 2024-03-14 ASSESSMENT — PAIN DESCRIPTION - PAIN TYPE
TYPE: ACUTE PAIN
TYPE: ACUTE PAIN

## 2024-03-14 ASSESSMENT — PAIN DESCRIPTION - FREQUENCY
FREQUENCY: CONTINUOUS
FREQUENCY: CONTINUOUS

## 2024-03-14 ASSESSMENT — PAIN DESCRIPTION - ONSET
ONSET: ON-GOING
ONSET: ON-GOING

## 2024-03-14 ASSESSMENT — PAIN DESCRIPTION - LOCATION
LOCATION: BACK
LOCATION: BACK

## 2024-03-14 NOTE — CARE COORDINATION
Case Management Assessment  Initial Evaluation    Date/Time of Evaluation: 3/14/2024 2:04 PM  Assessment Completed by: CHACORTA Andrews, LSW    If patient is discharged prior to next notation, then this note serves as note for discharge by case management.    Patient Name: Nallely Mcdermott                   YOB: 1950  Diagnosis: Closed fracture of lumbar spine without lesion of spinal cord, initial encounter ([facility]) [S32.009A]  Compression fracture of L1 vertebra, initial encounter ([facility]) [S32.010A]                   Date / Time: 3/13/2024 11:58 AM    Patient Admission Status: Observation   Readmission Risk (Low < 19, Mod (19-27), High > 27): No data recorded  Current PCP: Anjum Marcos MD  PCP verified by ?      Chart Reviewed: Yes      History Provided by:    Patient Orientation:      Patient Cognition:      Hospitalization in the last 30 days (Readmission):  No    If yes, Readmission Assessment in  Navigator will be completed.    Advance Directives:      Code Status: Full Code   Patient's Primary Decision Maker is:      Primary Decision Maker: Khadijah Gonzales - Child - [phone]    Secondary Decision Maker: Jerome Oneil - Other - [phone]    Supplemental (Other) Decision Maker: Jerry Mcdermott - Child - [phone]    Discharge Planning:    Patient lives with: Family Members (sister and niece) Type of Home: House  Primary Care Giver:    Patient Support Systems include: Children, Family Members   Current Financial resources:    Current community resources:    Current services prior to admission: None            Current DME:              Type of Home Care services:  None    ADLS  Prior functional level:    Current functional level:      PT AM-PAC:   /24  OT AM-PAC:   /24    Family can provide assistance at DC:    Would you like Case Management to discuss the discharge plan with any other family members/significant others, and if so, who?    Plans to Return to Present Housing:   the Discharge Plan?      CHACORTA Andrews, LSW  Case Management Department  Ph: [phone] Fax: [phone]

## 2024-03-14 NOTE — PLAN OF CARE
Problem: Discharge Planning  Goal: Discharge to home or other facility with appropriate resources  3/14/2024 0015 by Christin Jackson RN  Outcome: Progressing  3/13/2024 1912 by Keturah Guevara RN  Outcome: Progressing  Flowsheets (Taken 3/13/2024 1912)  Discharge to home or other facility with appropriate resources:   Identify barriers to discharge with patient and caregiver   Arrange for needed discharge resources and transportation as appropriate   Identify discharge learning needs (meds, wound care, etc)   Refer to discharge planning if patient needs post-hospital services based on physician order or complex needs related to functional status, cognitive ability or social support system     Problem: Pain  Goal: Verbalizes/displays adequate comfort level or baseline comfort level  3/14/2024 0015 by Christin Jackson RN  Outcome: Progressing  3/13/2024 1912 by Keturah Guevara RN  Outcome: Progressing  Flowsheets (Taken 3/13/2024 1912)  Verbalizes/displays adequate comfort level or baseline comfort level:   Encourage patient to monitor pain and request assistance   Assess pain using appropriate pain scale   Administer analgesics based on type and severity of pain and evaluate response   Implement non-pharmacological measures as appropriate and evaluate response   Notify Licensed Independent Practitioner if interventions unsuccessful or patient reports new pain     Problem: Safety - Adult  Goal: Free from fall injury  3/14/2024 0015 by Christin Jackson RN  Outcome: Progressing  3/13/2024 1912 by Keturah Guevara RN  Outcome: Progressing  Flowsheets (Taken 3/13/2024 1912)  Free From Fall Injury:   Instruct family/caregiver on patient safety   Based on caregiver fall risk screen, instruct family/caregiver to ask for assistance with transferring infant if caregiver noted to have fall risk factors     Problem: ABCDS Injury Assessment  Goal: Absence of physical injury  3/14/2024 0015 by Christin Jackson, HAVEN  Outcome:  Progressing  3/13/2024 1912 by Keturah Guevara, RN  Outcome: Progressing  Flowsheets (Taken 3/13/2024 1912)  Absence of Physical Injury: Implement safety measures based on patient assessment

## 2024-03-14 NOTE — PROGRESS NOTES
[facility]  Office: [phone]  Larry Salguero DO, Maurice Arcos DO, Timmy Blue DO, Evan Parrish DO, Sergei Fernandez MD, Paula Sahu MD, Burt Marcos MD, Bria Lopez MD,  Jim Alvarado MD, Ken Alberts MD, Laurel Yu MD,  Corrina Delacruz DO, Malka Pink MD, Maurice Enriquez MD, Jerry Salguero DO, Susan Nguyen MD,  Luciano Delgado DO, Antonella Gamino MD, Raiza Calabrese MD, Marilee Trinh MD, Marylin Mcclure MD,  Davey Javier MD, Aravind Claros MD, Gabriel Lara MD, Le Negro MD, Toni Marcelo MD, Cyn Saldaña MD, Mark Osborne DO, Saeid Posada DO, Mayra Torres MD,  Agustin Clay MD, Shirley Waterhouse, CNP,  Lynsey Ley, CNP, Tio Wilcox, CNP,  Irma Olivares, DNP, Clementina Martines, CNP, Shirley Chowdhury, CNP, Jenny Sykes CNP, Zainab Car, CNP, Kori Moore, CNP, Patt Pat, PA-C, Lu Heller, PA-C, Cherie Fermin, CNP, Shannan Hugo, CNP, Jaleesa Rowland, CNP, Edita Velasquez, CNS, Bernice Álvarez, CNP, Mulu Mccormick, CNP, Tracy Schwab, CNP         [facility]   IN-PATIENT SERVICE   [facility]    Progress Note    3/14/2024    8:51 AM    Name:   Nallely Mcdermott  MRN:     [id number]     Acct:      [id number]   Room:   2011/2011-02  IP Day:  0  Admit Date:  3/13/2024 11:58 AM    PCP:   Anjum Marcos MD  Code Status:  Full Code    Subjective:     C/C:   Chief Complaint   Patient presents with    Back Pain     Had a MRI of back on Monday ordered by ortho seeing patient, pain in back has become intolerable, patient advised to present to ED.      Interval History Status: significantly improved.     Patient reports significant improvement in her back pain.  She rates it at a 4 and is now able to complete simple ADLs.  We await input from spinal orthopedic specialist.  MRIs have been ordered by their service.  Further hospital course, surgical intervention, and discharge to be determined following MRI and spinal orthopedic  by Tio Wilcox, APRN - NP     lymphoma            Plan:        Closed fracture lumbar spine without spinal cord lesion with intractable pain and ambulatory dysfunction  Pain control with morphine and Ativan  Steroids as ordered  Significant improvement, very effective  Consult orthopedic spinal specialist to discuss kyphoplasty in versus outpatient  Additional MRIs have been ordered by their service  Mixed hyperlipidemia  Continue statin    JACOBO Null - NP  3/14/2024  8:51 AM

## 2024-03-14 NOTE — PROGRESS NOTES
Physical Therapy  DATE: 3/14/2024    NAME: Nallely Mcdermott  MRN: [id number]   : 1950    Patient not seen this date for Physical Therapy due to:      [] Cancel by RN or physician due to:    [] Hemodialysis    [] Critical Lab Value Level     [] Blood transfusion in progress    [] Acute or unstable cardiovascular status   _MAP < 55 or more than >115  _HR < 40 or > 130    [] Acute or unstable pulmonary status   -FiO2 > 60%   _RR < 5 or >40    _O2 sats < 85%    [] Strict Bedrest    [] Off Unit for surgery or procedure    [] Off Unit for testing       [] Pending imaging to R/O fracture    [] Refusal by Patient      [x] Other  RN Kay notified of PT cx in am secondary to pending ortho consult secondary to severe back pain,  MRI is pending. PT continue to follow.    [] PT being discontinued at this time. Patient independent. No further needs.     [] PT being discontinued at this time as the patient has been transferred to hospice care. No further needs.      Diana Romano, PT
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
## 2024-03-14 NOTE — PROGRESS NOTES
Patient doing well. Pain at 4/10. Tolerating diet and voiding well. Has incontinence pad in place as she is incontinent. Resting comfortably in bed currently. Patient scheduled for kyphoplasty tomorrow at 8:45 p.m. Will continue to monitor the patient.

## 2024-03-14 NOTE — PROGRESS NOTES
Occupational Therapy  DATE: 3/14/2024    NAME: Nallely Mcdermott  MRN: [id number]   : 1950    Patient not seen this date for Occupational Therapy due to:      [] Cancel by RN or physician due to:    [] Hemodialysis    [] Critical Lab Value Level     [] Blood transfusion in progress    [] Acute or unstable cardiovascular status   _MAP < 55 or more than >115  _HR < 40 or > 130    [] Acute or unstable pulmonary status   -FiO2 > 60%   _RR < 5 or >40    _O2 sats < 85%    [] Strict Bedrest    [] Off Unit for surgery or procedure    [] Off Unit for testing       [] Pending imaging to R/O fracture    [] Refusal by Patient      [x] Other: Pt presented w/ severe back pain, initial imaging revealed L1-L2 compression fractures. Plan for kyphoplasty on 3/15/2024 per Dr. Garcia's consult note. OT to complete eval following surgery.     [] OT being discontinued at this time. Patient independent. No further needs.     [] OT being discontinued at this time as the patient has been transferred to hospice care. No further needs.      Teri Lucio, OT

## 2024-03-14 NOTE — PLAN OF CARE
Problem: Discharge Planning  Goal: Discharge to home or other facility with appropriate resources  3/14/2024 1008 by Kay Kline RN  Outcome: Progressing  Flowsheets (Taken 3/14/2024 0900)  Discharge to home or other facility with appropriate resources:   Identify barriers to discharge with patient and caregiver   Arrange for needed discharge resources and transportation as appropriate   Identify discharge learning needs (meds, wound care, etc)  3/14/2024 0015 by Christin Jackson RN  Outcome: Progressing     Problem: Pain  Goal: Verbalizes/displays adequate comfort level or baseline comfort level  3/14/2024 1008 by Kay Kline RN  Outcome: Progressing  3/14/2024 0015 by Christin Jackson RN  Outcome: Progressing     Problem: Safety - Adult  Goal: Free from fall injury  3/14/2024 1008 by Kay Kline RN  Outcome: Progressing  3/14/2024 0015 by Christin Jackson RN  Outcome: Progressing     Problem: ABCDS Injury Assessment  Goal: Absence of physical injury  3/14/2024 1008 by Kay Kline RN  Outcome: Progressing  3/14/2024 0015 by Christin Jackson, RN  Outcome: Progressing

## 2024-03-14 NOTE — DISCHARGE INSTRUCTIONS
Walker p.r.n. ambulation.  No spine flexion, no spine lifting for 6 post fracture weeks.   Can shower tomorrow. Dressing can come off tomorrow and leave the incision site open to dry. No dressing changes needed.  Follow up with Dr Garcia in 1 week.

## 2024-03-15 ENCOUNTER — ANESTHESIA (OUTPATIENT)
Dept: OPERATING ROOM | Age: 74
End: 2024-03-15
Payer: MEDICARE

## 2024-03-15 ENCOUNTER — APPOINTMENT (OUTPATIENT)
Dept: GENERAL RADIOLOGY | Age: 74
DRG: 478 | End: 2024-03-15
Attending: ORTHOPAEDIC SURGERY
Payer: MEDICARE

## 2024-03-15 ENCOUNTER — ANESTHESIA EVENT (OUTPATIENT)
Dept: OPERATING ROOM | Age: 74
End: 2024-03-15
Payer: MEDICARE

## 2024-03-15 VITALS
HEART RATE: 82 BPM | WEIGHT: 184.7 LBS | TEMPERATURE: 97.5 F | HEIGHT: 62 IN | RESPIRATION RATE: 15 BRPM | SYSTOLIC BLOOD PRESSURE: 133 MMHG | DIASTOLIC BLOOD PRESSURE: 71 MMHG | BODY MASS INDEX: 33.99 KG/M2 | OXYGEN SATURATION: 97 %

## 2024-03-15 PROBLEM — S32.010A CLOSED COMPRESSION FRACTURE OF L1 LUMBAR VERTEBRA, INITIAL ENCOUNTER (HCC): Status: ACTIVE | Noted: 2024-03-15

## 2024-03-15 PROCEDURE — 2580000003 HC RX 258: Performed by: ORTHOPAEDIC SURGERY

## 2024-03-15 PROCEDURE — 0QB03ZX EXCISION OF LUMBAR VERTEBRA, PERCUTANEOUS APPROACH, DIAGNOSTIC: ICD-10-PCS | Performed by: ORTHOPAEDIC SURGERY

## 2024-03-15 PROCEDURE — 2709999900 HC NON-CHARGEABLE SUPPLY: Performed by: ORTHOPAEDIC SURGERY

## 2024-03-15 PROCEDURE — 7100000001 HC PACU RECOVERY - ADDTL 15 MIN: Performed by: ORTHOPAEDIC SURGERY

## 2024-03-15 PROCEDURE — 3600000002 HC SURGERY LEVEL 2 BASE: Performed by: ORTHOPAEDIC SURGERY

## 2024-03-15 PROCEDURE — 99232 SBSQ HOSP IP/OBS MODERATE 35: CPT | Performed by: INTERNAL MEDICINE

## 2024-03-15 PROCEDURE — 3700000000 HC ANESTHESIA ATTENDED CARE: Performed by: ORTHOPAEDIC SURGERY

## 2024-03-15 PROCEDURE — 6360000002 HC RX W HCPCS: Performed by: SPECIALIST

## 2024-03-15 PROCEDURE — 3600000012 HC SURGERY LEVEL 2 ADDTL 15MIN: Performed by: ORTHOPAEDIC SURGERY

## 2024-03-15 PROCEDURE — 88311 DECALCIFY TISSUE: CPT

## 2024-03-15 PROCEDURE — 97162 PT EVAL MOD COMPLEX 30 MIN: CPT

## 2024-03-15 PROCEDURE — 97530 THERAPEUTIC ACTIVITIES: CPT

## 2024-03-15 PROCEDURE — 88341 IMHCHEM/IMCYTCHM EA ADD ANTB: CPT

## 2024-03-15 PROCEDURE — 88307 TISSUE EXAM BY PATHOLOGIST: CPT

## 2024-03-15 PROCEDURE — C1713 ANCHOR/SCREW BN/BN,TIS/BN: HCPCS | Performed by: ORTHOPAEDIC SURGERY

## 2024-03-15 PROCEDURE — 6370000000 HC RX 637 (ALT 250 FOR IP): Performed by: INTERNAL MEDICINE

## 2024-03-15 PROCEDURE — 2500000003 HC RX 250 WO HCPCS: Performed by: ORTHOPAEDIC SURGERY

## 2024-03-15 PROCEDURE — 2720000010 HC SURG SUPPLY STERILE: Performed by: ORTHOPAEDIC SURGERY

## 2024-03-15 PROCEDURE — 6360000004 HC RX CONTRAST MEDICATION: Performed by: ORTHOPAEDIC SURGERY

## 2024-03-15 PROCEDURE — 0QU03JZ SUPPLEMENT LUMBAR VERTEBRA WITH SYNTHETIC SUBSTITUTE, PERCUTANEOUS APPROACH: ICD-10-PCS | Performed by: ORTHOPAEDIC SURGERY

## 2024-03-15 PROCEDURE — 6370000000 HC RX 637 (ALT 250 FOR IP): Performed by: NURSE PRACTITIONER

## 2024-03-15 PROCEDURE — 7100000000 HC PACU RECOVERY - FIRST 15 MIN: Performed by: ORTHOPAEDIC SURGERY

## 2024-03-15 PROCEDURE — 2500000003 HC RX 250 WO HCPCS: Performed by: SPECIALIST

## 2024-03-15 PROCEDURE — 97166 OT EVAL MOD COMPLEX 45 MIN: CPT

## 2024-03-15 PROCEDURE — 2580000003 HC RX 258: Performed by: SPECIALIST

## 2024-03-15 PROCEDURE — 3700000001 HC ADD 15 MINUTES (ANESTHESIA): Performed by: ORTHOPAEDIC SURGERY

## 2024-03-15 PROCEDURE — 6370000000 HC RX 637 (ALT 250 FOR IP): Performed by: ORTHOPAEDIC SURGERY

## 2024-03-15 PROCEDURE — 1200000000 HC SEMI PRIVATE

## 2024-03-15 PROCEDURE — BR191ZZ FLUOROSCOPY OF LUMBAR SPINE USING LOW OSMOLAR CONTRAST: ICD-10-PCS | Performed by: ORTHOPAEDIC SURGERY

## 2024-03-15 PROCEDURE — 97535 SELF CARE MNGMENT TRAINING: CPT

## 2024-03-15 PROCEDURE — 88342 IMHCHEM/IMCYTCHM 1ST ANTB: CPT

## 2024-03-15 PROCEDURE — C1894 INTRO/SHEATH, NON-LASER: HCPCS | Performed by: ORTHOPAEDIC SURGERY

## 2024-03-15 PROCEDURE — 0QS03ZZ REPOSITION LUMBAR VERTEBRA, PERCUTANEOUS APPROACH: ICD-10-PCS | Performed by: ORTHOPAEDIC SURGERY

## 2024-03-15 DEVICE — CEMENT C01A KYPHX HV-R BONE CEMENT EN
Type: IMPLANTABLE DEVICE | Status: FUNCTIONAL
Brand: KYPHON® HV-R® BONE CEMENT

## 2024-03-15 RX ORDER — ACETAMINOPHEN 500 MG
1000 TABLET ORAL EVERY 8 HOURS SCHEDULED
Status: DISCONTINUED | OUTPATIENT
Start: 2024-03-15 | End: 2024-03-15 | Stop reason: HOSPADM

## 2024-03-15 RX ORDER — ACETAMINOPHEN 325 MG/1
650 TABLET ORAL EVERY 6 HOURS
Status: DISCONTINUED | OUTPATIENT
Start: 2024-03-15 | End: 2024-03-15

## 2024-03-15 RX ORDER — DEXAMETHASONE SODIUM PHOSPHATE 10 MG/ML
INJECTION, SOLUTION INTRAMUSCULAR; INTRAVENOUS PRN
Status: DISCONTINUED | OUTPATIENT
Start: 2024-03-15 | End: 2024-03-15 | Stop reason: SDUPTHER

## 2024-03-15 RX ORDER — ONDANSETRON 4 MG/1
8 TABLET, ORALLY DISINTEGRATING ORAL EVERY 8 HOURS PRN
Status: DISCONTINUED | OUTPATIENT
Start: 2024-03-15 | End: 2024-03-15 | Stop reason: SDUPTHER

## 2024-03-15 RX ORDER — OXYCODONE HYDROCHLORIDE 5 MG/1
10 TABLET ORAL EVERY 4 HOURS PRN
Status: DISCONTINUED | OUTPATIENT
Start: 2024-03-15 | End: 2024-03-15 | Stop reason: HOSPADM

## 2024-03-15 RX ORDER — ONDANSETRON 2 MG/ML
INJECTION INTRAMUSCULAR; INTRAVENOUS PRN
Status: DISCONTINUED | OUTPATIENT
Start: 2024-03-15 | End: 2024-03-15 | Stop reason: SDUPTHER

## 2024-03-15 RX ORDER — SODIUM CHLORIDE 9 MG/ML
INJECTION, SOLUTION INTRAVENOUS PRN
Status: DISCONTINUED | OUTPATIENT
Start: 2024-03-15 | End: 2024-03-15 | Stop reason: HOSPADM

## 2024-03-15 RX ORDER — SODIUM CHLORIDE 0.9 % (FLUSH) 0.9 %
5-40 SYRINGE (ML) INJECTION EVERY 12 HOURS SCHEDULED
Status: DISCONTINUED | OUTPATIENT
Start: 2024-03-15 | End: 2024-03-15 | Stop reason: HOSPADM

## 2024-03-15 RX ORDER — NALOXONE HYDROCHLORIDE 0.4 MG/ML
INJECTION, SOLUTION INTRAMUSCULAR; INTRAVENOUS; SUBCUTANEOUS PRN
Status: DISCONTINUED | OUTPATIENT
Start: 2024-03-15 | End: 2024-03-15 | Stop reason: HOSPADM

## 2024-03-15 RX ORDER — EPHEDRINE SULFATE/0.9% NACL/PF 25 MG/5 ML
SYRINGE (ML) INTRAVENOUS PRN
Status: DISCONTINUED | OUTPATIENT
Start: 2024-03-15 | End: 2024-03-15 | Stop reason: SDUPTHER

## 2024-03-15 RX ORDER — FENTANYL CITRATE 50 UG/ML
INJECTION, SOLUTION INTRAMUSCULAR; INTRAVENOUS PRN
Status: DISCONTINUED | OUTPATIENT
Start: 2024-03-15 | End: 2024-03-15 | Stop reason: SDUPTHER

## 2024-03-15 RX ORDER — CYCLOBENZAPRINE HCL 10 MG
10 TABLET ORAL 3 TIMES DAILY PRN
Qty: 9 TABLET | Refills: 0 | Status: SHIPPED | OUTPATIENT
Start: 2024-03-15 | End: 2024-03-18

## 2024-03-15 RX ORDER — SODIUM CHLORIDE, SODIUM LACTATE, POTASSIUM CHLORIDE, CALCIUM CHLORIDE 600; 310; 30; 20 MG/100ML; MG/100ML; MG/100ML; MG/100ML
INJECTION, SOLUTION INTRAVENOUS CONTINUOUS PRN
Status: DISCONTINUED | OUTPATIENT
Start: 2024-03-15 | End: 2024-03-15 | Stop reason: SDUPTHER

## 2024-03-15 RX ORDER — ROCURONIUM BROMIDE 10 MG/ML
INJECTION, SOLUTION INTRAVENOUS PRN
Status: DISCONTINUED | OUTPATIENT
Start: 2024-03-15 | End: 2024-03-15 | Stop reason: SDUPTHER

## 2024-03-15 RX ORDER — SODIUM CHLORIDE, SODIUM LACTATE, POTASSIUM CHLORIDE, CALCIUM CHLORIDE 600; 310; 30; 20 MG/100ML; MG/100ML; MG/100ML; MG/100ML
INJECTION, SOLUTION INTRAVENOUS CONTINUOUS
Status: DISCONTINUED | OUTPATIENT
Start: 2024-03-15 | End: 2024-03-15

## 2024-03-15 RX ORDER — SODIUM CHLORIDE 0.9 % (FLUSH) 0.9 %
5-40 SYRINGE (ML) INJECTION PRN
Status: DISCONTINUED | OUTPATIENT
Start: 2024-03-15 | End: 2024-03-15 | Stop reason: HOSPADM

## 2024-03-15 RX ORDER — OXYCODONE HYDROCHLORIDE 5 MG/1
5 TABLET ORAL EVERY 6 HOURS PRN
Qty: 12 TABLET | Refills: 0 | Status: SHIPPED | OUTPATIENT
Start: 2024-03-15 | End: 2024-03-18

## 2024-03-15 RX ORDER — CYCLOBENZAPRINE HCL 10 MG
10 TABLET ORAL 3 TIMES DAILY PRN
Status: DISCONTINUED | OUTPATIENT
Start: 2024-03-15 | End: 2024-03-15 | Stop reason: HOSPADM

## 2024-03-15 RX ORDER — FENTANYL CITRATE 50 UG/ML
25 INJECTION, SOLUTION INTRAMUSCULAR; INTRAVENOUS EVERY 5 MIN PRN
Status: DISCONTINUED | OUTPATIENT
Start: 2024-03-15 | End: 2024-03-15 | Stop reason: HOSPADM

## 2024-03-15 RX ORDER — LIDOCAINE HYDROCHLORIDE 20 MG/ML
INJECTION, SOLUTION EPIDURAL; INFILTRATION; INTRACAUDAL; PERINEURAL PRN
Status: DISCONTINUED | OUTPATIENT
Start: 2024-03-15 | End: 2024-03-15 | Stop reason: SDUPTHER

## 2024-03-15 RX ORDER — ONDANSETRON 2 MG/ML
4 INJECTION INTRAMUSCULAR; INTRAVENOUS
Status: DISCONTINUED | OUTPATIENT
Start: 2024-03-15 | End: 2024-03-15 | Stop reason: HOSPADM

## 2024-03-15 RX ORDER — IBANDRONATE SODIUM 150 MG/1
150 TABLET, FILM COATED ORAL
Status: DISCONTINUED | OUTPATIENT
Start: 2024-03-15 | End: 2024-03-15 | Stop reason: HOSPADM

## 2024-03-15 RX ORDER — PROPOFOL 10 MG/ML
INJECTION, EMULSION INTRAVENOUS PRN
Status: DISCONTINUED | OUTPATIENT
Start: 2024-03-15 | End: 2024-03-15 | Stop reason: SDUPTHER

## 2024-03-15 RX ORDER — BUPIVACAINE HYDROCHLORIDE AND EPINEPHRINE 5; 5 MG/ML; UG/ML
INJECTION, SOLUTION EPIDURAL; INTRACAUDAL; PERINEURAL PRN
Status: DISCONTINUED | OUTPATIENT
Start: 2024-03-15 | End: 2024-03-15 | Stop reason: ALTCHOICE

## 2024-03-15 RX ORDER — CEFAZOLIN SODIUM 1 G/3ML
INJECTION, POWDER, FOR SOLUTION INTRAMUSCULAR; INTRAVENOUS PRN
Status: DISCONTINUED | OUTPATIENT
Start: 2024-03-15 | End: 2024-03-15 | Stop reason: SDUPTHER

## 2024-03-15 RX ORDER — OXYCODONE HYDROCHLORIDE 5 MG/1
5 TABLET ORAL EVERY 4 HOURS PRN
Status: DISCONTINUED | OUTPATIENT
Start: 2024-03-15 | End: 2024-03-15 | Stop reason: HOSPADM

## 2024-03-15 RX ORDER — HYDROMORPHONE HYDROCHLORIDE 1 MG/ML
0.5 INJECTION, SOLUTION INTRAMUSCULAR; INTRAVENOUS; SUBCUTANEOUS EVERY 5 MIN PRN
Status: DISCONTINUED | OUTPATIENT
Start: 2024-03-15 | End: 2024-03-15 | Stop reason: HOSPADM

## 2024-03-15 RX ADMIN — SODIUM CHLORIDE, POTASSIUM CHLORIDE, SODIUM LACTATE AND CALCIUM CHLORIDE: 600; 310; 30; 20 INJECTION, SOLUTION INTRAVENOUS at 12:30

## 2024-03-15 RX ADMIN — ONDANSETRON 4 MG: 2 INJECTION INTRAMUSCULAR; INTRAVENOUS at 09:28

## 2024-03-15 RX ADMIN — LIDOCAINE HYDROCHLORIDE 80 MG: 20 INJECTION, SOLUTION EPIDURAL; INFILTRATION; INTRACAUDAL; PERINEURAL at 08:57

## 2024-03-15 RX ADMIN — FENTANYL CITRATE 50 MCG: 50 INJECTION INTRAMUSCULAR; INTRAVENOUS at 08:52

## 2024-03-15 RX ADMIN — ACETAMINOPHEN 650 MG: 325 TABLET ORAL at 12:26

## 2024-03-15 RX ADMIN — EPHEDRINE SULFATE 10 MG: 5 INJECTION INTRAVENOUS at 09:12

## 2024-03-15 RX ADMIN — FENTANYL CITRATE 50 MCG: 50 INJECTION INTRAMUSCULAR; INTRAVENOUS at 08:57

## 2024-03-15 RX ADMIN — SUGAMMADEX 200 MG: 100 INJECTION, SOLUTION INTRAVENOUS at 09:30

## 2024-03-15 RX ADMIN — DEXAMETHASONE SODIUM PHOSPHATE 10 MG: 10 INJECTION, SOLUTION INTRAMUSCULAR; INTRAVENOUS at 09:19

## 2024-03-15 RX ADMIN — HYDROCODONE BITARTRATE AND ACETAMINOPHEN 1 TABLET: 5; 325 TABLET ORAL at 05:14

## 2024-03-15 RX ADMIN — OXYCODONE HYDROCHLORIDE 5 MG: 5 TABLET ORAL at 13:04

## 2024-03-15 RX ADMIN — EPHEDRINE SULFATE 10 MG: 5 INJECTION INTRAVENOUS at 09:15

## 2024-03-15 RX ADMIN — ROCURONIUM BROMIDE 40 MG: 10 INJECTION, SOLUTION INTRAVENOUS at 08:57

## 2024-03-15 RX ADMIN — PROPOFOL 100 MG: 10 INJECTION, EMULSION INTRAVENOUS at 08:57

## 2024-03-15 RX ADMIN — SODIUM CHLORIDE, POTASSIUM CHLORIDE, SODIUM LACTATE AND CALCIUM CHLORIDE: 600; 310; 30; 20 INJECTION, SOLUTION INTRAVENOUS at 08:47

## 2024-03-15 RX ADMIN — CEFAZOLIN 2 G: 1 INJECTION, POWDER, FOR SOLUTION INTRAMUSCULAR; INTRAVENOUS at 09:12

## 2024-03-15 ASSESSMENT — PAIN DESCRIPTION - LOCATION
LOCATION: BACK

## 2024-03-15 ASSESSMENT — PAIN DESCRIPTION - DIRECTION
RADIATING_TOWARDS: DOWN LEFT LEG
RADIATING_TOWARDS: DOWN LEFT LEG

## 2024-03-15 ASSESSMENT — PAIN SCALES - GENERAL
PAINLEVEL_OUTOF10: 3
PAINLEVEL_OUTOF10: 6
PAINLEVEL_OUTOF10: 4
PAINLEVEL_OUTOF10: 2
PAINLEVEL_OUTOF10: 5
PAINLEVEL_OUTOF10: 2
PAINLEVEL_OUTOF10: 10
PAINLEVEL_OUTOF10: 5
PAINLEVEL_OUTOF10: 2
PAINLEVEL_OUTOF10: 2

## 2024-03-15 ASSESSMENT — PAIN DESCRIPTION - ORIENTATION
ORIENTATION: LOWER
ORIENTATION: LEFT

## 2024-03-15 ASSESSMENT — PAIN DESCRIPTION - ONSET
ONSET: ON-GOING
ONSET: ON-GOING

## 2024-03-15 ASSESSMENT — PAIN DESCRIPTION - PAIN TYPE
TYPE: SURGICAL PAIN
TYPE: SURGICAL PAIN

## 2024-03-15 ASSESSMENT — PAIN DESCRIPTION - FREQUENCY
FREQUENCY: CONTINUOUS
FREQUENCY: CONTINUOUS

## 2024-03-15 ASSESSMENT — PAIN DESCRIPTION - DESCRIPTORS
DESCRIPTORS: ACHING
DESCRIPTORS: NUMBNESS;SORE
DESCRIPTORS: ACHING

## 2024-03-15 ASSESSMENT — PAIN - FUNCTIONAL ASSESSMENT
PAIN_FUNCTIONAL_ASSESSMENT: FACE, LEGS, ACTIVITY, CRY, AND CONSOLABILITY (FLACC)
PAIN_FUNCTIONAL_ASSESSMENT: PREVENTS OR INTERFERES SOME ACTIVE ACTIVITIES AND ADLS
PAIN_FUNCTIONAL_ASSESSMENT: PREVENTS OR INTERFERES SOME ACTIVE ACTIVITIES AND ADLS

## 2024-03-15 NOTE — PROGRESS NOTES
Patient doing really well. Worked well with therapy. Pain was at 5/10 so writer medicated with Roxicodone. Voiding well and tolerating diet. Back dressing clean, dry, and intact. Will continue to monitor the patient.

## 2024-03-15 NOTE — ANESTHESIA POSTPROCEDURE EVALUATION
Department of Anesthesiology  Postprocedure Note    Patient: Nallely Mcdermott  MRN: [id number]  YOB: 1950  Date of evaluation: 3/15/2024    Procedure Summary       Date: 03/15/24 Room / Location: [address]    Anesthesia Start: 0852 Anesthesia Stop: 0949    Procedure: KYPHOPLASTY  L1-2 (Back) Diagnosis:       Compression fracture of L1 vertebra, initial encounter (HCC)      Compression fracture of L2 vertebra, initial encounter (HCC)      (Compression fracture of L1 vertebra, initial encounter (HCC) [S32.010A])      (Compression fracture of L2 vertebra, initial encounter (HCC) [S32.020A])    Surgeons: Jonathan Garcia MD Responsible Provider: Иван Velasco DO    Anesthesia Type: general ASA Status: 3            Anesthesia Type: No value filed.    Bridger Phase I: Bridger Score: 5    Bridger Phase II:      Anesthesia Post Evaluation    Patient location during evaluation: PACU  Patient participation: complete - patient participated  Level of consciousness: awake and alert  Airway patency: patent  Nausea & Vomiting: no nausea and no vomiting  Cardiovascular status: hemodynamically stable  Respiratory status: acceptable  Hydration status: stable  Pain management: adequate    No notable events documented.

## 2024-03-15 NOTE — PROGRESS NOTES
Nallely Mcdermott was evaluated today and a DME order was entered for a wheeled walker with seat because she requires this to successfully complete daily living tasks of eating, bathing, toileting, personal cares, ambulating, grooming, and hygiene.  A wheeled walker with seat is necessary due to the patient's unsteady gait, upper body weakness, inability to  and ambulation device, ambulating only short distances by pushing a walker, and the need to sit for a short time before resuming ambulation.  These tasks cannot be completed with a lesser ambulation device such as a cane, crutch, or standard walker.  The need for this equipment was discussed with the patient and she understands and is in agreement.

## 2024-03-15 NOTE — PROGRESS NOTES
[facility]  Office: [phone]  Larry Salguero DO, Maurice Arcos DO, Timmy Blue DO, Evan Parrish DO, Sergei Fernandez MD, Paula Sahu MD, Burt Marcos MD, Bria Lopez MD,  Jim Alvarado MD, Ken Alberts MD, Laurel Yu MD,  Corrina Delacruz DO, Malka Pink MD, Maurice Enriquez MD, Jerry Salguero DO, Susan Nguyen MD,  Luciano Delgado DO, Antonella Gamino MD, Raiza Calabrese MD, Marilee Trinh MD, Marylin Mcclure MD,  Davey Javier MD, Aravind Claros MD, Gabriel Lraa MD, Le Negro MD, Toni Marcelo MD, Cyn Saldaña MD, Mark Osborne DO, Saeid Posada DO, Mayra Torres MD,  Agustin Clay MD, Shirley Waterhouse, CNP,  Lynsey Ley, CNP, Tio Wilcox, CNP,  Irma Olivares, DNP, Clementina Martines, CNP, Shirley Chowdhury, CNP, Jenny Sykes CNP, Zainab Car, CNP, Kori Moore, CNP, Patt Pat, PA-C, Lu Heller, PA-C, Cherie Fermin, CNP, Shannan Hugo, CNP, Jaleesa Rowland, CNP, Edita Velasquez, CNS, Bernice Álvarez, CNP, Mulu Mccormick, CNP, Tracy Schwab, CNP         [facility]   IN-PATIENT SERVICE   [facility]    Progress Note    3/15/2024    1:54 PM    Name:   Nallely Mcdermott  MRN:     [id number]     Acct:      [id number]   Room:   2011/2011-02  IP Day:  0  Admit Date:  3/13/2024 11:58 AM    PCP:   Anjum Marcos MD  Code Status:  Full Code    Subjective:     Pt feeling better post operatively.  Says her pain is well-controlled.  Still not ambulating.      Brief History:     73-year-old female who presented to the hospital for evaluation and treatment of a closed fracture of her lumbar spine.  Patient initially developed back pain and inability to ambulate.  MRI of her lumbar spine showed acute/subacute compression deformity involving the inferior endplate of L1 with 35% loss of height and bone marrow edema with minimal superior endplate depression of the L2 vertebra.  She underwent kyphoplasty of L1-L2 with biopsy.  Her pain did  \"PROT\", \"LABALBU\", \"LABA1C\", \"A1GJNGY\", \"C1YAQDJ\", \"FT4\", \"TSH\", \"AST\", \"ALT\", \"LDH\", \"GGT\", \"ALKPHOS\", \"LABGGT\", \"BILITOT\", \"BILIDIR\", \"AMMONIA\", \"AMYLASE\", \"LIPASE\", \"LACTATE\", \"CHOL\", \"HDL\", \"LDLCHOLESTEROL\", \"CHOLHDLRATIO\", \"TRIG\", \"VLDL\", \"XDS62LG\", \"PHENYTOIN\", \"PHENYF\", \"URICACID\", \"POCGLU\" in the last 72 hours.  ABG:No results found for: \"POCPH\", \"PHART\", \"PH\", \"POCPCO2\", \"VNY4JMZ\", \"PCO2\", \"POCPO2\", \"PO2ART\", \"PO2\", \"POCHCO3\", \"PXB5JSG\", \"HCO3\", \"NBEA\", \"PBEA\", \"BEART\", \"BE\", \"THGBART\", \"THB\", \"ITC0ZCD\", \"YTFX4JOB\", \"C6CILBDU\", \"O2SAT\", \"FIO2\"  No results found for: \"SPECIAL\"  Lab Results   Component Value Date/Time    CULTURE ESCHERICHIA COLI >[id number] CFU/ML (A) 03/29/2022 02:34 PM       Radiology:  CT FEMUR LEFT WO CONTRAST    Result Date: 3/14/2024  The bones appear demineralized without CT evidence of acute osseous abnormality of the left femur seen. RECOMMENDATIONS: If there is a clinical concern for occult hip fracture, MRI is recommended for further evaluation.     MRI THORACIC SPINE WO CONTRAST    Result Date: 3/14/2024  1. No acute abnormality seen of the unenhanced thoracic spine. 2. There is T2 hyperintensity within the central cord spanning the T6 through T8 levels measuring up to 3 mm in AP dimension, which may represent a small syrinx. 3. Degenerative changes of the lumbar spine contribute to mild-to-moderate spinal canal stenosis at T11-T12 with minimal stenosis at T8-T9. 4. Multilevel neural foraminal narrowing as above.     MRI LUMBAR SPINE WO CONTRAST    Result Date: 3/12/2024  1. Acute to subacute compression deformity involving the inferior endplate of L1 with 35% loss of height.  No significant bulging of the posterior cortex. 2. There is bone marrow edema with minimal superior endplate depression of the L2 vertebral body. 3. Degenerative changes contribute to moderate to severe spinal canal stenosis at L1-L2 with moderate stenosis at T11-T12, L2-L3 and L4-L5. 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  Did not recommend stenting.  Patient has good collaterals, no abdominal pain    Medical Decision Making: Medium    Dispo: hopeful for d/c after she works with PT  Corrina Delacruz,   3/15/2024  1:54 PM

## 2024-03-15 NOTE — PLAN OF CARE
[facility]  Office: [phone]  Larry Salguero DO, Maurice Arcos DO, Timmy Blue DO, Evan Parrish DO, Sergei Fernandez MD, Paula Sahu MD, Burt Marcos MD, Bria Lopez MD,  Jim Alvarado MD, Ken Alberts MD, Laurel Yu MD,  Corrina Delacruz DO, Malka Pink MD, Maurice Enriquez MD, Jerry Salguero DO, Susan Nguyen MD,  Luciano Delgado DO, Antonella Gamino MD, Raiza Calabrese MD, Marilee Trinh MD, Marylin Mcclure MD,  Davey Javier MD, Aravind Claros MD, Gabriel Lara MD, Le Negro MD, Toni Marcelo MD, Cyn Saldaña MD, Mark Osborne DO, Saeid Posada DO, Mayra Torres MD,  Agustin Clay MD, Shirley Waterhouse, CNP,  Lynsey Ley, CNP, Tio Wilcox, CNP,  Irma Olivares, DNP, Clementina Martines, CNP, Shirley Chowdhury, CNP, Jenny Sykes CNP, Zianab Car, CNP, Kori Moore, CNP, Patt Pat, PA-C, Lu Heller, PA-C, Cherie Fermin, CNP, Shannan Hugo, CNP, Jaleesa Rowland, CNP, Edita Velasquez, CNS, Bernice Álvarez, CNP, Mulu Mccormick, CNP, Tracy Schwab, CNP         [facility]   IN-PATIENT SERVICE   [facility]    Second Visit Note  For more detailed information please refer to the progress note of the day      3/15/2024    5:42 PM    Name:   Nallely Mcdermott  MRN:     [id number]     Acct:      [id number]   Room:   2011/2011-02  IP Day:  0  Admit Date:  3/13/2024 11:58 AM    PCP:   Anjum Marcos MD  Code Status:  Full Code      Pt vitals were reviewed   New labs were reviewed   Patient was seen    Updated plan :     Reevaluated patient   She was ambulating around the hospital and is eager to go home  Will discharge      Corrina Delacruz DO  3/15/2024  5:42 PM
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
## 2024-03-15 NOTE — PLAN OF CARE
Problem: Discharge Planning  Goal: Discharge to home or other facility with appropriate resources  3/14/2024 2012 by Christin Jackson RN  Outcome: Progressing  3/14/2024 1008 by Kay Kline RN  Outcome: Progressing  Flowsheets (Taken 3/14/2024 0900)  Discharge to home or other facility with appropriate resources:   Identify barriers to discharge with patient and caregiver   Arrange for needed discharge resources and transportation as appropriate   Identify discharge learning needs (meds, wound care, etc)     Problem: Pain  Goal: Verbalizes/displays adequate comfort level or baseline comfort level  3/14/2024 2012 by Christin Jackson, RN  Outcome: Progressing  3/14/2024 1008 by Kay Kline RN  Outcome: Progressing     Problem: Safety - Adult  Goal: Free from fall injury  3/14/2024 2012 by Christin Jackson, RN  Outcome: Progressing  3/14/2024 1008 by Kay Kline RN  Outcome: Progressing     Problem: ABCDS Injury Assessment  Goal: Absence of physical injury  3/14/2024 2012 by Christin Jackson, RN  Outcome: Progressing  3/14/2024 1008 by Kay Kline RN  Outcome: Progressing

## 2024-03-15 NOTE — PROGRESS NOTES
Physical Therapy  Facility/Department: [facility] MED SURG  Physical Therapy Initial Assessment    Name: Nallely Mcdermott  : 1950  MRN: [id number]  Date of Service: 3/15/2024    Per H&P:73 y.o. Non- / non  female who presents with Back Pain (Had a MRI of back on Monday ordered by ortho seeing patient, pain in back has become intolerable, patient advised to present to ED. ) and is admitted to the hospital for the management of Closed fracture of lumbar spine without lesion of spinal cord, initial encounter ([facility]).  Patient reports to emergency department with intractable back pain resulting in ambulatory dysfunction.  Patient reports that she has had longstanding mid back pain for which she is seeing an orthopedic specialist who ordered an outpatient MRI.  Yesterday afternoon patient's pain became so severe that she was unable to ambulate or complete simple ADLs.  She reported to the emergency department today where she is found to have a compression fracture.  CMS is intact to the lower extremities.  Health history is reviewed with the patient and she has a recent diagnosis of lymphoma which is believed to be in remission.  She is also obese, of advanced age, and had natural childbirth putting her at risk for osteoporosis.     Date of Procedure: 3/15/2024  Pre-Op Diagnosis Codes:     * Compression fracture of L1 vertebra, initial encounter ([facility]) [S32.010A]     * Compression fracture of L2 vertebra, initial encounter ([facility]) [S32.020A]  Post-Op Diagnosis: Same  Procedure(s):  KYPHOPLASTY  L1-2; biopsy; ap lat fluoro L spine  Surgeon(s):  Jonathan Garcia MD  Assistant:  * No surgical staff found *  Anesthesia: Monitor Anesthesia Care  Estimated Blood Loss (mL): Minimal  Complications: None    HAVEN Villanueva reports patient is medically stable for therapy treatment this date.    Chart reviewed prior to treatment and patient is agreeable for therapy.  All lines intact and patient positioned comfortably at end of  Demonstration  Barriers to Learning: None  Education Outcome: Verbalized understanding      Therapy Time   Individual Concurrent Group Co-treatment   Time In 1242         Time Out 1315         Minutes 33 + 10 = 43 minutes          Additional 10 minutes for chart review.   Treatment Time: 20 minutes       Diana Romano PT

## 2024-03-15 NOTE — PROGRESS NOTES
Patient discharged via wheelchair to home with all her belongings in stable condition. Patient understood and acknowledged AVS.

## 2024-03-15 NOTE — ANESTHESIA PRE PROCEDURE
Department of Anesthesiology  Preprocedure Note       Name:  Nallely Mcdermott   Age:  73 y.o.  :  1950                                          MRN:  [id number]         Date:  3/15/2024      Surgeon: Surgeon(s):  Jonathan Garcia MD    Procedure: Procedure(s):  KYPHOPLASTY  L1-2    Medications prior to admission:   Prior to Admission medications    Medication Sig Start Date End Date Taking? Authorizing Provider   diazePAM (VALIUM) 5 MG tablet Take 1 tablet 1-2 hours prior to the start of the lumbar MRI. Patient will require transportation to and from the MRI appointment.  Patient not taking: Reported on 3/13/2024 3/6/24 3/16/24  Darwin Roth MD   atorvastatin (LIPITOR) 20 MG tablet take 1 tablet by mouth once daily 24   Anjum Marcos MD   omeprazole (PRILOSEC) 20 MG delayed release capsule take 1 capsule by mouth once daily BEFORE A MEAL 24   Cali Benito MD Handicap Placard MISC by Does not apply route Expires on 27   Anjum Marcos MD   ibandronate (BONIVA) 150 MG tablet Take 1 tablet by mouth every 30 days Take one (1) tablet once per month in the morning with a full glass of water, on an empty stomach, and do not take anything else by mouth or lie down for the next 60 minutes. 24   Anjum Marcos MD Handicap Placard MISC by Does not apply route Expires on 23   Anjum Marcos MD   HYDROcodone-acetaminophen (NORCO) 5-325 MG per tablet Take 1 tablet by mouth 2 times daily as needed for Pain.  Patient not taking: Reported on 3/13/2024    Yumiko Cortez MD   vitamin D3 (CHOLECALCIFEROL) 25 MCG (1000 UT) TABS tablet Take 1 tablet by mouth daily 23   Cali Benito MD   ondansetron (ZOFRAN ODT) 8 MG TBDP disintegrating tablet Take 1 tablet by mouth every 8 hours as needed for Nausea or Vomiting  Patient not taking: Reported on 3/13/2024 1/23/23   Cali Benito MD   Multiple Vitamin (MULTI VITAMIN PO) Take by mouth    Yumiko Cortez MD

## 2024-03-15 NOTE — DISCHARGE SUMMARY
is recommended for further evaluation.     MRI THORACIC SPINE WO CONTRAST    Result Date: 3/14/2024  1. No acute abnormality seen of the unenhanced thoracic spine. 2. There is T2 hyperintensity within the central cord spanning the T6 through T8 levels measuring up to 3 mm in AP dimension, which may represent a small syrinx. 3. Degenerative changes of the lumbar spine contribute to mild-to-moderate spinal canal stenosis at T11-T12 with minimal stenosis at T8-T9. 4. Multilevel neural foraminal narrowing as above.     MRI LUMBAR SPINE WO CONTRAST    Result Date: 3/12/2024  1. Acute to subacute compression deformity involving the inferior endplate of L1 with 35% loss of height.  No significant bulging of the posterior cortex. 2. There is bone marrow edema with minimal superior endplate depression of the L2 vertebral body. 3. Degenerative changes contribute to moderate to severe spinal canal stenosis at L1-L2 with moderate stenosis at T11-T12, L2-L3 and L4-L5. Mild-to-moderate spinal canal stenosis at T12-L1 and L3-L4. 4. Multilevel neural foraminal narrowing as above.       Consultations:    Consults:     Final Specialist Recommendations/Findings:   IP CONSULT TO ORTHOPEDIC SURGERY  IP CONSULT TO ORTHOPEDIC SURGERY  IP CONSULT TO HOSPITALIST  IP CONSULT TO ORTHOPEDIC SURGERY  IP CONSULT TO SOCIAL WORK      The patient was seen and examined on day of discharge and this discharge summary is in conjunction with any daily progress note from day of discharge.    Discharge plan:     Disposition: Home    Physician Follow Up:     Jonathan Garcia MD  [address] [id number]  [phone]    Follow up in 6 week(s)  walker p.r.n. ambulation.  No spine flexion, no spine lifting for 6 post fracture weeks.    Follow-up, For wound re-check, preclinic AP lateral x-rays thoracic, lumbar spine 6 weeks postop with appointment Dr. Garcia       Requiring Further Evaluation/Follow Up POST HOSPITALIZATION/Incidental  Findings: follow up with  . Follow up with  for management of chronic pain. Follow up with PCP    Diet: regular diet and cardiac diet    Activity: As tolerated    Instructions to Patient: see above    Discharge Medications:      Medication List        START taking these medications      cyclobenzaprine 10 MG tablet  Commonly known as: FLEXERIL  Take 1 tablet by mouth 3 times daily as needed for Muscle spasms     oxyCODONE 5 MG immediate release tablet  Commonly known as: Roxicodone  Take 1 tablet by mouth every 6 hours as needed for Pain for up to 3 days. Intended supply: 5 days. Take lowest dose possible to manage pain Max Daily Amount: 20 mg            CONTINUE taking these medications      atorvastatin 20 MG tablet  Commonly known as: LIPITOR  take 1 tablet by mouth once daily     Handicap Placard Misc  by Does not apply route Expires on 12/27     Handicap Placard Misc  by Does not apply route Expires on 12/31/27     ibandronate 150 MG tablet  Commonly known as: Boniva  Take 1 tablet by mouth every 30 days Take one (1) tablet once per month in the morning with a full glass of water, on an empty stomach, and do not take anything else by mouth or lie down for the next 60 minutes.     MULTI VITAMIN PO     omeprazole 20 MG delayed release capsule  Commonly known as: PRILOSEC  take 1 capsule by mouth once daily BEFORE A MEAL     vitamin D3 25 MCG (1000 UT) Tabs tablet  Generic drug: vitamin D  Take 1 tablet by mouth daily            STOP taking these medications      diazePAM 5 MG tablet  Commonly known as: Valium     HYDROcodone-acetaminophen 5-325 MG per tablet  Commonly known as: NORCO     ondansetron 8 MG Tbdp disintegrating tablet  Commonly known as: Zofran ODT               Where to Get Your Medications        Information about where to get these medications is not yet available    Ask your nurse or doctor about these medications  cyclobenzaprine 10 MG tablet  oxyCODONE 5 MG immediate release  tablet         No discharge procedures on file.    Time Spent on discharge is  34 mins in patient examination, evaluation, counseling as well as medication reconciliation, prescriptions for required medications, discharge plan and follow up.    Electronically signed by   Corrina Delacruz DO  3/15/2024  5:47 PM      Thank you Anjum Redd MD for the opportunity to be involved in this patient's care.

## 2024-03-15 NOTE — CARE COORDINATION
DC Planning    Pt is post kypho. Spoke with pt plans for home at dc with sister and niece, discussed [facility]-pt declines does not think she will need. She does need a walker. PS to attending. She does have one she can probably borrow if unable to obtain over the weekend.

## 2024-03-15 NOTE — BRIEF OP NOTE
Brief Postoperative Note      Patient: Nallely Mcdermott  YOB: 1950  MRN: [id number]    Date of Procedure: 3/15/2024    Pre-Op Diagnosis Codes:     * Compression fracture of L1 vertebra, initial encounter ([facility]) [S32.010A]     * Compression fracture of L2 vertebra, initial encounter ([facility]) [S32.020A]    Post-Op Diagnosis: Same       Procedure(s):  KYPHOPLASTY  L1-2; biopsy; ap lat fluoro L spine    Surgeon(s):  Jonathan Garcia MD    Assistant:  * No surgical staff found *    Anesthesia: Monitor Anesthesia Care    Estimated Blood Loss (mL): Minimal    Complications: None    Specimens:   ID Type Source Tests Collected by Time Destination   A : L1 and L2 vertebral body biopsy Bone Spine SURGICAL PATHOLOGY Jonathan Garcia MD 3/15/2024 0919        Implants:  Implant Name Type Inv. Item Serial No.  Lot No. LRB No. Used Action   CEMENT BNE HI VISC RADIOPAQUE KYPHON HV-R - [id number]  CEMENT BNE HI VISC RADIOPAQUE KYPHON HV-R  [employer] SOFAM[employer] DANEK-WD [id number] N/A 1 Implanted         Drains: * No LDAs found *    Findings: see dictation      Electronically signed by Jonathan Garcia MD on 3/15/2024 at 9:43 AM

## 2024-03-15 NOTE — PLAN OF CARE
Problem: Discharge Planning  Goal: Discharge to home or other facility with appropriate resources  Outcome: Progressing  Flowsheets  Taken 3/15/2024 1202 by Cuco Alonzo  Discharge to home or other facility with appropriate resources:   Identify barriers to discharge with patient and caregiver   Identify discharge learning needs (meds, wound care, etc)   Arrange for needed discharge resources and transportation as appropriate   Refer to discharge planning if patient needs post-hospital services based on physician order or complex needs related to functional status, cognitive ability or social support system  Taken 3/15/2024 0740 by Cuco Alonzo  Discharge to home or other facility with appropriate resources:   Identify barriers to discharge with patient and caregiver   Arrange for needed discharge resources and transportation as appropriate   Identify discharge learning needs (meds, wound care, etc)   Refer to discharge planning if patient needs post-hospital services based on physician order or complex needs related to functional status, cognitive ability or social support system     Problem: Pain  Goal: Verbalizes/displays adequate comfort level or baseline comfort level  Outcome: Progressing     Problem: Safety - Adult  Goal: Free from fall injury  Outcome: Progressing     Problem: ABCDS Injury Assessment  Goal: Absence of physical injury  Outcome: Progressing

## 2024-03-15 NOTE — CARE COORDINATION
DC Planning    Pt has [employer] Gold Plan-only insurance in network is [employer] ([employer]) they are closed (closed at 4pm). They open  Monday [phone]. [address], [place] [address]. Informed pt-she does have multiple family members in which she borrow a walker.     WILL NEED TO FAX ORDER FOR WALKER AND F2F WHEN OBTAINED TO [employer].

## 2024-03-15 NOTE — PROGRESS NOTES
Tolerated ambulation very well in the hallway. Pain at 3/10. Eating and drinking well. Patient said she has a borrowed walker at home for now and that she can wait until the other walker gets delivered.

## 2024-03-15 NOTE — OP NOTE
[place] [address]                            OPERATIVE REPORT      PATIENT NAME: MAICO OSEI               : 1950  MED REC NO: [id number]                         ROOM:   ACCOUNT NO: [id number]                       ADMIT DATE: 2024  PROVIDER: Jonathan Garcia MD      DATE OF PROCEDURE:      SURGEON:  Jonathan Garcia MD    PREOPERATIVE DIAGNOSIS:  Pathological osteoporotic L1, 2 compression fractures.    POSTOPERATIVE DIAGNOSIS:  Pathological osteoporotic L1, 2 compression fractures.    PROCEDURES:    1. Kyphoplasty and vertebral augmentation of pathological L1, 2 compression fractures.  2. Biopsy of pathological L1, 2 compression fractures.  3. AP and lateral fluoroscopy of lumbar spine.    ESTIMATED BLOOD LOSS:  5 mL.    INDICATIONS:  The patient is a 73-year-old, who denies trauma, but has intractable spine pain.  Her initial evaluation was by Dr. Roth, who identified degenerative spondylolisthesis, L4-5 along with a L1 compression fracture.  The patient, because of the intensity of the pain, elected to present to [facility]'s ER, where CT imaging confirmed a superior endplate compression fracture, T12.  She required admission for the intractable pain.  This was followed by MRI of the thoracolumbar spine.  I was asked to see her orthopedically.  The MRI imaging confirmed a L1 fracture, superior endplate, but also a nondisplaced fracture involving the superior endplate of the caudal L2 segment.  Treatment options were discussed.  Nonoperative care via TLSO, pain control versus surgery.  Kyphoplasty, vertebral augmentation, biopsy were recommended to decrease pain, decrease kyphotic deformity, eliminate the need for spinal orthosis, and obtain tissue diagnosis.  Risks of the procedure including infection, phlebitis, neurovascular or tendon injuries, anesthetic complications, amongst others were discussed and

## 2024-03-15 NOTE — PROGRESS NOTES
Occupational Therapy  DATE: 3/15/2024    NAME: Nallely Mcdermott  MRN: [id number]   : 1950    Patient not seen this date for Occupational Therapy due to:      [] Cancel by RN or physician due to:    [] Hemodialysis    [] Critical Lab Value Level     [] Blood transfusion in progress    [] Acute or unstable cardiovascular status   _MAP < 55 or more than >115  _HR < 40 or > 130    [] Acute or unstable pulmonary status   -FiO2 > 60%   _RR < 5 or >40    _O2 sats < 85%    [] Strict Bedrest    [] Off Unit for surgery or procedure    [] Off Unit for testing       [] Pending imaging to R/O fracture    [] Refusal by Patient      [x] Other-3/15 hold eval as pt to have sx-continue to follow       [] PT being discontinued at this time. Patient independent. No further needs.     [] PT being discontinued at this time as the patient has been transferred to hospice care. No further needs.      Ethel Genao, OT

## 2024-03-16 NOTE — CARE COORDINATION
3/16/24  1022- DME order and face to face document for walker faxed to [facility] with request to be delivered to patient's home.

## 2024-03-17 NOTE — OP NOTE
[place] [address]                            OPERATIVE REPORT      PATIENT NAME: MAICO OSEI               : 1950  MED REC NO: [id number]                         ROOM:   ACCOUNT NO: [id number]                       ADMIT DATE: 2024  PROVIDER: Jonathan Garcia MD      DATE OF PROCEDURE:  03/15/2024    SURGEON:  Jonathan Garcia MD    PREOPERATIVE DIAGNOSIS:  Pathological osteoporotic L1, 2 compression fracture.    POSTOPERATIVE DIAGNOSIS:  Pathological osteoporotic L1, 2 compression fracture.    PROCEDURES:    1. Kyphoplasty and vertebral augmentation of pathological L1, 2 compression fracture.  2. Biopsy of pathological L1, 2 compression fracture.  3. AP and lateral fluoroscopy of lumbar spine.    ESTIMATED BLOOD LOSS:  5 cc.    INDICATION:  The patient is a 73-year-old with intractable spine pain, attributed to acute fragility fractures.  L1-L2 has been seen on MRI with low signal T1, high signal STIR changes.  Outpatient evaluation care included pain medications, restricted activities, and discussion of spinal orthosis.  Kyphoplasty, vertebral augmentation, biopsy were recommended to decrease pain, decrease kyphotic deformity, eliminate the need for spinal orthosis and obtain tissue diagnosis.  Risks of the procedure including infection, phlebitis, neurovascular or tendon injuries, anesthetic complications, failure of procedure, new fractures amongst others were discussed and accepted.    DESCRIPTION OF PROCEDURE:  The patient was brought to the operative room and given a general anesthetic.  She was given prophylactic antibiotics per SCIP protocol.  Carefully rotated to the prone position on a well-padded Osmel frame.  Two C-arms were brought in.  Two AP and 2 lateral images were obtained of the L1-2 fracture sites.  The spine was therefore scrubbed, prepped, and draped in routine sterile fashion with

## 2024-03-18 ENCOUNTER — TELEPHONE (OUTPATIENT)
Dept: FAMILY MEDICINE CLINIC | Age: 74
End: 2024-03-18

## 2024-03-18 NOTE — TELEPHONE ENCOUNTER
Care Transitions Initial Follow Up Call    Outreach made within 2 business days of discharge: Yes    Patient: Nallely Mcdermott Patient : 1950   MRN: [id number]    Reason for Admission: Closed fracture of lumbar spine without lesion of spinal   Discharge Date: 3/15/24       Spoke with: LEFT MESSAGE     Discharge department/facility: [facility]        TCM Interactive Patient Contact:  Was patient able to fill all prescriptions:   Was patient instructed to bring all medications to the follow-up visit:   Is patient taking all medications as directed in the discharge summary?   Does patient understand their discharge instructions:   Does patient have questions or concerns that need addressed prior to 7-14 day follow up office visit:       Follow Up  Future Appointments   Date Time Provider Department Center   2024 10:00 AM Cali Benito MD [facility]   2024  9:45 AM Anjum Marcos MD [facility]       Nelida Moss MA

## 2024-03-19 LAB — SURGICAL PATHOLOGY REPORT: NORMAL

## 2024-03-20 DIAGNOSIS — Z51.11 ENCOUNTER FOR CHEMOTHERAPY MANAGEMENT: ICD-10-CM

## 2024-03-20 DIAGNOSIS — D69.6 THROMBOCYTOPENIA, UNSPECIFIED (HCC): ICD-10-CM

## 2024-03-20 DIAGNOSIS — C83.33 DIFFUSE LARGE B-CELL LYMPHOMA OF INTRA-ABDOMINAL LYMPH NODES (HCC): ICD-10-CM

## 2024-03-20 DIAGNOSIS — G89.3 CANCER ASSOCIATED PAIN: ICD-10-CM

## 2024-03-22 RX ORDER — HYDROCODONE BITARTRATE AND ACETAMINOPHEN 5; 325 MG/1; MG/1
1 TABLET ORAL EVERY 8 HOURS PRN
Qty: 90 TABLET | Refills: 0 | Status: SHIPPED | OUTPATIENT
Start: 2024-03-22 | End: 2024-04-21

## 2024-04-03 DIAGNOSIS — Z51.11 ENCOUNTER FOR CHEMOTHERAPY MANAGEMENT: ICD-10-CM

## 2024-04-03 DIAGNOSIS — D69.6 THROMBOCYTOPENIA, UNSPECIFIED (HCC): ICD-10-CM

## 2024-04-03 DIAGNOSIS — C83.33 DIFFUSE LARGE B-CELL LYMPHOMA OF INTRA-ABDOMINAL LYMPH NODES (HCC): ICD-10-CM

## 2024-04-03 DIAGNOSIS — G89.3 CANCER ASSOCIATED PAIN: ICD-10-CM

## 2024-04-03 RX ORDER — OMEPRAZOLE 20 MG/1
CAPSULE, DELAYED RELEASE ORAL
Qty: 90 CAPSULE | Refills: 2 | Status: SHIPPED | OUTPATIENT
Start: 2024-04-03

## 2024-04-08 ENCOUNTER — OFFICE VISIT (OUTPATIENT)
Dept: ONCOLOGY | Age: 74
End: 2024-04-08
Payer: MEDICARE

## 2024-04-08 ENCOUNTER — HOSPITAL ENCOUNTER (OUTPATIENT)
Age: 74
Setting detail: SPECIMEN
Discharge: HOME | End: 2024-04-08
Payer: MEDICARE

## 2024-04-08 ENCOUNTER — TELEPHONE (OUTPATIENT)
Dept: ONCOLOGY | Age: 74
End: 2024-04-08

## 2024-04-08 VITALS
RESPIRATION RATE: 16 BRPM | SYSTOLIC BLOOD PRESSURE: 162 MMHG | BODY MASS INDEX: 33.4 KG/M2 | WEIGHT: 182.6 LBS | DIASTOLIC BLOOD PRESSURE: 90 MMHG | TEMPERATURE: 97.3 F | HEART RATE: 98 BPM

## 2024-04-08 DIAGNOSIS — C83.33 DIFFUSE LARGE B-CELL LYMPHOMA OF INTRA-ABDOMINAL LYMPH NODES (HCC): ICD-10-CM

## 2024-04-08 DIAGNOSIS — G89.3 CANCER ASSOCIATED PAIN: ICD-10-CM

## 2024-04-08 DIAGNOSIS — C83.33 DIFFUSE LARGE B-CELL LYMPHOMA OF INTRA-ABDOMINAL LYMPH NODES (HCC): Primary | ICD-10-CM

## 2024-04-08 DIAGNOSIS — Z92.21 HISTORY OF ANTINEOPLASTIC CHEMOTHERAPY: ICD-10-CM

## 2024-04-08 LAB
ALBUMIN SERPL-MCNC: 4.1 G/DL (ref 3.5–5.2)
ALP SERPL-CCNC: 109 U/L (ref 35–104)
ALT SERPL-CCNC: 12 U/L (ref 5–33)
ANION GAP SERPL CALCULATED.3IONS-SCNC: 9 MMOL/L (ref 9–17)
AST SERPL-CCNC: 16 U/L
BASOPHILS # BLD: <0.03 K/UL (ref 0–0.2)
BASOPHILS NFR BLD: 0 % (ref 0–2)
BILIRUB SERPL-MCNC: 0.3 MG/DL (ref 0.3–1.2)
BUN SERPL-MCNC: 13 MG/DL (ref 8–23)
BUN/CREAT SERPL: 22 (ref 9–20)
CALCIUM SERPL-MCNC: 9.5 MG/DL (ref 8.6–10.4)
CHLORIDE SERPL-SCNC: 106 MMOL/L (ref 98–107)
CO2 SERPL-SCNC: 28 MMOL/L (ref 20–31)
CREAT SERPL-MCNC: 0.6 MG/DL (ref 0.5–0.9)
EOSINOPHIL # BLD: 0.22 K/UL (ref 0–0.44)
EOSINOPHILS RELATIVE PERCENT: 5 % (ref 1–4)
ERYTHROCYTE [DISTWIDTH] IN BLOOD BY AUTOMATED COUNT: 12.3 % (ref 11.8–14.4)
GFR SERPL CREATININE-BSD FRML MDRD: >90 ML/MIN/1.73M2
GLUCOSE SERPL-MCNC: 103 MG/DL (ref 70–99)
HCT VFR BLD AUTO: 44.9 % (ref 36.3–47.1)
HGB BLD-MCNC: 14.6 G/DL (ref 11.9–15.1)
IMM GRANULOCYTES # BLD AUTO: 0.01 K/UL (ref 0–0.3)
IMM GRANULOCYTES NFR BLD: 0 %
LDH SERPL-CCNC: 198 U/L (ref 135–214)
LYMPHOCYTES NFR BLD: 1.07 K/UL (ref 1.1–3.7)
LYMPHOCYTES RELATIVE PERCENT: 25 % (ref 24–43)
MCH RBC QN AUTO: 32.4 PG (ref 25.2–33.5)
MCHC RBC AUTO-ENTMCNC: 32.5 G/DL (ref 28.4–34.8)
MCV RBC AUTO: 99.6 FL (ref 82.6–102.9)
MONOCYTES NFR BLD: 0.36 K/UL (ref 0.1–1.2)
MONOCYTES NFR BLD: 9 % (ref 3–12)
NEUTROPHILS NFR BLD: 61 % (ref 36–65)
NEUTS SEG NFR BLD: 2.58 K/UL (ref 1.5–8.1)
NRBC BLD-RTO: 0 PER 100 WBC
PLATELET # BLD AUTO: 210 K/UL (ref 138–453)
PMV BLD AUTO: 8.7 FL (ref 8.1–13.5)
POTASSIUM SERPL-SCNC: 3.9 MMOL/L (ref 3.7–5.3)
PROT SERPL-MCNC: 7 G/DL (ref 6.4–8.3)
RBC # BLD AUTO: 4.51 M/UL (ref 3.95–5.11)
SODIUM SERPL-SCNC: 143 MMOL/L (ref 135–144)
WBC OTHER # BLD: 4.3 K/UL (ref 3.5–11.3)

## 2024-04-08 PROCEDURE — 1090F PRES/ABSN URINE INCON ASSESS: CPT | Performed by: INTERNAL MEDICINE

## 2024-04-08 PROCEDURE — 83615 LACTATE (LD) (LDH) ENZYME: CPT

## 2024-04-08 PROCEDURE — 3017F COLORECTAL CA SCREEN DOC REV: CPT | Performed by: INTERNAL MEDICINE

## 2024-04-08 PROCEDURE — 1123F ACP DISCUSS/DSCN MKR DOCD: CPT | Performed by: INTERNAL MEDICINE

## 2024-04-08 PROCEDURE — 1111F DSCHRG MED/CURRENT MED MERGE: CPT | Performed by: INTERNAL MEDICINE

## 2024-04-08 PROCEDURE — G8417 CALC BMI ABV UP PARAM F/U: HCPCS | Performed by: INTERNAL MEDICINE

## 2024-04-08 PROCEDURE — 36415 COLL VENOUS BLD VENIPUNCTURE: CPT

## 2024-04-08 PROCEDURE — G8399 PT W/DXA RESULTS DOCUMENT: HCPCS | Performed by: INTERNAL MEDICINE

## 2024-04-08 PROCEDURE — G8427 DOCREV CUR MEDS BY ELIG CLIN: HCPCS | Performed by: INTERNAL MEDICINE

## 2024-04-08 PROCEDURE — 1036F TOBACCO NON-USER: CPT | Performed by: INTERNAL MEDICINE

## 2024-04-08 PROCEDURE — 80053 COMPREHEN METABOLIC PANEL: CPT

## 2024-04-08 PROCEDURE — 99214 OFFICE O/P EST MOD 30 MIN: CPT | Performed by: INTERNAL MEDICINE

## 2024-04-08 PROCEDURE — 85025 COMPLETE CBC W/AUTO DIFF WBC: CPT

## 2024-04-08 PROCEDURE — 99211 OFF/OP EST MAY X REQ PHY/QHP: CPT

## 2024-04-08 NOTE — TELEPHONE ENCOUNTER
MAICO HERE FOR MD VISIT  Rv in 3 months wit labs and scans prior   CT CHEST IS ON 7/1/24 @ 10AM AT [facility] ARRIVAL @ 9:45AM  MD VISIT 7/8/24 @ 10AM  AVS PRINTED W/ INSTRUCTIONS AND GIVEN  TO PT ON EXIT

## 2024-04-08 NOTE — PROGRESS NOTES
[facility]: High-resolution colonoscopy    Discharge instructions for:   Jacque Miller   [id number]     Your examination was performed by:   Lexx Calloway MD FACP FACG RAZIA GUAJARDO    Information specific to your procedure:    Normal colon and terminal ileum. Random colon biopsies obtained and are pending.        What to expect immediately after the procedure:  1. You will be drowsy for the next few hours. You may forget that Dr. Calloway talked to you after the procedure. This is a normal consequence of the procedure. If you forget the post-procedural discussion and the relative accompanying you cannot provide details please call us at [phone] and ask for the GI Department.   2. You may resume your medications after the procedure  3. Do not drive, drink alcohol, use machinery or sign important documents on the day of the procedure  4. You may resume a normal diet      Side-effects/Complications:     1. Although complications after colonoscopy are uncommon, it is important to recognize early signs of possible complications. Contact your doctor (Call us at [phone] during regular business hours and ask for the GI department or [phone] after-hours/on weekends/holidays and ask for the GI doctor on call) if you notice severe abdominal pain, fever (>101) and chills, or rectal bleeding of more than one-half cup. Note that bleeding can occur several days after the procedure. For Emergency room care we recommend [facility] at the corner of [address]  to obtain prompt service.   2. Redness or swelling at the intravenous site that does not resolve in 2-3 days. Mild irritation is common. Use a warm, wet wash cloth over the affected area for 20 minutes for 2-3 days and it will gradually resolve.                 
dextroscoliosis with degenerative spondylosis noted.  Lower lumbar facet degenerative changes are noted.     1. No acute cardiopulmonary process or findings of malignant adenopathy. 2. Stable cholelithiasis. 3. Stable para-aortic soft tissue thickening without findings of new malignant adenopathy.        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  Clinically patient continues to do well.  No evidence of disease recurrence.  Continue surveillance per NCCN guidelines  Will obtain scans with next office visit  Pain is well-controlled and patient is not requiring as much pain medication.  Recommend age-appropriate screening studies  Continue surveillance per NCCN guidelines  Return to clinic in 3 months with labs and scans.      GARLAND TAYLOR MD      this note is created with the assistance of a speech recognition program.  While intending to generate a document that actually reflects the content of the visit, the document can still have some errors including those of syntax and sound a like substitutions which may escape proof reading.  It such instances, actual meaning can be extrapolated by contextual diversion.

## 2024-05-13 ENCOUNTER — OFFICE VISIT (OUTPATIENT)
Dept: FAMILY MEDICINE CLINIC | Age: 74
End: 2024-05-13
Payer: MEDICARE

## 2024-05-13 VITALS
OXYGEN SATURATION: 97 % | SYSTOLIC BLOOD PRESSURE: 110 MMHG | DIASTOLIC BLOOD PRESSURE: 80 MMHG | HEIGHT: 62 IN | BODY MASS INDEX: 33.93 KG/M2 | WEIGHT: 184.4 LBS | HEART RATE: 105 BPM

## 2024-05-13 DIAGNOSIS — K80.20 CALCULUS OF GALLBLADDER WITHOUT CHOLECYSTITIS WITHOUT OBSTRUCTION: ICD-10-CM

## 2024-05-13 DIAGNOSIS — S32.009A: ICD-10-CM

## 2024-05-13 DIAGNOSIS — C83.33 DIFFUSE LARGE B-CELL LYMPHOMA OF INTRA-ABDOMINAL LYMPH NODES (HCC): ICD-10-CM

## 2024-05-13 DIAGNOSIS — E78.2 MIXED HYPERLIPIDEMIA: Primary | ICD-10-CM

## 2024-05-13 DIAGNOSIS — M80.00XG AGE-RELATED OSTEOPOROSIS WITH CURRENT PATHOLOGICAL FRACTURE WITH DELAYED HEALING, SUBSEQUENT ENCOUNTER: ICD-10-CM

## 2024-05-13 PROCEDURE — G8399 PT W/DXA RESULTS DOCUMENT: HCPCS | Performed by: FAMILY MEDICINE

## 2024-05-13 PROCEDURE — 99214 OFFICE O/P EST MOD 30 MIN: CPT | Performed by: FAMILY MEDICINE

## 2024-05-13 PROCEDURE — G8427 DOCREV CUR MEDS BY ELIG CLIN: HCPCS | Performed by: FAMILY MEDICINE

## 2024-05-13 PROCEDURE — G8417 CALC BMI ABV UP PARAM F/U: HCPCS | Performed by: FAMILY MEDICINE

## 2024-05-13 PROCEDURE — 1090F PRES/ABSN URINE INCON ASSESS: CPT | Performed by: FAMILY MEDICINE

## 2024-05-13 PROCEDURE — 1123F ACP DISCUSS/DSCN MKR DOCD: CPT | Performed by: FAMILY MEDICINE

## 2024-05-13 PROCEDURE — 3017F COLORECTAL CA SCREEN DOC REV: CPT | Performed by: FAMILY MEDICINE

## 2024-05-13 PROCEDURE — 1036F TOBACCO NON-USER: CPT | Performed by: FAMILY MEDICINE

## 2024-05-13 ASSESSMENT — ENCOUNTER SYMPTOMS
STRIDOR: 0
EYE REDNESS: 0
ABDOMINAL DISTENTION: 0
RECTAL PAIN: 0
SORE THROAT: 0
CONSTIPATION: 0
COUGH: 0
DIARRHEA: 0
VOMITING: 0
NAUSEA: 0
CHEST TIGHTNESS: 0
WHEEZING: 0
BACK PAIN: 1
RHINORRHEA: 0
COLOR CHANGE: 0
BLOOD IN STOOL: 0
ABDOMINAL PAIN: 0
SHORTNESS OF BREATH: 0

## 2024-05-13 NOTE — PROGRESS NOTES
Visit Information    Have you changed or started any medications since your last visit including any over-the-counter medicines, vitamins, or herbal medicines? no   Are you having any side effects from any of your medications? -  no  Have you stopped taking any of your medications? Is so, why? -  no    Have you seen any other physician or provider since your last visit? No  Have you had any other diagnostic tests since your last visit? No  Have you been seen in the emergency room and/or had an admission to a hospital since we last saw you? No  Have you had your routine dental cleaning in the past 6 months? no    Have you activated your [email or web address] account? If not, what are your barriers? Yes     Patient Care Team:  Anjum Marcos MD as PCP - General (Family Medicine)  Anjum Marcos MD as PCP - Empaneled Provider  Walter Mann MD as Consulting Physician (Radiation Oncology)    Medical History Review  Past Medical, Family, and Social History reviewed and does contribute to the patient presenting condition    Health Maintenance   Topic Date Due    Shingles vaccine (1 of 2) 04/13/2003    Respiratory Syncytial Virus (RSV) Pregnant or age 60 yrs+ (1 - 1-dose 60+ series) Never done    COVID-19 Vaccine (5 - 2023-24 season) 09/01/2023    Annual Wellness Visit (Medicare Advantage)  01/01/2024    Breast cancer screen  02/21/2024    Lipids  02/27/2024    Depression Screen  01/11/2025    Colorectal Cancer Screen  12/04/2026    DTaP/Tdap/Td vaccine (3 - Td or Tdap) 02/08/2030    DEXA (modify frequency per FRAX score)  Completed    Flu vaccine  Completed    Pneumococcal 65+ years Vaccine  Completed    Hepatitis C screen  Completed    Hepatitis A vaccine  Aged Out    Hepatitis B vaccine  Aged Out    Hib vaccine  Aged Out    Polio vaccine  Aged Out    Meningococcal (ACWY) vaccine  Aged Out    GFR test (Diabetes, CKD 3-4, OR last GFR 15-59)  Discontinued     
cervical adenopathy.   Skin:     Coloration: Skin is not jaundiced or pale.      Findings: No bruising, erythema or rash.   Neurological:      General: No focal deficit present.      Mental Status: She is alert and oriented to person, place, and time.      Cranial Nerves: No cranial nerve deficit.      Sensory: Sensory deficit present.      Motor: No weakness or tremor.      Coordination: Romberg sign negative. Coordination normal.      Gait: Gait and tandem walk normal.      Deep Tendon Reflexes: Reflexes are normal and symmetric.   Psychiatric:         Attention and Perception: Attention and perception normal. She is attentive.         Mood and Affect: Mood is not anxious or depressed. Affect is not tearful.         Speech: She is communicative. Speech is not rapid and pressured, delayed or slurred.         Behavior: Behavior normal. Behavior is not agitated or slowed.         Thought Content: Thought content normal.         Judgment: Judgment normal.             ASSESSMENT AND PLAN      1. Mixed hyperlipidemia  Fairly stable no recent blood work, continue statins recheck lipid panel.  Discussed with patient watch for side effects.  - Lipid Panel; Future    2. Diffuse large B-cell lymphoma of intra-abdominal lymph nodes (HCC)  Completed the chemotherapy.  Symptoms are fairly stable, continue to monitor labs up-to-date.  CT chest pelvis scheduled.    Follow-up with oncologist on regular basis    3. Age-related osteoporosis with current pathological fracture with delayed healing, subsequent encounter  Recent DEXA scan osteoporosis, currently on Boniva and calcium and vitamin D supplements.  Recent compression fracture status post surgery.  Refused to see pain management handout provided for exercises.    4. Closed fracture of lumbar spine without lesion of spinal cord, initial encounter ([facility])  Recent surgery, patient refused to see pain management, is currently on narcotics by oncologist takes as needed.  Handout

## 2024-05-13 NOTE — PATIENT INSTRUCTIONS
results and important health updates, keeping you well-informed and engaged in your healthcare journey.    5. Increased engagement and collaboration: Direct scheduling encourages greater patient engagement and empowers you to take an active role in managing your healthcare. By accessing the [facility] Patient Portal, you can securely message your healthcare provider, ask questions, request prescription refills, and seek medical advice whenever you need it.    To get started with direct scheduling through the [facility] Patient Portal or to register with [facility], simply visit [email or web address].     We understand that change can sometimes be overwhelming, but we assure you that adopting direct scheduling through the [facility] Patient Portal will enhance your healthcare experience and put you in control of your appointments. Our dedicated staff is available to answer any questions or provide assistance throughout the process.    Thank you for entrusting us with your healthcare needs. We are committed to continuously improving our services and ensuring your satisfaction. Together, let's embrace the future of healthcare convenience and accessibility through direct scheduling on the [facility] Patient Portal.    Wishing you good health and happiness,    []

## 2024-07-05 ENCOUNTER — HOSPITAL ENCOUNTER (OUTPATIENT)
Dept: CT IMAGING | Age: 74
End: 2024-07-05
Attending: INTERNAL MEDICINE
Payer: MEDICARE

## 2024-07-05 DIAGNOSIS — C83.33 DIFFUSE LARGE B-CELL LYMPHOMA OF INTRA-ABDOMINAL LYMPH NODES (HCC): ICD-10-CM

## 2024-07-05 LAB
EGFR, POC: >90 ML/MIN/1.73M2
POC CREATININE: 0.7 MG/DL (ref 0.51–1.19)

## 2024-07-05 PROCEDURE — 82565 ASSAY OF CREATININE: CPT

## 2024-07-05 PROCEDURE — 74177 CT ABD & PELVIS W/CONTRAST: CPT

## 2024-07-05 PROCEDURE — 2580000003 HC RX 258: Performed by: INTERNAL MEDICINE

## 2024-07-05 PROCEDURE — 2500000003 HC RX 250 WO HCPCS: Performed by: INTERNAL MEDICINE

## 2024-07-05 PROCEDURE — 6360000004 HC RX CONTRAST MEDICATION: Performed by: INTERNAL MEDICINE

## 2024-07-05 RX ORDER — 0.9 % SODIUM CHLORIDE 0.9 %
100 INTRAVENOUS SOLUTION INTRAVENOUS ONCE
Status: COMPLETED | OUTPATIENT
Start: 2024-07-05 | End: 2024-07-05

## 2024-07-05 RX ORDER — SODIUM CHLORIDE 0.9 % (FLUSH) 0.9 %
10 SYRINGE (ML) INJECTION PRN
Status: DISCONTINUED | OUTPATIENT
Start: 2024-07-05 | End: 2024-07-08 | Stop reason: HOSPADM

## 2024-07-05 RX ADMIN — SODIUM CHLORIDE, PRESERVATIVE FREE 10 ML: 5 INJECTION INTRAVENOUS at 11:29

## 2024-07-05 RX ADMIN — IOPAMIDOL 75 ML: 755 INJECTION, SOLUTION INTRAVENOUS at 11:28

## 2024-07-05 RX ADMIN — SODIUM CHLORIDE 100 ML: 9 INJECTION, SOLUTION INTRAVENOUS at 11:29

## 2024-07-05 RX ADMIN — BARIUM SULFATE 450 ML: 20 SUSPENSION ORAL at 11:29

## 2024-07-08 ENCOUNTER — OFFICE VISIT (OUTPATIENT)
Dept: ONCOLOGY | Age: 74
End: 2024-07-08
Payer: MEDICARE

## 2024-07-08 ENCOUNTER — HOSPITAL ENCOUNTER (OUTPATIENT)
Age: 74
Setting detail: SPECIMEN
Discharge: HOME | End: 2024-07-08
Payer: MEDICARE

## 2024-07-08 ENCOUNTER — TELEPHONE (OUTPATIENT)
Dept: ONCOLOGY | Age: 74
End: 2024-07-08

## 2024-07-08 VITALS
WEIGHT: 185.3 LBS | SYSTOLIC BLOOD PRESSURE: 148 MMHG | DIASTOLIC BLOOD PRESSURE: 85 MMHG | BODY MASS INDEX: 33.88 KG/M2 | TEMPERATURE: 96.9 F | HEART RATE: 69 BPM | RESPIRATION RATE: 16 BRPM

## 2024-07-08 DIAGNOSIS — C83.33 DIFFUSE LARGE B-CELL LYMPHOMA OF INTRA-ABDOMINAL LYMPH NODES (HCC): Primary | ICD-10-CM

## 2024-07-08 DIAGNOSIS — Z92.21 HISTORY OF ANTINEOPLASTIC CHEMOTHERAPY: ICD-10-CM

## 2024-07-08 DIAGNOSIS — G89.3 CANCER ASSOCIATED PAIN: ICD-10-CM

## 2024-07-08 DIAGNOSIS — D70.9 NEUTROPENIA, UNSPECIFIED TYPE (HCC): ICD-10-CM

## 2024-07-08 DIAGNOSIS — C83.33 DIFFUSE LARGE B-CELL LYMPHOMA OF INTRA-ABDOMINAL LYMPH NODES (HCC): ICD-10-CM

## 2024-07-08 LAB
ALBUMIN SERPL-MCNC: 4 G/DL (ref 3.5–5.2)
ALP SERPL-CCNC: 75 U/L (ref 35–104)
ALT SERPL-CCNC: 10 U/L (ref 5–33)
ANION GAP SERPL CALCULATED.3IONS-SCNC: 9 MMOL/L (ref 9–17)
AST SERPL-CCNC: 15 U/L
BASOPHILS # BLD: <0.03 K/UL (ref 0–0.2)
BASOPHILS NFR BLD: 0 % (ref 0–2)
BILIRUB SERPL-MCNC: 0.3 MG/DL (ref 0.3–1.2)
BUN SERPL-MCNC: 15 MG/DL (ref 8–23)
BUN/CREAT SERPL: 25 (ref 9–20)
CALCIUM SERPL-MCNC: 9.2 MG/DL (ref 8.6–10.4)
CHLORIDE SERPL-SCNC: 106 MMOL/L (ref 98–107)
CO2 SERPL-SCNC: 27 MMOL/L (ref 20–31)
CREAT SERPL-MCNC: 0.6 MG/DL (ref 0.5–0.9)
EOSINOPHIL # BLD: 0.13 K/UL (ref 0–0.44)
EOSINOPHILS RELATIVE PERCENT: 4 % (ref 1–4)
ERYTHROCYTE [DISTWIDTH] IN BLOOD BY AUTOMATED COUNT: 12.8 % (ref 11.8–14.4)
GFR, ESTIMATED: >90 ML/MIN/1.73M2
GLUCOSE SERPL-MCNC: 92 MG/DL (ref 70–99)
HCT VFR BLD AUTO: 41.5 % (ref 36.3–47.1)
HGB BLD-MCNC: 13.6 G/DL (ref 11.9–15.1)
IMM GRANULOCYTES # BLD AUTO: 0 K/UL (ref 0–0.3)
IMM GRANULOCYTES NFR BLD: 0 %
LDH SERPL-CCNC: 211 U/L (ref 135–214)
LYMPHOCYTES NFR BLD: 1.3 K/UL (ref 1.1–3.7)
LYMPHOCYTES RELATIVE PERCENT: 45 % (ref 24–43)
MCH RBC QN AUTO: 32.3 PG (ref 25.2–33.5)
MCHC RBC AUTO-ENTMCNC: 32.8 G/DL (ref 28.4–34.8)
MCV RBC AUTO: 98.6 FL (ref 82.6–102.9)
MONOCYTES NFR BLD: 0.34 K/UL (ref 0.1–1.2)
MONOCYTES NFR BLD: 11 % (ref 3–12)
NEUTROPHILS NFR BLD: 40 % (ref 36–65)
NEUTS SEG NFR BLD: 1.2 K/UL (ref 1.5–8.1)
NRBC BLD-RTO: 0 PER 100 WBC
PLATELET # BLD AUTO: 193 K/UL (ref 138–453)
PMV BLD AUTO: 9.2 FL (ref 8.1–13.5)
POTASSIUM SERPL-SCNC: 4.8 MMOL/L (ref 3.7–5.3)
PROT SERPL-MCNC: 6.7 G/DL (ref 6.4–8.3)
RBC # BLD AUTO: 4.21 M/UL (ref 3.95–5.11)
SODIUM SERPL-SCNC: 142 MMOL/L (ref 135–144)
WBC OTHER # BLD: 3 K/UL (ref 3.5–11.3)

## 2024-07-08 PROCEDURE — 85025 COMPLETE CBC W/AUTO DIFF WBC: CPT

## 2024-07-08 PROCEDURE — 99211 OFF/OP EST MAY X REQ PHY/QHP: CPT

## 2024-07-08 PROCEDURE — 80053 COMPREHEN METABOLIC PANEL: CPT

## 2024-07-08 PROCEDURE — 1123F ACP DISCUSS/DSCN MKR DOCD: CPT | Performed by: INTERNAL MEDICINE

## 2024-07-08 PROCEDURE — G8399 PT W/DXA RESULTS DOCUMENT: HCPCS | Performed by: INTERNAL MEDICINE

## 2024-07-08 PROCEDURE — 3017F COLORECTAL CA SCREEN DOC REV: CPT | Performed by: INTERNAL MEDICINE

## 2024-07-08 PROCEDURE — 1090F PRES/ABSN URINE INCON ASSESS: CPT | Performed by: INTERNAL MEDICINE

## 2024-07-08 PROCEDURE — G8427 DOCREV CUR MEDS BY ELIG CLIN: HCPCS | Performed by: INTERNAL MEDICINE

## 2024-07-08 PROCEDURE — G8417 CALC BMI ABV UP PARAM F/U: HCPCS | Performed by: INTERNAL MEDICINE

## 2024-07-08 PROCEDURE — 83615 LACTATE (LD) (LDH) ENZYME: CPT

## 2024-07-08 PROCEDURE — 36415 COLL VENOUS BLD VENIPUNCTURE: CPT

## 2024-07-08 PROCEDURE — 99214 OFFICE O/P EST MOD 30 MIN: CPT | Performed by: INTERNAL MEDICINE

## 2024-07-08 PROCEDURE — 1036F TOBACCO NON-USER: CPT | Performed by: INTERNAL MEDICINE

## 2024-07-08 NOTE — TELEPHONE ENCOUNTER
JAY HERE FOR FOLLOW UP   Rv in 6 months with labs before rv   LABS ORDERED: CBC CMP VIT B12 & FOLATE FERRITIN IRON & TIBC   WRITER WILL CALL PT TO RESCHEDULE ONCE CALENDAR IS MADE   LABS PRINTED AND GIVEN ON EXIT   AVS PRINTED AND GIVEN ON EXIT
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
## 2024-08-21 DIAGNOSIS — D69.6 THROMBOCYTOPENIA, UNSPECIFIED (HCC): ICD-10-CM

## 2024-08-21 DIAGNOSIS — C83.33 DIFFUSE LARGE B-CELL LYMPHOMA OF INTRA-ABDOMINAL LYMPH NODES (HCC): ICD-10-CM

## 2024-08-21 DIAGNOSIS — G89.3 CANCER ASSOCIATED PAIN: ICD-10-CM

## 2024-08-21 DIAGNOSIS — E78.2 MIXED HYPERLIPIDEMIA: ICD-10-CM

## 2024-08-21 DIAGNOSIS — Z51.11 ENCOUNTER FOR CHEMOTHERAPY MANAGEMENT: ICD-10-CM

## 2024-08-21 RX ORDER — ATORVASTATIN CALCIUM 20 MG/1
20 TABLET, FILM COATED ORAL DAILY
Qty: 90 TABLET | Refills: 0 | Status: SHIPPED | OUTPATIENT
Start: 2024-08-21

## 2024-08-21 RX ORDER — OMEPRAZOLE 20 MG/1
CAPSULE, DELAYED RELEASE ORAL
Qty: 90 CAPSULE | Refills: 0 | Status: SHIPPED | OUTPATIENT
Start: 2024-08-21

## 2024-08-21 NOTE — TELEPHONE ENCOUNTER
Please Approve or Refuse.  Send to Pharmacy per Pt's Request:      Next Visit Date:  9/13/2024   Last Visit Date: 5/13/2024    No results found for: \"LABA1C\"          ( goal A1C is < 7)   BP Readings from Last 3 Encounters:   07/08/24 (!) 148/85   05/13/24 110/80   04/08/24 (!) 162/90          (goal 120/80)  BUN   Date Value Ref Range Status   07/08/2024 15 8 - 23 mg/dL Final     Creatinine   Date Value Ref Range Status   07/08/2024 0.6 0.5 - 0.9 mg/dL Final     Potassium   Date Value Ref Range Status   07/08/2024 4.8 3.7 - 5.3 mmol/L Final

## 2024-09-10 SDOH — HEALTH STABILITY: PHYSICAL HEALTH: ON AVERAGE, HOW MANY DAYS PER WEEK DO YOU ENGAGE IN MODERATE TO STRENUOUS EXERCISE (LIKE A BRISK WALK)?: 0 DAYS

## 2024-09-10 SDOH — HEALTH STABILITY: PHYSICAL HEALTH: ON AVERAGE, HOW MANY MINUTES DO YOU ENGAGE IN EXERCISE AT THIS LEVEL?: 0 MIN

## 2024-09-10 ASSESSMENT — PATIENT HEALTH QUESTIONNAIRE - PHQ9
2. FEELING DOWN, DEPRESSED OR HOPELESS: NOT AT ALL
SUM OF ALL RESPONSES TO PHQ QUESTIONS 1-9: 0
SUM OF ALL RESPONSES TO PHQ QUESTIONS 1-9: 0
1. LITTLE INTEREST OR PLEASURE IN DOING THINGS: NOT AT ALL
SUM OF ALL RESPONSES TO PHQ QUESTIONS 1-9: 0
SUM OF ALL RESPONSES TO PHQ9 QUESTIONS 1 & 2: 0
SUM OF ALL RESPONSES TO PHQ QUESTIONS 1-9: 0

## 2024-09-10 ASSESSMENT — LIFESTYLE VARIABLES
HOW MANY STANDARD DRINKS CONTAINING ALCOHOL DO YOU HAVE ON A TYPICAL DAY: PATIENT DOES NOT DRINK
HOW OFTEN DO YOU HAVE SIX OR MORE DRINKS ON ONE OCCASION: 1
HOW OFTEN DO YOU HAVE A DRINK CONTAINING ALCOHOL: NEVER
HOW OFTEN DO YOU HAVE A DRINK CONTAINING ALCOHOL: 1
HOW MANY STANDARD DRINKS CONTAINING ALCOHOL DO YOU HAVE ON A TYPICAL DAY: 0

## 2024-09-13 ENCOUNTER — OFFICE VISIT (OUTPATIENT)
Dept: FAMILY MEDICINE CLINIC | Age: 74
End: 2024-09-13

## 2024-09-13 VITALS
DIASTOLIC BLOOD PRESSURE: 82 MMHG | HEIGHT: 62 IN | HEART RATE: 75 BPM | OXYGEN SATURATION: 98 % | SYSTOLIC BLOOD PRESSURE: 130 MMHG | BODY MASS INDEX: 34.78 KG/M2 | WEIGHT: 189 LBS

## 2024-09-13 DIAGNOSIS — M16.10 ARTHRITIS OF HIP: ICD-10-CM

## 2024-09-13 DIAGNOSIS — S32.010A CLOSED COMPRESSION FRACTURE OF L1 LUMBAR VERTEBRA, INITIAL ENCOUNTER (HCC): ICD-10-CM

## 2024-09-13 DIAGNOSIS — M80.00XS AGE-RELATED OSTEOPOROSIS WITH CURRENT PATHOLOGICAL FRACTURE, SEQUELA: ICD-10-CM

## 2024-09-13 DIAGNOSIS — M43.10 DEGENERATIVE SPONDYLOLISTHESIS: ICD-10-CM

## 2024-09-13 DIAGNOSIS — C83.33 DIFFUSE LARGE B-CELL LYMPHOMA OF INTRA-ABDOMINAL LYMPH NODES (HCC): ICD-10-CM

## 2024-09-13 DIAGNOSIS — R03.0 ELEVATED BP WITHOUT DIAGNOSIS OF HYPERTENSION: ICD-10-CM

## 2024-09-13 DIAGNOSIS — Z00.00 MEDICARE ANNUAL WELLNESS VISIT, SUBSEQUENT: Primary | ICD-10-CM

## 2024-09-13 DIAGNOSIS — Z23 NEED FOR VACCINATION: ICD-10-CM

## 2024-09-13 DIAGNOSIS — I72.8 SPLENIC ARTERY ANEURYSM (HCC): ICD-10-CM

## 2024-09-13 DIAGNOSIS — Z12.31 SCREENING MAMMOGRAM FOR HIGH-RISK PATIENT: ICD-10-CM

## 2024-09-13 RX ORDER — MELOXICAM 7.5 MG/1
7.5 TABLET ORAL 2 TIMES DAILY PRN
Qty: 90 TABLET | Refills: 0 | Status: SHIPPED | OUTPATIENT
Start: 2024-09-13

## 2024-09-13 ASSESSMENT — ENCOUNTER SYMPTOMS
COUGH: 0
BLOOD IN STOOL: 0
COLOR CHANGE: 0
NAUSEA: 0
SINUS PRESSURE: 0
SHORTNESS OF BREATH: 0
RECTAL PAIN: 0
CONSTIPATION: 0
ABDOMINAL DISTENTION: 0
VOMITING: 0
RHINORRHEA: 0
SORE THROAT: 0
ABDOMINAL PAIN: 0
TROUBLE SWALLOWING: 0
EYE REDNESS: 0
WHEEZING: 0
CHEST TIGHTNESS: 0
BACK PAIN: 1
DIARRHEA: 0
STRIDOR: 0

## 2024-09-13 ASSESSMENT — VISUAL ACUITY
OS_CC: 20/40
OD_CC: 20/40

## 2024-11-03 DIAGNOSIS — E78.2 MIXED HYPERLIPIDEMIA: ICD-10-CM

## 2024-11-04 RX ORDER — ATORVASTATIN CALCIUM 20 MG/1
20 TABLET, FILM COATED ORAL DAILY
Qty: 90 TABLET | Refills: 3 | Status: SHIPPED | OUTPATIENT
Start: 2024-11-04

## 2024-11-04 NOTE — TELEPHONE ENCOUNTER
Please Approve or Refuse.  Send to Pharmacy per Pt's Request: [facility]      Next Visit Date:  3/13/2025   Last Visit Date: 9/13/2024    No results found for: \"LABA1C\"          ( goal A1C is < 7)   BP Readings from Last 3 Encounters:   09/13/24 130/82   07/08/24 (!) 148/85   05/13/24 110/80          (goal 120/80)  BUN   Date Value Ref Range Status   07/08/2024 15 8 - 23 mg/dL Final     Creatinine   Date Value Ref Range Status   07/08/2024 0.6 0.5 - 0.9 mg/dL Final     Potassium   Date Value Ref Range Status   07/08/2024 4.8 3.7 - 5.3 mmol/L Final

## 2024-11-12 DIAGNOSIS — M16.10 ARTHRITIS OF HIP: ICD-10-CM

## 2024-11-12 RX ORDER — MELOXICAM 7.5 MG/1
TABLET ORAL
Qty: 90 TABLET | Refills: 3 | Status: SHIPPED | OUTPATIENT
Start: 2024-11-12

## 2024-11-16 DIAGNOSIS — D69.6 THROMBOCYTOPENIA, UNSPECIFIED (HCC): ICD-10-CM

## 2024-11-16 DIAGNOSIS — C83.33 DIFFUSE LARGE B-CELL LYMPHOMA OF INTRA-ABDOMINAL LYMPH NODES (HCC): ICD-10-CM

## 2024-11-16 DIAGNOSIS — G89.3 CANCER ASSOCIATED PAIN: ICD-10-CM

## 2024-11-16 DIAGNOSIS — Z51.11 ENCOUNTER FOR CHEMOTHERAPY MANAGEMENT: ICD-10-CM

## 2024-11-18 NOTE — TELEPHONE ENCOUNTER
Please Approve or Refuse.  Send to Pharmacy per Pt's Request:      Next Visit Date:  3/13/2025   Last Visit Date: 9/13/2024    No results found for: \"LABA1C\"          ( goal A1C is < 7)   BP Readings from Last 3 Encounters:   09/13/24 130/82   07/08/24 (!) 148/85   05/13/24 110/80          (goal 120/80)  BUN   Date Value Ref Range Status   07/08/2024 15 8 - 23 mg/dL Final     Creatinine   Date Value Ref Range Status   07/08/2024 0.6 0.5 - 0.9 mg/dL Final     Potassium   Date Value Ref Range Status   07/08/2024 4.8 3.7 - 5.3 mmol/L Final

## 2024-12-12 ENCOUNTER — HOSPITAL ENCOUNTER (OUTPATIENT)
Dept: WOMENS IMAGING | Age: 74
Discharge: HOME | End: 2024-12-14
Payer: MEDICARE

## 2024-12-12 DIAGNOSIS — Z12.31 ENCOUNTER FOR SCREENING MAMMOGRAM FOR HIGH-RISK PATIENT: ICD-10-CM

## 2024-12-12 PROCEDURE — 77063 BREAST TOMOSYNTHESIS BI: CPT

## 2025-01-02 DIAGNOSIS — C83.33 DIFFUSE LARGE B-CELL LYMPHOMA OF INTRA-ABDOMINAL LYMPH NODES (HCC): Primary | ICD-10-CM

## 2025-01-17 ENCOUNTER — HOSPITAL ENCOUNTER (OUTPATIENT)
Age: 75
Setting detail: SPECIMEN
Discharge: HOME | End: 2025-01-17
Payer: MEDICARE

## 2025-01-17 DIAGNOSIS — Z92.21 HISTORY OF ANTINEOPLASTIC CHEMOTHERAPY: ICD-10-CM

## 2025-01-17 DIAGNOSIS — D70.9 NEUTROPENIA, UNSPECIFIED TYPE (HCC): ICD-10-CM

## 2025-01-17 DIAGNOSIS — C83.33 DIFFUSE LARGE B-CELL LYMPHOMA OF INTRA-ABDOMINAL LYMPH NODES (HCC): ICD-10-CM

## 2025-01-17 LAB
ALBUMIN SERPL-MCNC: 3.9 G/DL (ref 3.5–5.2)
ALBUMIN/GLOB SERPL: 1.4 {RATIO} (ref 1–2.5)
ALP SERPL-CCNC: 75 U/L (ref 35–104)
ALT SERPL-CCNC: 16 U/L (ref 10–35)
ANION GAP SERPL CALCULATED.3IONS-SCNC: 10 MMOL/L (ref 9–16)
AST SERPL-CCNC: 20 U/L (ref 10–35)
BASOPHILS # BLD: <0.03 K/UL (ref 0–0.2)
BASOPHILS NFR BLD: 0 % (ref 0–2)
BILIRUB SERPL-MCNC: 0.4 MG/DL (ref 0–1.2)
BUN SERPL-MCNC: 19 MG/DL (ref 8–23)
CALCIUM SERPL-MCNC: 9.3 MG/DL (ref 8.8–10.2)
CHLORIDE SERPL-SCNC: 105 MMOL/L (ref 98–107)
CO2 SERPL-SCNC: 26 MMOL/L (ref 20–31)
CREAT SERPL-MCNC: 0.6 MG/DL (ref 0.5–0.9)
EOSINOPHIL # BLD: 0.12 K/UL (ref 0–0.44)
EOSINOPHILS RELATIVE PERCENT: 2 % (ref 1–4)
ERYTHROCYTE [DISTWIDTH] IN BLOOD BY AUTOMATED COUNT: 12.6 % (ref 11.8–14.4)
FERRITIN SERPL-MCNC: 93 NG/ML
FOLATE SERPL-MCNC: 18.1 NG/ML (ref 4.8–24.2)
GFR, ESTIMATED: >90 ML/MIN/1.73M2
GLUCOSE SERPL-MCNC: 86 MG/DL (ref 82–115)
HCT VFR BLD AUTO: 40.9 % (ref 36.3–47.1)
HGB BLD-MCNC: 13.5 G/DL (ref 11.9–15.1)
IMM GRANULOCYTES # BLD AUTO: 0.01 K/UL (ref 0–0.3)
IMM GRANULOCYTES NFR BLD: 0 %
IRON SATN MFR SERPL: 45 % (ref 20–55)
IRON SERPL-MCNC: 120 UG/DL (ref 37–145)
LYMPHOCYTES NFR BLD: 1.38 K/UL (ref 1.1–3.7)
LYMPHOCYTES RELATIVE PERCENT: 25 % (ref 24–43)
MCH RBC QN AUTO: 32.8 PG (ref 25.2–33.5)
MCHC RBC AUTO-ENTMCNC: 33 G/DL (ref 28.4–34.8)
MCV RBC AUTO: 99.5 FL (ref 82.6–102.9)
MONOCYTES NFR BLD: 0.49 K/UL (ref 0.1–1.2)
MONOCYTES NFR BLD: 9 % (ref 3–12)
NEUTROPHILS NFR BLD: 64 % (ref 36–65)
NEUTS SEG NFR BLD: 3.56 K/UL (ref 1.5–8.1)
NRBC BLD-RTO: 0 PER 100 WBC
PLATELET # BLD AUTO: 218 K/UL (ref 138–453)
PMV BLD AUTO: 9 FL (ref 8.1–13.5)
POTASSIUM SERPL-SCNC: 4.1 MMOL/L (ref 3.7–5.3)
PROT SERPL-MCNC: 6.6 G/DL (ref 6.6–8.7)
RBC # BLD AUTO: 4.11 M/UL (ref 3.95–5.11)
SODIUM SERPL-SCNC: 141 MMOL/L (ref 136–145)
TIBC SERPL-MCNC: 264 UG/DL (ref 250–450)
UNSATURATED IRON BINDING CAPACITY: 144 UG/DL (ref 112–347)
VIT B12 SERPL-MCNC: >2000 PG/ML (ref 232–1245)
WBC OTHER # BLD: 5.6 K/UL (ref 3.5–11.3)

## 2025-01-17 PROCEDURE — 82728 ASSAY OF FERRITIN: CPT

## 2025-01-17 PROCEDURE — 80053 COMPREHEN METABOLIC PANEL: CPT

## 2025-01-17 PROCEDURE — 36415 COLL VENOUS BLD VENIPUNCTURE: CPT

## 2025-01-17 PROCEDURE — 83550 IRON BINDING TEST: CPT

## 2025-01-17 PROCEDURE — 82746 ASSAY OF FOLIC ACID SERUM: CPT

## 2025-01-17 PROCEDURE — 83540 ASSAY OF IRON: CPT

## 2025-01-17 PROCEDURE — 85025 COMPLETE CBC W/AUTO DIFF WBC: CPT

## 2025-01-17 PROCEDURE — 82607 VITAMIN B-12: CPT

## 2025-01-20 ENCOUNTER — OFFICE VISIT (OUTPATIENT)
Dept: ONCOLOGY | Age: 75
End: 2025-01-20
Payer: MEDICARE

## 2025-01-20 ENCOUNTER — TELEPHONE (OUTPATIENT)
Dept: INTERVENTIONAL RADIOLOGY/VASCULAR | Age: 75
End: 2025-01-20

## 2025-01-20 ENCOUNTER — TELEPHONE (OUTPATIENT)
Dept: ONCOLOGY | Age: 75
End: 2025-01-20

## 2025-01-20 VITALS
DIASTOLIC BLOOD PRESSURE: 75 MMHG | HEART RATE: 80 BPM | RESPIRATION RATE: 16 BRPM | WEIGHT: 195.6 LBS | TEMPERATURE: 97.7 F | BODY MASS INDEX: 35.78 KG/M2 | SYSTOLIC BLOOD PRESSURE: 151 MMHG

## 2025-01-20 DIAGNOSIS — Z92.21 HISTORY OF ANTINEOPLASTIC CHEMOTHERAPY: ICD-10-CM

## 2025-01-20 DIAGNOSIS — C83.33 DIFFUSE LARGE B-CELL LYMPHOMA OF INTRA-ABDOMINAL LYMPH NODES (HCC): Primary | ICD-10-CM

## 2025-01-20 PROCEDURE — 1159F MED LIST DOCD IN RCRD: CPT | Performed by: INTERNAL MEDICINE

## 2025-01-20 PROCEDURE — 1036F TOBACCO NON-USER: CPT | Performed by: INTERNAL MEDICINE

## 2025-01-20 PROCEDURE — 1123F ACP DISCUSS/DSCN MKR DOCD: CPT | Performed by: INTERNAL MEDICINE

## 2025-01-20 PROCEDURE — G8427 DOCREV CUR MEDS BY ELIG CLIN: HCPCS | Performed by: INTERNAL MEDICINE

## 2025-01-20 PROCEDURE — 1090F PRES/ABSN URINE INCON ASSESS: CPT | Performed by: INTERNAL MEDICINE

## 2025-01-20 PROCEDURE — 99214 OFFICE O/P EST MOD 30 MIN: CPT | Performed by: INTERNAL MEDICINE

## 2025-01-20 PROCEDURE — G8399 PT W/DXA RESULTS DOCUMENT: HCPCS | Performed by: INTERNAL MEDICINE

## 2025-01-20 PROCEDURE — 3017F COLORECTAL CA SCREEN DOC REV: CPT | Performed by: INTERNAL MEDICINE

## 2025-01-20 PROCEDURE — 99211 OFF/OP EST MAY X REQ PHY/QHP: CPT | Performed by: INTERNAL MEDICINE

## 2025-01-20 PROCEDURE — G8417 CALC BMI ABV UP PARAM F/U: HCPCS | Performed by: INTERNAL MEDICINE

## 2025-01-20 NOTE — PROGRESS NOTES
abdomen and pelvis 03/29/2022. HISTORY: ORDERING SYSTEM PROVIDED HISTORY: Diffuse large B-cell lymphoma of intra-abdominal lymph nodes (HCC) TECHNOLOGIST PROVIDED HISTORY: STAT Creatinine as needed:->Yes survellance scan Reason for Exam: lymphoma f/u FINDINGS: Chest: Mediastinum: Visualized thyroid is normal.  Heart is mildly enlarged. Coronary artery calcifications.  Partially visualized right chest port catheter with tip in the right atrium.  No mediastinal or hilar adenopathy. No axillary adenopathy. Lungs/pleura: Calcified granuloma right upper lobe.  Mixed attenuation have the lung fields, likely related to incomplete inspiration.  No suspicious pulmonary nodule.  No pneumothorax or pleural effusion. Soft Tissues/Bones: Multilevel degenerative changes of the thoracic spine. Abdomen/Pelvis: Organs: Normal liver morphology.  Stable subcentimeter hypodense lesion within the left lateral segment, compatible with a cyst.  There is no intra or extrahepatic biliary dilatation.  Cholelithiasis without cholecystitis. Adrenal glands are normal.  Spleen is normal.  The pancreas is normal. The kidneys enhance normally bilaterally without focal suspicious mass.  No renal calculi or hydronephrosis.  The bladder is normal. GI/Bowel: Small hiatal hernia, otherwise the stomach is normal.  The small bowel and colon are nondilated.  The appendix is not seen. Pelvis: Normal uterus with calcified fibroids.  Coarse calcification within the right adnexa, unchanged from prior.  Stable appearance of circular metallic foreign body anterior to the urinary bladder. Peritoneum/Retroperitoneum: Stable appearance of periaortic soft tissue thickening extending from the celiac trunk to the distal aorta.  There is occlusion of the celiac artery origin, unchanged from prior..  The SMA appears patent.  The EVAN is patent.  Renal arteries appear patent bilaterally.  Stable 1.3 cm distal splenic artery aneurysm.  The portal vein, splenic vein and

## 2025-01-20 NOTE — TELEPHONE ENCOUNTER
JAY HERE FOR FOLLOW UP   Port removal per IR   Rv in 6 months with labs and scans 1 weeks before rv   LABS ORDERED; CBC CMP   PORT REMOVAL: WRITER CALLED IR & LEFT VM FOR THEM TO CALL PT TO SCHEDULE   CT: PT WILL CALL TO SCHEDULE   MD VISIT: 7/21/25 @ 9AM   LABS PRINTED AND GIVEN ON EXIT   AVS PRINTED AND GIVEN ON EXIT

## 2025-01-27 ENCOUNTER — HOSPITAL ENCOUNTER (OUTPATIENT)
Dept: INTERVENTIONAL RADIOLOGY/VASCULAR | Age: 75
Discharge: HOME | End: 2025-01-29
Payer: MEDICARE

## 2025-01-27 DIAGNOSIS — C83.33 DIFFUSE LARGE B-CELL LYMPHOMA OF INTRA-ABDOMINAL LYMPH NODES (HCC): ICD-10-CM

## 2025-01-27 PROCEDURE — 36590 REMOVAL TUNNELED CV CATH: CPT

## 2025-01-27 PROCEDURE — 77001 FLUOROGUIDE FOR VEIN DEVICE: CPT

## 2025-01-27 PROCEDURE — 2709999900 IR REMOVE TUNNELED CVAD W SQ PORT/PUMP INSERT

## 2025-01-27 NOTE — FLOWSHEET NOTE
Pt tolerated procedure without distress. Incision closed with absorbable sutures and skin glue. Discharge instructions reviewed understanding verbalized, pt released ambulatory in nad.

## 2025-01-27 NOTE — BRIEF OP NOTE
Brief Postoperative Note    Nallely Mcdermott  YOB: 1950  [id number]    Pre-operative Diagnosis: Port catheter removal    Post-operative Diagnosis: Same    Procedure: Right-sided Port Catheter Removal    Anesthesia: Local    Surgeons/Assistants: Dr. Rich and Mariama Santos PA-C    Estimated Blood Loss: Minimal    Complications: None    Specimens: Was Not Obtained    Findings: Successful removal of right-sided Port Catheter. Closure of incision with dissolvable sutures and skin adhesive glue. Patient tolerated procedure well.    Electronically signed by Mariama Santos PA-C on 1/27/2025 at 2:47 PM

## 2025-02-17 RX ORDER — ALENDRONATE SODIUM 70 MG/1
TABLET ORAL
Qty: 12 TABLET | Refills: 0 | OUTPATIENT
Start: 2025-02-17

## 2025-06-17 ENCOUNTER — TRANSCRIBE ORDERS (OUTPATIENT)
Dept: ADMINISTRATIVE | Age: 75
End: 2025-06-17

## 2025-06-17 ENCOUNTER — TELEPHONE (OUTPATIENT)
Dept: FAMILY MEDICINE CLINIC | Age: 75
End: 2025-06-17

## 2025-06-17 DIAGNOSIS — F17.211 CIGARETTE NICOTINE DEPENDENCE IN REMISSION: Primary | ICD-10-CM

## 2025-06-17 DIAGNOSIS — Z87.891 PERSONAL HISTORY OF TOBACCO USE: ICD-10-CM

## 2025-06-26 DIAGNOSIS — M16.10 ARTHRITIS OF HIP: ICD-10-CM

## 2025-06-26 RX ORDER — MELOXICAM 7.5 MG/1
7.5 TABLET ORAL 2 TIMES DAILY PRN
Qty: 180 TABLET | Refills: 0 | Status: SHIPPED | OUTPATIENT
Start: 2025-06-26

## 2025-06-26 NOTE — TELEPHONE ENCOUNTER
Please Approve or Refuse.  Send to Pharmacy per Pt's Request: [facility]      Next Visit Date:  called pt lvm    Last Visit Date: 9/13/2024    No results found for: \"LABA1C\"          ( goal A1C is < 7)   BP Readings from Last 3 Encounters:   01/20/25 (!) 151/75   09/13/24 130/82   07/08/24 (!) 148/85          (goal 120/80)  BUN   Date Value Ref Range Status   01/17/2025 19 8 - 23 mg/dL Final     Creatinine   Date Value Ref Range Status   01/17/2025 0.6 0.50 - 0.90 mg/dL Final     Potassium   Date Value Ref Range Status   01/17/2025 4.1 3.7 - 5.3 mmol/L Final

## (undated) DEVICE — CONTAINER,SPECIMEN,OR STERILE,4OZ: Brand: MEDLINE

## (undated) DEVICE — BONE TAMP KIT KPX153PB FF X2 15/3 1 STP: Brand: KYPHOPAK™ TRAY

## (undated) DEVICE — JCKSON TBL POSTNER NO HD REST: Brand: MEDLINE INDUSTRIES, INC.

## (undated) DEVICE — INTENDED FOR TISSUE SEPARATION, AND OTHER PROCEDURES THAT REQUIRE A SHARP SURGICAL BLADE TO PUNCTURE OR CUT.: Brand: BARD-PARKER ® CARBON RIB-BACK BLADES

## (undated) DEVICE — NEEDLE, QUINCKE, 18GX3.5": Brand: MEDLINE

## (undated) DEVICE — SYRINGE MED 30ML STD CLR PLAS LUERLOCK TIP N CTRL DISP

## (undated) DEVICE — BONE BIOPSY DEVICE F05A BBD SIZE 3: Brand: MEDTRONIC REUSABLE INSTRUMENTS

## (undated) DEVICE — MIXER A07A CEMENT

## (undated) DEVICE — SUTURE NONABSORBABLE MONOFILAMENT 3-0 PS-1 18 IN BLK ETHILON 1663H

## (undated) DEVICE — C-ARM: Brand: UNBRANDED

## (undated) DEVICE — 1010 S-DRAPE TOWEL DRAPE 10/BX: Brand: STERI-DRAPE™

## (undated) DEVICE — INSERT CUSHION HEAD PRONEVIEW

## (undated) DEVICE — STAZ MINOR LAPAROTOMY: Brand: MEDLINE INDUSTRIES, INC.

## (undated) DEVICE — DRAPE,MEDI-SLUSH,STERILE: Brand: MEDLINE

## (undated) DEVICE — GLOVE SURG SZ 8 THK118MIL BLK LTX FREE POLYISOPRENE BEAD CUF

## (undated) DEVICE — PAD,NON-ADHERENT,3X8,STERILE,LF,1/PK: Brand: MEDLINE